# Patient Record
Sex: FEMALE | Race: WHITE | NOT HISPANIC OR LATINO | Employment: UNEMPLOYED | URBAN - NONMETROPOLITAN AREA
[De-identification: names, ages, dates, MRNs, and addresses within clinical notes are randomized per-mention and may not be internally consistent; named-entity substitution may affect disease eponyms.]

---

## 2017-02-01 ENCOUNTER — TELEPHONE (OUTPATIENT)
Dept: CARDIOLOGY | Facility: CLINIC | Age: 72
End: 2017-02-01

## 2017-02-01 DIAGNOSIS — R53.83 FATIGUE, UNSPECIFIED TYPE: ICD-10-CM

## 2017-02-01 DIAGNOSIS — Z79.899 LONG TERM USE OF DRUG: ICD-10-CM

## 2017-02-01 DIAGNOSIS — R05.9 COUGH: Primary | ICD-10-CM

## 2017-02-02 ENCOUNTER — TELEPHONE (OUTPATIENT)
Dept: CARDIOLOGY | Facility: CLINIC | Age: 72
End: 2017-02-02

## 2017-02-02 ENCOUNTER — LAB (OUTPATIENT)
Dept: LAB | Facility: HOSPITAL | Age: 72
End: 2017-02-02
Attending: INTERNAL MEDICINE

## 2017-02-02 ENCOUNTER — HOSPITAL ENCOUNTER (OUTPATIENT)
Dept: GENERAL RADIOLOGY | Facility: HOSPITAL | Age: 72
Discharge: HOME | End: 2017-02-02
Attending: INTERNAL MEDICINE | Admitting: INTERNAL MEDICINE

## 2017-02-02 DIAGNOSIS — R05.9 COUGH: ICD-10-CM

## 2017-02-02 DIAGNOSIS — R53.83 FATIGUE, UNSPECIFIED TYPE: ICD-10-CM

## 2017-02-02 LAB
ALBUMIN SERPL-MCNC: 4.5 G/DL (ref 3.4–4.8)
ALBUMIN/GLOB SERPL: 1.6 G/DL (ref 1.5–2.5)
ALP SERPL-CCNC: 103 U/L (ref 46–116)
ALT SERPL W P-5'-P-CCNC: 20 U/L (ref 10–36)
ANION GAP SERPL CALCULATED.3IONS-SCNC: 7.2 MMOL/L (ref 3.6–11.2)
AST SERPL-CCNC: 28 U/L (ref 10–30)
BASOPHILS # BLD AUTO: 0.02 10*3/MM3 (ref 0–0.3)
BASOPHILS NFR BLD AUTO: 0.3 % (ref 0–2)
BILIRUB SERPL-MCNC: 1.1 MG/DL (ref 0.2–1.8)
BUN BLD-MCNC: 12 MG/DL (ref 7–21)
BUN/CREAT SERPL: 12.1 (ref 7–25)
CALCIUM SPEC-SCNC: 9.6 MG/DL (ref 7.7–10)
CHLORIDE SERPL-SCNC: 104 MMOL/L (ref 99–112)
CO2 SERPL-SCNC: 29.8 MMOL/L (ref 24.3–31.9)
CREAT BLD-MCNC: 0.99 MG/DL (ref 0.43–1.29)
DEPRECATED RDW RBC AUTO: 44.8 FL (ref 37–54)
EOSINOPHIL # BLD AUTO: 0.2 10*3/MM3 (ref 0–0.7)
EOSINOPHIL NFR BLD AUTO: 2.8 % (ref 0–7)
ERYTHROCYTE [DISTWIDTH] IN BLOOD BY AUTOMATED COUNT: 13.6 % (ref 11.5–14.5)
GFR SERPL CREATININE-BSD FRML MDRD: 55 ML/MIN/1.73
GLOBULIN UR ELPH-MCNC: 2.8 GM/DL
GLUCOSE BLD-MCNC: 124 MG/DL (ref 70–110)
HCT VFR BLD AUTO: 39.2 % (ref 37–47)
HGB BLD-MCNC: 13.1 G/DL (ref 12–16)
IMM GRANULOCYTES # BLD: 0.02 10*3/MM3 (ref 0–0.03)
IMM GRANULOCYTES NFR BLD: 0.3 % (ref 0–0.5)
LYMPHOCYTES # BLD AUTO: 1.72 10*3/MM3 (ref 1–3)
LYMPHOCYTES NFR BLD AUTO: 23.9 % (ref 16–46)
MCH RBC QN AUTO: 30.5 PG (ref 27–33)
MCHC RBC AUTO-ENTMCNC: 33.4 G/DL (ref 33–37)
MCV RBC AUTO: 91.2 FL (ref 80–94)
MONOCYTES # BLD AUTO: 0.43 10*3/MM3 (ref 0.1–0.9)
MONOCYTES NFR BLD AUTO: 6 % (ref 0–12)
NEUTROPHILS # BLD AUTO: 4.82 10*3/MM3 (ref 1.4–6.5)
NEUTROPHILS NFR BLD AUTO: 66.7 % (ref 40–75)
OSMOLALITY SERPL CALC.SUM OF ELEC: 282.4 MOSM/KG (ref 273–305)
PLATELET # BLD AUTO: 268 10*3/MM3 (ref 130–400)
PMV BLD AUTO: 9.5 FL (ref 6–10)
POTASSIUM BLD-SCNC: 4.2 MMOL/L (ref 3.5–5.3)
PROT SERPL-MCNC: 7.3 G/DL (ref 6–8)
RBC # BLD AUTO: 4.3 10*6/MM3 (ref 4.2–5.4)
SODIUM BLD-SCNC: 141 MMOL/L (ref 135–153)
WBC NRBC COR # BLD: 7.21 10*3/MM3 (ref 4.5–12.5)

## 2017-02-02 PROCEDURE — 85025 COMPLETE CBC W/AUTO DIFF WBC: CPT | Performed by: INTERNAL MEDICINE

## 2017-02-02 PROCEDURE — 80053 COMPREHEN METABOLIC PANEL: CPT | Performed by: INTERNAL MEDICINE

## 2017-02-02 PROCEDURE — 71020 HC CHEST PA AND LATERAL: CPT

## 2017-02-02 PROCEDURE — 36415 COLL VENOUS BLD VENIPUNCTURE: CPT

## 2017-02-02 PROCEDURE — 71020 XR CHEST 2 VW: CPT | Performed by: RADIOLOGY

## 2017-08-31 ENCOUNTER — TRANSCRIBE ORDERS (OUTPATIENT)
Dept: ADMINISTRATIVE | Facility: HOSPITAL | Age: 72
End: 2017-08-31

## 2017-08-31 ENCOUNTER — LAB (OUTPATIENT)
Dept: LAB | Facility: HOSPITAL | Age: 72
End: 2017-08-31

## 2017-08-31 DIAGNOSIS — E78.5 HYPERLIPIDEMIA, UNSPECIFIED HYPERLIPIDEMIA TYPE: Primary | ICD-10-CM

## 2017-08-31 DIAGNOSIS — E03.9 UNSPECIFIED HYPOTHYROIDISM: ICD-10-CM

## 2017-08-31 DIAGNOSIS — E11.9 DIABETES MELLITUS WITHOUT COMPLICATION (HCC): ICD-10-CM

## 2017-08-31 DIAGNOSIS — R53.82 CHRONIC FATIGUE: ICD-10-CM

## 2017-08-31 DIAGNOSIS — E78.5 HYPERLIPIDEMIA, UNSPECIFIED HYPERLIPIDEMIA TYPE: ICD-10-CM

## 2017-08-31 DIAGNOSIS — E55.9 VITAMIN D DEFICIENCY: ICD-10-CM

## 2017-08-31 LAB
25(OH)D3 SERPL-MCNC: 36 NG/ML
ALBUMIN SERPL-MCNC: 4.8 G/DL (ref 3.4–4.8)
ALBUMIN/GLOB SERPL: 1.7 G/DL (ref 1.5–2.5)
ALP SERPL-CCNC: 100 U/L (ref 35–104)
ALT SERPL W P-5'-P-CCNC: 19 U/L (ref 10–36)
ANION GAP SERPL CALCULATED.3IONS-SCNC: 6.9 MMOL/L (ref 3.6–11.2)
AST SERPL-CCNC: 26 U/L (ref 10–30)
BASOPHILS # BLD AUTO: 0.02 10*3/MM3 (ref 0–0.3)
BASOPHILS NFR BLD AUTO: 0.3 % (ref 0–2)
BILIRUB SERPL-MCNC: 1 MG/DL (ref 0.2–1.8)
BUN BLD-MCNC: 17 MG/DL (ref 7–21)
BUN/CREAT SERPL: 15.9 (ref 7–25)
CALCIUM SPEC-SCNC: 9.8 MG/DL (ref 7.7–10)
CHLORIDE SERPL-SCNC: 99 MMOL/L (ref 99–112)
CHOLEST SERPL-MCNC: 192 MG/DL (ref 0–200)
CO2 SERPL-SCNC: 31.1 MMOL/L (ref 24.3–31.9)
CREAT BLD-MCNC: 1.07 MG/DL (ref 0.43–1.29)
DEPRECATED RDW RBC AUTO: 42 FL (ref 37–54)
EOSINOPHIL # BLD AUTO: 0.21 10*3/MM3 (ref 0–0.7)
EOSINOPHIL NFR BLD AUTO: 2.8 % (ref 0–7)
ERYTHROCYTE [DISTWIDTH] IN BLOOD BY AUTOMATED COUNT: 12.8 % (ref 11.5–14.5)
GFR SERPL CREATININE-BSD FRML MDRD: 51 ML/MIN/1.73
GLOBULIN UR ELPH-MCNC: 2.8 GM/DL
GLUCOSE BLD-MCNC: 114 MG/DL (ref 70–110)
HBA1C MFR BLD: 5.5 % (ref 4.5–5.7)
HCT VFR BLD AUTO: 39.6 % (ref 37–47)
HDLC SERPL-MCNC: 74 MG/DL (ref 60–100)
HGB BLD-MCNC: 13.1 G/DL (ref 12–16)
IMM GRANULOCYTES # BLD: 0.01 10*3/MM3 (ref 0–0.03)
IMM GRANULOCYTES NFR BLD: 0.1 % (ref 0–0.5)
LDLC SERPL CALC-MCNC: 99 MG/DL (ref 0–100)
LDLC/HDLC SERPL: 1.33 {RATIO}
LYMPHOCYTES # BLD AUTO: 1.97 10*3/MM3 (ref 1–3)
LYMPHOCYTES NFR BLD AUTO: 26.5 % (ref 16–46)
MCH RBC QN AUTO: 30.2 PG (ref 27–33)
MCHC RBC AUTO-ENTMCNC: 33.1 G/DL (ref 33–37)
MCV RBC AUTO: 91.2 FL (ref 80–94)
MONOCYTES # BLD AUTO: 0.42 10*3/MM3 (ref 0.1–0.9)
MONOCYTES NFR BLD AUTO: 5.6 % (ref 0–12)
NEUTROPHILS # BLD AUTO: 4.81 10*3/MM3 (ref 1.4–6.5)
NEUTROPHILS NFR BLD AUTO: 64.7 % (ref 40–75)
OSMOLALITY SERPL CALC.SUM OF ELEC: 276.2 MOSM/KG (ref 273–305)
PLATELET # BLD AUTO: 308 10*3/MM3 (ref 130–400)
PMV BLD AUTO: 9.2 FL (ref 6–10)
POTASSIUM BLD-SCNC: 4.2 MMOL/L (ref 3.5–5.3)
PROT SERPL-MCNC: 7.6 G/DL (ref 6–8)
RBC # BLD AUTO: 4.34 10*6/MM3 (ref 4.2–5.4)
SODIUM BLD-SCNC: 137 MMOL/L (ref 135–153)
TRIGL SERPL-MCNC: 97 MG/DL (ref 0–150)
TSH SERPL DL<=0.05 MIU/L-ACNC: 2.56 MIU/ML (ref 0.55–4.78)
VIT B12 BLD-MCNC: 564 PG/ML (ref 211–911)
VLDLC SERPL-MCNC: 19.4 MG/DL
WBC NRBC COR # BLD: 7.44 10*3/MM3 (ref 4.5–12.5)

## 2017-08-31 PROCEDURE — 83036 HEMOGLOBIN GLYCOSYLATED A1C: CPT | Performed by: FAMILY MEDICINE

## 2017-08-31 PROCEDURE — 84443 ASSAY THYROID STIM HORMONE: CPT | Performed by: FAMILY MEDICINE

## 2017-08-31 PROCEDURE — 80053 COMPREHEN METABOLIC PANEL: CPT | Performed by: FAMILY MEDICINE

## 2017-08-31 PROCEDURE — 82607 VITAMIN B-12: CPT | Performed by: FAMILY MEDICINE

## 2017-08-31 PROCEDURE — 85025 COMPLETE CBC W/AUTO DIFF WBC: CPT | Performed by: FAMILY MEDICINE

## 2017-08-31 PROCEDURE — 82306 VITAMIN D 25 HYDROXY: CPT | Performed by: FAMILY MEDICINE

## 2017-08-31 PROCEDURE — 36415 COLL VENOUS BLD VENIPUNCTURE: CPT

## 2017-08-31 PROCEDURE — 80061 LIPID PANEL: CPT | Performed by: FAMILY MEDICINE

## 2017-10-26 ENCOUNTER — TRANSCRIBE ORDERS (OUTPATIENT)
Dept: ADMINISTRATIVE | Facility: HOSPITAL | Age: 72
End: 2017-10-26

## 2017-10-26 DIAGNOSIS — M25.562 LEFT KNEE PAIN, UNSPECIFIED CHRONICITY: ICD-10-CM

## 2017-10-26 DIAGNOSIS — R07.89 OTHER CHEST PAIN: ICD-10-CM

## 2017-10-26 DIAGNOSIS — R13.10 DYSPHAGIA, UNSPECIFIED TYPE: Primary | ICD-10-CM

## 2017-10-27 ENCOUNTER — HOSPITAL ENCOUNTER (OUTPATIENT)
Dept: GENERAL RADIOLOGY | Facility: HOSPITAL | Age: 72
Discharge: HOME | End: 2017-10-27

## 2017-10-27 ENCOUNTER — HOSPITAL ENCOUNTER (OUTPATIENT)
Dept: GENERAL RADIOLOGY | Facility: HOSPITAL | Age: 72
Discharge: HOME | End: 2017-10-27
Admitting: PHYSICIAN ASSISTANT

## 2017-10-27 ENCOUNTER — HOSPITAL ENCOUNTER (OUTPATIENT)
Dept: GENERAL RADIOLOGY | Facility: HOSPITAL | Age: 72
Discharge: HOME | End: 2017-10-27
Admitting: FAMILY MEDICINE

## 2017-10-27 DIAGNOSIS — M25.562 LEFT KNEE PAIN, UNSPECIFIED CHRONICITY: ICD-10-CM

## 2017-10-27 DIAGNOSIS — R13.10 DYSPHAGIA, UNSPECIFIED TYPE: ICD-10-CM

## 2017-10-27 DIAGNOSIS — R07.89 OTHER CHEST PAIN: ICD-10-CM

## 2017-10-27 PROCEDURE — 73564 X-RAY EXAM KNEE 4 OR MORE: CPT | Performed by: RADIOLOGY

## 2017-10-27 PROCEDURE — 74241 FL UPPER GI SINGLE CONTRAST W KUB: CPT | Performed by: RADIOLOGY

## 2017-10-27 PROCEDURE — 73564 X-RAY EXAM KNEE 4 OR MORE: CPT

## 2017-10-27 PROCEDURE — 71020 HC CHEST PA AND LATERAL: CPT

## 2017-10-27 PROCEDURE — 71020 XR CHEST PA AND LATERAL: CPT | Performed by: RADIOLOGY

## 2017-10-27 RX ADMIN — BARIUM SULFATE 140 ML: 980 POWDER, FOR SUSPENSION ORAL at 09:55

## 2017-11-15 ENCOUNTER — OFFICE VISIT (OUTPATIENT)
Dept: SURGERY | Facility: CLINIC | Age: 72
End: 2017-11-15

## 2017-11-15 ENCOUNTER — CONSULT (OUTPATIENT)
Dept: CARDIOLOGY | Facility: CLINIC | Age: 72
End: 2017-11-15

## 2017-11-15 VITALS
HEART RATE: 73 BPM | WEIGHT: 166.8 LBS | BODY MASS INDEX: 29.55 KG/M2 | SYSTOLIC BLOOD PRESSURE: 118 MMHG | OXYGEN SATURATION: 97 % | DIASTOLIC BLOOD PRESSURE: 68 MMHG | HEIGHT: 63 IN

## 2017-11-15 VITALS — BODY MASS INDEX: 29.41 KG/M2 | HEIGHT: 63 IN | WEIGHT: 166 LBS

## 2017-11-15 DIAGNOSIS — K44.9 PARAESOPHAGEAL HERNIA: Primary | ICD-10-CM

## 2017-11-15 DIAGNOSIS — I48.0 PAF (PAROXYSMAL ATRIAL FIBRILLATION) (HCC): Primary | ICD-10-CM

## 2017-11-15 DIAGNOSIS — M25.473 ANKLE EDEMA: ICD-10-CM

## 2017-11-15 PROCEDURE — 99213 OFFICE O/P EST LOW 20 MIN: CPT | Performed by: INTERNAL MEDICINE

## 2017-11-15 PROCEDURE — 99204 OFFICE O/P NEW MOD 45 MIN: CPT | Performed by: SURGERY

## 2017-11-15 RX ORDER — PANTOPRAZOLE SODIUM 40 MG/1
40 TABLET, DELAYED RELEASE ORAL DAILY
COMMUNITY
End: 2017-11-28 | Stop reason: SDUPTHER

## 2017-11-15 RX ORDER — FLUCONAZOLE 100 MG/1
100 TABLET ORAL DAILY
Status: ON HOLD | COMMUNITY
End: 2017-12-07

## 2017-11-15 RX ORDER — RANITIDINE 150 MG/1
150 TABLET ORAL 2 TIMES DAILY
COMMUNITY
End: 2017-12-06

## 2017-11-27 ENCOUNTER — HOSPITAL ENCOUNTER (OUTPATIENT)
Dept: CT IMAGING | Facility: HOSPITAL | Age: 72
Discharge: HOME | End: 2017-11-27
Attending: SURGERY | Admitting: SURGERY

## 2017-11-27 ENCOUNTER — HOSPITAL ENCOUNTER (OUTPATIENT)
Dept: CT IMAGING | Facility: HOSPITAL | Age: 72
Discharge: HOME | End: 2017-11-27
Attending: SURGERY

## 2017-11-27 DIAGNOSIS — K44.9 PARAESOPHAGEAL HERNIA: ICD-10-CM

## 2017-11-27 PROCEDURE — 74177 CT ABD & PELVIS W/CONTRAST: CPT | Performed by: RADIOLOGY

## 2017-11-27 PROCEDURE — 0 IOPAMIDOL 61 % SOLUTION: Performed by: SURGERY

## 2017-11-27 PROCEDURE — 82565 ASSAY OF CREATININE: CPT

## 2017-11-27 PROCEDURE — 71260 CT THORAX DX C+: CPT | Performed by: RADIOLOGY

## 2017-11-27 PROCEDURE — 74177 CT ABD & PELVIS W/CONTRAST: CPT

## 2017-11-27 PROCEDURE — 71260 CT THORAX DX C+: CPT

## 2017-11-27 RX ADMIN — IOPAMIDOL 100 ML: 612 INJECTION, SOLUTION INTRAVENOUS at 11:30

## 2017-11-27 RX ADMIN — IOPAMIDOL 100 ML: 612 INJECTION, SOLUTION INTRAVENOUS at 11:45

## 2017-11-28 RX ORDER — PANTOPRAZOLE SODIUM 40 MG/1
40 TABLET, DELAYED RELEASE ORAL DAILY
Qty: 30 TABLET | Refills: 0 | Status: SHIPPED | OUTPATIENT
Start: 2017-11-28 | End: 2018-07-18 | Stop reason: ALTCHOICE

## 2017-12-06 LAB — CREAT BLDA-MCNC: 1.2 MG/DL (ref 0.6–1.3)

## 2017-12-06 RX ORDER — PROMETHAZINE HYDROCHLORIDE 12.5 MG/1
12.5 SUPPOSITORY RECTAL EVERY 6 HOURS PRN
Status: ON HOLD | COMMUNITY
End: 2018-02-21

## 2017-12-07 ENCOUNTER — HOSPITAL ENCOUNTER (OUTPATIENT)
Facility: HOSPITAL | Age: 72
Setting detail: HOSPITAL OUTPATIENT SURGERY
Discharge: HOME | End: 2017-12-07
Attending: SURGERY | Admitting: SURGERY

## 2017-12-07 ENCOUNTER — ANESTHESIA (OUTPATIENT)
Dept: PERIOP | Facility: HOSPITAL | Age: 72
End: 2017-12-07

## 2017-12-07 ENCOUNTER — ANESTHESIA EVENT (OUTPATIENT)
Dept: PERIOP | Facility: HOSPITAL | Age: 72
End: 2017-12-07

## 2017-12-07 VITALS
BODY MASS INDEX: 30.36 KG/M2 | HEIGHT: 62 IN | RESPIRATION RATE: 18 BRPM | DIASTOLIC BLOOD PRESSURE: 78 MMHG | WEIGHT: 165 LBS | OXYGEN SATURATION: 97 % | HEART RATE: 83 BPM | TEMPERATURE: 98.3 F | SYSTOLIC BLOOD PRESSURE: 127 MMHG

## 2017-12-07 DIAGNOSIS — K44.9 PARAESOPHAGEAL HERNIA: ICD-10-CM

## 2017-12-07 PROCEDURE — 43239 EGD BIOPSY SINGLE/MULTIPLE: CPT | Performed by: SURGERY

## 2017-12-07 PROCEDURE — 88305 TISSUE EXAM BY PATHOLOGIST: CPT | Performed by: SURGERY

## 2017-12-07 PROCEDURE — 25010000002 PROPOFOL 10 MG/ML EMULSION: Performed by: NURSE ANESTHETIST, CERTIFIED REGISTERED

## 2017-12-07 PROCEDURE — 25010000002 PROPOFOL 1000 MG/ML EMULSION: Performed by: NURSE ANESTHETIST, CERTIFIED REGISTERED

## 2017-12-07 RX ORDER — FENTANYL CITRATE 50 UG/ML
50 INJECTION, SOLUTION INTRAMUSCULAR; INTRAVENOUS
Status: DISCONTINUED | OUTPATIENT
Start: 2017-12-07 | End: 2017-12-07 | Stop reason: HOSPADM

## 2017-12-07 RX ORDER — PROPOFOL 10 MG/ML
VIAL (ML) INTRAVENOUS AS NEEDED
Status: DISCONTINUED | OUTPATIENT
Start: 2017-12-07 | End: 2017-12-07 | Stop reason: SURG

## 2017-12-07 RX ORDER — SODIUM CHLORIDE, SODIUM LACTATE, POTASSIUM CHLORIDE, CALCIUM CHLORIDE 600; 310; 30; 20 MG/100ML; MG/100ML; MG/100ML; MG/100ML
125 INJECTION, SOLUTION INTRAVENOUS CONTINUOUS
Status: DISCONTINUED | OUTPATIENT
Start: 2017-12-07 | End: 2017-12-07 | Stop reason: HOSPADM

## 2017-12-07 RX ORDER — SODIUM CHLORIDE 0.9 % (FLUSH) 0.9 %
1-10 SYRINGE (ML) INJECTION AS NEEDED
Status: DISCONTINUED | OUTPATIENT
Start: 2017-12-07 | End: 2017-12-07 | Stop reason: HOSPADM

## 2017-12-07 RX ORDER — MIDAZOLAM HYDROCHLORIDE 1 MG/ML
1 INJECTION INTRAMUSCULAR; INTRAVENOUS
Status: DISCONTINUED | OUTPATIENT
Start: 2017-12-07 | End: 2017-12-07 | Stop reason: HOSPADM

## 2017-12-07 RX ORDER — IPRATROPIUM BROMIDE AND ALBUTEROL SULFATE 2.5; .5 MG/3ML; MG/3ML
3 SOLUTION RESPIRATORY (INHALATION) ONCE AS NEEDED
Status: DISCONTINUED | OUTPATIENT
Start: 2017-12-07 | End: 2017-12-07 | Stop reason: HOSPADM

## 2017-12-07 RX ORDER — ONDANSETRON 2 MG/ML
4 INJECTION INTRAMUSCULAR; INTRAVENOUS ONCE AS NEEDED
Status: DISCONTINUED | OUTPATIENT
Start: 2017-12-07 | End: 2017-12-07 | Stop reason: HOSPADM

## 2017-12-07 RX ORDER — MIDAZOLAM HYDROCHLORIDE 1 MG/ML
2 INJECTION INTRAMUSCULAR; INTRAVENOUS
Status: DISCONTINUED | OUTPATIENT
Start: 2017-12-07 | End: 2017-12-07 | Stop reason: HOSPADM

## 2017-12-07 RX ORDER — MEPERIDINE HYDROCHLORIDE 25 MG/ML
12.5 INJECTION INTRAMUSCULAR; INTRAVENOUS; SUBCUTANEOUS
Status: DISCONTINUED | OUTPATIENT
Start: 2017-12-07 | End: 2017-12-07 | Stop reason: HOSPADM

## 2017-12-07 RX ADMIN — PROPOFOL 30 MG: 10 INJECTION, EMULSION INTRAVENOUS at 07:41

## 2017-12-07 RX ADMIN — PROPOFOL 140 MCG/KG/MIN: 10 INJECTION, EMULSION INTRAVENOUS at 07:41

## 2017-12-11 LAB
LAB AP CASE REPORT: NORMAL
Lab: NORMAL
PATH REPORT.FINAL DX SPEC: NORMAL

## 2017-12-27 ENCOUNTER — OFFICE VISIT (OUTPATIENT)
Dept: SURGERY | Facility: CLINIC | Age: 72
End: 2017-12-27

## 2017-12-27 VITALS — WEIGHT: 164 LBS | HEIGHT: 62 IN | BODY MASS INDEX: 30.18 KG/M2

## 2017-12-27 DIAGNOSIS — K44.9 PARAESOPHAGEAL HERNIA: Primary | ICD-10-CM

## 2017-12-27 PROCEDURE — 99213 OFFICE O/P EST LOW 20 MIN: CPT | Performed by: SURGERY

## 2018-01-02 ENCOUNTER — HOSPITAL ENCOUNTER (OUTPATIENT)
Dept: PREOP | Facility: HOSPITAL | Age: 73
Setting detail: HOSPITAL OUTPATIENT SURGERY
Discharge: HOME | End: 2018-01-02
Attending: SURGERY | Admitting: SURGERY

## 2018-01-02 VITALS
OXYGEN SATURATION: 98 % | DIASTOLIC BLOOD PRESSURE: 77 MMHG | BODY MASS INDEX: 30.18 KG/M2 | WEIGHT: 164 LBS | TEMPERATURE: 98.2 F | RESPIRATION RATE: 20 BRPM | SYSTOLIC BLOOD PRESSURE: 133 MMHG | HEART RATE: 69 BPM | HEIGHT: 62 IN

## 2018-01-02 DIAGNOSIS — K44.9 PARAESOPHAGEAL HERNIA: ICD-10-CM

## 2018-01-02 PROCEDURE — 91010 ESOPHAGUS MOTILITY STUDY: CPT

## 2018-01-02 RX ADMIN — LIDOCAINE HYDROCHLORIDE 3 ML: 20 SOLUTION ORAL; TOPICAL at 13:29

## 2018-01-10 ENCOUNTER — OFFICE VISIT (OUTPATIENT)
Dept: SURGERY | Facility: CLINIC | Age: 73
End: 2018-01-10

## 2018-01-10 VITALS — HEIGHT: 62 IN | WEIGHT: 164 LBS | BODY MASS INDEX: 30.18 KG/M2

## 2018-01-10 DIAGNOSIS — K44.9 PARAESOPHAGEAL HERNIA: Primary | ICD-10-CM

## 2018-01-10 PROCEDURE — 99213 OFFICE O/P EST LOW 20 MIN: CPT | Performed by: SURGERY

## 2018-01-10 RX ORDER — DOCUSATE SODIUM 250 MG
250 CAPSULE ORAL 2 TIMES DAILY PRN
Qty: 60 CAPSULE | Refills: 2 | Status: SHIPPED | OUTPATIENT
Start: 2018-01-10 | End: 2018-07-18 | Stop reason: ALTCHOICE

## 2018-02-12 ENCOUNTER — CONSULT (OUTPATIENT)
Dept: GASTROENTEROLOGY | Facility: CLINIC | Age: 73
End: 2018-02-12

## 2018-02-12 VITALS
SYSTOLIC BLOOD PRESSURE: 138 MMHG | HEIGHT: 62 IN | BODY MASS INDEX: 29.59 KG/M2 | DIASTOLIC BLOOD PRESSURE: 78 MMHG | WEIGHT: 160.8 LBS | OXYGEN SATURATION: 99 % | HEART RATE: 81 BPM

## 2018-02-12 DIAGNOSIS — K44.9 PARAESOPHAGEAL HERNIA: ICD-10-CM

## 2018-02-12 DIAGNOSIS — R13.10 DYSPHAGIA, UNSPECIFIED TYPE: Primary | ICD-10-CM

## 2018-02-12 DIAGNOSIS — K21.9 GASTROESOPHAGEAL REFLUX DISEASE, ESOPHAGITIS PRESENCE NOT SPECIFIED: ICD-10-CM

## 2018-02-12 PROCEDURE — 99214 OFFICE O/P EST MOD 30 MIN: CPT | Performed by: PHYSICIAN ASSISTANT

## 2018-02-13 PROBLEM — R13.10 DYSPHAGIA: Status: ACTIVE | Noted: 2018-02-13

## 2018-02-13 PROBLEM — K21.9 GASTROESOPHAGEAL REFLUX DISEASE: Status: ACTIVE | Noted: 2018-02-13

## 2018-02-20 ENCOUNTER — OFFICE VISIT (OUTPATIENT)
Dept: SURGERY | Facility: CLINIC | Age: 73
End: 2018-02-20

## 2018-02-20 VITALS — BODY MASS INDEX: 29.44 KG/M2 | HEIGHT: 62 IN | WEIGHT: 160 LBS

## 2018-02-20 DIAGNOSIS — K44.9 PARAESOPHAGEAL HERNIA: ICD-10-CM

## 2018-02-20 DIAGNOSIS — K21.9 GASTROESOPHAGEAL REFLUX DISEASE WITHOUT ESOPHAGITIS: Primary | ICD-10-CM

## 2018-02-20 PROCEDURE — 99213 OFFICE O/P EST LOW 20 MIN: CPT | Performed by: SURGERY

## 2018-02-20 RX ORDER — RANITIDINE 150 MG/1
150 TABLET ORAL 2 TIMES DAILY
COMMUNITY
End: 2018-07-18 | Stop reason: ALTCHOICE

## 2018-02-21 ENCOUNTER — ANESTHESIA EVENT (OUTPATIENT)
Dept: PERIOP | Facility: HOSPITAL | Age: 73
End: 2018-02-21

## 2018-02-21 ENCOUNTER — HOSPITAL ENCOUNTER (OUTPATIENT)
Facility: HOSPITAL | Age: 73
Setting detail: HOSPITAL OUTPATIENT SURGERY
Discharge: HOME | End: 2018-02-21
Attending: INTERNAL MEDICINE | Admitting: INTERNAL MEDICINE

## 2018-02-21 ENCOUNTER — ANESTHESIA (OUTPATIENT)
Dept: PERIOP | Facility: HOSPITAL | Age: 73
End: 2018-02-21

## 2018-02-21 VITALS
WEIGHT: 160 LBS | DIASTOLIC BLOOD PRESSURE: 53 MMHG | HEART RATE: 66 BPM | HEIGHT: 62 IN | BODY MASS INDEX: 29.44 KG/M2 | SYSTOLIC BLOOD PRESSURE: 106 MMHG | TEMPERATURE: 97.6 F | RESPIRATION RATE: 20 BRPM | OXYGEN SATURATION: 100 %

## 2018-02-21 DIAGNOSIS — R13.10 DYSPHAGIA, UNSPECIFIED TYPE: ICD-10-CM

## 2018-02-21 DIAGNOSIS — K44.9 PARAESOPHAGEAL HERNIA: ICD-10-CM

## 2018-02-21 DIAGNOSIS — K21.9 GASTROESOPHAGEAL REFLUX DISEASE, ESOPHAGITIS PRESENCE NOT SPECIFIED: ICD-10-CM

## 2018-02-21 PROCEDURE — 43239 EGD BIOPSY SINGLE/MULTIPLE: CPT | Performed by: INTERNAL MEDICINE

## 2018-02-21 PROCEDURE — 88305 TISSUE EXAM BY PATHOLOGIST: CPT | Performed by: INTERNAL MEDICINE

## 2018-02-21 PROCEDURE — 88342 IMHCHEM/IMCYTCHM 1ST ANTB: CPT | Performed by: INTERNAL MEDICINE

## 2018-02-21 PROCEDURE — 43248 EGD GUIDE WIRE INSERTION: CPT | Performed by: INTERNAL MEDICINE

## 2018-02-21 PROCEDURE — 25010000002 PROPOFOL 10 MG/ML EMULSION: Performed by: NURSE ANESTHETIST, CERTIFIED REGISTERED

## 2018-02-21 RX ORDER — SODIUM CHLORIDE, SODIUM LACTATE, POTASSIUM CHLORIDE, CALCIUM CHLORIDE 600; 310; 30; 20 MG/100ML; MG/100ML; MG/100ML; MG/100ML
125 INJECTION, SOLUTION INTRAVENOUS CONTINUOUS
Status: DISCONTINUED | OUTPATIENT
Start: 2018-02-21 | End: 2018-02-21 | Stop reason: HOSPADM

## 2018-02-21 RX ORDER — VITAMIN E 268 MG
400 CAPSULE ORAL DAILY
COMMUNITY

## 2018-02-21 RX ORDER — LIDOCAINE HYDROCHLORIDE 20 MG/ML
INJECTION, SOLUTION INFILTRATION; PERINEURAL AS NEEDED
Status: DISCONTINUED | OUTPATIENT
Start: 2018-02-21 | End: 2018-02-21 | Stop reason: SURG

## 2018-02-21 RX ORDER — FENTANYL CITRATE 50 UG/ML
50 INJECTION, SOLUTION INTRAMUSCULAR; INTRAVENOUS
Status: DISCONTINUED | OUTPATIENT
Start: 2018-02-21 | End: 2018-02-21 | Stop reason: HOSPADM

## 2018-02-21 RX ORDER — ONDANSETRON 2 MG/ML
4 INJECTION INTRAMUSCULAR; INTRAVENOUS ONCE AS NEEDED
Status: DISCONTINUED | OUTPATIENT
Start: 2018-02-21 | End: 2018-02-21 | Stop reason: HOSPADM

## 2018-02-21 RX ORDER — IPRATROPIUM BROMIDE AND ALBUTEROL SULFATE 2.5; .5 MG/3ML; MG/3ML
3 SOLUTION RESPIRATORY (INHALATION) ONCE AS NEEDED
Status: DISCONTINUED | OUTPATIENT
Start: 2018-02-21 | End: 2018-02-21 | Stop reason: HOSPADM

## 2018-02-21 RX ORDER — SODIUM CHLORIDE 0.9 % (FLUSH) 0.9 %
1-10 SYRINGE (ML) INJECTION AS NEEDED
Status: DISCONTINUED | OUTPATIENT
Start: 2018-02-21 | End: 2018-02-21 | Stop reason: HOSPADM

## 2018-02-21 RX ORDER — PROPOFOL 10 MG/ML
VIAL (ML) INTRAVENOUS AS NEEDED
Status: DISCONTINUED | OUTPATIENT
Start: 2018-02-21 | End: 2018-02-21 | Stop reason: SURG

## 2018-02-21 RX ADMIN — LIDOCAINE HYDROCHLORIDE 80 MG: 20 INJECTION, SOLUTION INFILTRATION; PERINEURAL at 10:43

## 2018-02-21 RX ADMIN — PROPOFOL 100 MG: 10 INJECTION, EMULSION INTRAVENOUS at 10:43

## 2018-02-21 RX ADMIN — SODIUM CHLORIDE, POTASSIUM CHLORIDE, SODIUM LACTATE AND CALCIUM CHLORIDE 125 ML/HR: 600; 310; 30; 20 INJECTION, SOLUTION INTRAVENOUS at 09:44

## 2018-02-23 LAB
LAB AP CASE REPORT: NORMAL
Lab: NORMAL
PATH REPORT.FINAL DX SPEC: NORMAL

## 2018-03-01 ENCOUNTER — OFFICE VISIT (OUTPATIENT)
Dept: CARDIOLOGY | Facility: CLINIC | Age: 73
End: 2018-03-01

## 2018-03-01 VITALS
WEIGHT: 158.4 LBS | BODY MASS INDEX: 29.15 KG/M2 | HEART RATE: 70 BPM | SYSTOLIC BLOOD PRESSURE: 124 MMHG | OXYGEN SATURATION: 97 % | HEIGHT: 62 IN | DIASTOLIC BLOOD PRESSURE: 78 MMHG

## 2018-03-01 DIAGNOSIS — R94.31 ABNORMAL EKG: ICD-10-CM

## 2018-03-01 DIAGNOSIS — I48.0 PAF (PAROXYSMAL ATRIAL FIBRILLATION) (HCC): Primary | ICD-10-CM

## 2018-03-01 PROBLEM — R07.9 CHEST PAIN IN ADULT: Status: ACTIVE | Noted: 2018-03-01

## 2018-03-01 PROCEDURE — 99213 OFFICE O/P EST LOW 20 MIN: CPT | Performed by: INTERNAL MEDICINE

## 2018-03-01 PROCEDURE — 93000 ELECTROCARDIOGRAM COMPLETE: CPT | Performed by: INTERNAL MEDICINE

## 2018-03-01 RX ORDER — MELATONIN
5000 DAILY
COMMUNITY

## 2018-03-01 RX ORDER — FUROSEMIDE 40 MG/1
TABLET ORAL
COMMUNITY
Start: 2018-01-22

## 2018-07-18 ENCOUNTER — OFFICE VISIT (OUTPATIENT)
Dept: CARDIOLOGY | Facility: CLINIC | Age: 73
End: 2018-07-18

## 2018-07-18 VITALS
HEIGHT: 62 IN | HEART RATE: 83 BPM | SYSTOLIC BLOOD PRESSURE: 128 MMHG | BODY MASS INDEX: 28.34 KG/M2 | DIASTOLIC BLOOD PRESSURE: 70 MMHG | OXYGEN SATURATION: 98 % | WEIGHT: 154 LBS

## 2018-07-18 DIAGNOSIS — I48.0 PAF (PAROXYSMAL ATRIAL FIBRILLATION) (HCC): ICD-10-CM

## 2018-07-18 DIAGNOSIS — R94.31 ABNORMAL EKG: ICD-10-CM

## 2018-07-18 DIAGNOSIS — I48.91 ATRIAL FIBRILLATION WITH RVR (HCC): Primary | ICD-10-CM

## 2018-07-18 PROCEDURE — 93000 ELECTROCARDIOGRAM COMPLETE: CPT | Performed by: INTERNAL MEDICINE

## 2018-07-18 PROCEDURE — 99214 OFFICE O/P EST MOD 30 MIN: CPT | Performed by: INTERNAL MEDICINE

## 2018-07-18 RX ORDER — METOPROLOL SUCCINATE 25 MG/1
25 TABLET, EXTENDED RELEASE ORAL DAILY
Qty: 90 TABLET | Refills: 3 | Status: SHIPPED | OUTPATIENT
Start: 2018-07-18 | End: 2019-07-17 | Stop reason: SDUPTHER

## 2018-07-26 ENCOUNTER — OFFICE VISIT (OUTPATIENT)
Dept: CARDIOLOGY | Facility: CLINIC | Age: 73
End: 2018-07-26

## 2018-07-26 VITALS
HEART RATE: 75 BPM | HEIGHT: 62 IN | OXYGEN SATURATION: 96 % | DIASTOLIC BLOOD PRESSURE: 80 MMHG | SYSTOLIC BLOOD PRESSURE: 126 MMHG | BODY MASS INDEX: 27.97 KG/M2 | WEIGHT: 152 LBS

## 2018-07-26 DIAGNOSIS — Z79.01 CHRONIC ANTICOAGULATION: ICD-10-CM

## 2018-07-26 DIAGNOSIS — I48.0 PAF (PAROXYSMAL ATRIAL FIBRILLATION) (HCC): Primary | ICD-10-CM

## 2018-07-26 PROCEDURE — 99213 OFFICE O/P EST LOW 20 MIN: CPT | Performed by: INTERNAL MEDICINE

## 2018-07-26 PROCEDURE — 93000 ELECTROCARDIOGRAM COMPLETE: CPT | Performed by: INTERNAL MEDICINE

## 2018-08-20 ENCOUNTER — TELEPHONE (OUTPATIENT)
Dept: CARDIOLOGY | Facility: CLINIC | Age: 73
End: 2018-08-20

## 2018-09-10 ENCOUNTER — TELEPHONE (OUTPATIENT)
Dept: CARDIOLOGY | Facility: CLINIC | Age: 73
End: 2018-09-10

## 2018-11-14 RX ORDER — APIXABAN 5 MG/1
TABLET, FILM COATED ORAL
Qty: 60 TABLET | Refills: 1 | Status: SHIPPED | OUTPATIENT
Start: 2018-11-14 | End: 2019-01-10 | Stop reason: SDUPTHER

## 2018-11-15 ENCOUNTER — OFFICE VISIT (OUTPATIENT)
Dept: CARDIOLOGY | Facility: CLINIC | Age: 73
End: 2018-11-15

## 2018-11-15 VITALS
WEIGHT: 154 LBS | HEIGHT: 62 IN | DIASTOLIC BLOOD PRESSURE: 72 MMHG | SYSTOLIC BLOOD PRESSURE: 122 MMHG | BODY MASS INDEX: 28.34 KG/M2 | HEART RATE: 63 BPM | OXYGEN SATURATION: 100 %

## 2018-11-15 DIAGNOSIS — Z79.01 CHRONIC ANTICOAGULATION: ICD-10-CM

## 2018-11-15 DIAGNOSIS — R94.31 ABNORMAL EKG: ICD-10-CM

## 2018-11-15 DIAGNOSIS — I48.0 PAF (PAROXYSMAL ATRIAL FIBRILLATION) (HCC): Primary | ICD-10-CM

## 2018-11-15 PROCEDURE — 93000 ELECTROCARDIOGRAM COMPLETE: CPT | Performed by: INTERNAL MEDICINE

## 2018-11-15 PROCEDURE — 99213 OFFICE O/P EST LOW 20 MIN: CPT | Performed by: INTERNAL MEDICINE

## 2018-11-19 ENCOUNTER — TELEPHONE (OUTPATIENT)
Dept: CARDIOLOGY | Facility: CLINIC | Age: 73
End: 2018-11-19

## 2019-01-10 RX ORDER — APIXABAN 5 MG/1
TABLET, FILM COATED ORAL
Qty: 60 TABLET | Refills: 1 | Status: SHIPPED | OUTPATIENT
Start: 2019-01-10 | End: 2019-03-12 | Stop reason: SDUPTHER

## 2019-03-12 RX ORDER — APIXABAN 5 MG/1
TABLET, FILM COATED ORAL
Qty: 60 TABLET | Refills: 1 | Status: SHIPPED | OUTPATIENT
Start: 2019-03-12 | End: 2019-05-29 | Stop reason: SDUPTHER

## 2019-03-13 ENCOUNTER — TELEPHONE (OUTPATIENT)
Dept: CARDIOLOGY | Facility: CLINIC | Age: 74
End: 2019-03-13

## 2019-03-21 ENCOUNTER — OFFICE VISIT (OUTPATIENT)
Dept: CARDIOLOGY | Facility: CLINIC | Age: 74
End: 2019-03-21

## 2019-03-21 VITALS
HEART RATE: 60 BPM | BODY MASS INDEX: 28.52 KG/M2 | OXYGEN SATURATION: 99 % | HEIGHT: 62 IN | DIASTOLIC BLOOD PRESSURE: 58 MMHG | WEIGHT: 155 LBS | SYSTOLIC BLOOD PRESSURE: 100 MMHG

## 2019-03-21 DIAGNOSIS — I48.0 PAF (PAROXYSMAL ATRIAL FIBRILLATION) (HCC): Primary | ICD-10-CM

## 2019-03-21 DIAGNOSIS — Z79.01 CHRONIC ANTICOAGULATION: ICD-10-CM

## 2019-03-21 PROCEDURE — 93000 ELECTROCARDIOGRAM COMPLETE: CPT | Performed by: INTERNAL MEDICINE

## 2019-03-21 PROCEDURE — 99213 OFFICE O/P EST LOW 20 MIN: CPT | Performed by: INTERNAL MEDICINE

## 2019-05-04 ENCOUNTER — HOSPITAL ENCOUNTER (EMERGENCY)
Facility: HOSPITAL | Age: 74
Discharge: HOME | End: 2019-05-04
Attending: EMERGENCY MEDICINE | Admitting: EMERGENCY MEDICINE

## 2019-05-04 ENCOUNTER — APPOINTMENT (OUTPATIENT)
Dept: CT IMAGING | Facility: HOSPITAL | Age: 74
End: 2019-05-04

## 2019-05-04 ENCOUNTER — APPOINTMENT (OUTPATIENT)
Dept: GENERAL RADIOLOGY | Facility: HOSPITAL | Age: 74
End: 2019-05-04

## 2019-05-04 VITALS
RESPIRATION RATE: 18 BRPM | TEMPERATURE: 98.4 F | DIASTOLIC BLOOD PRESSURE: 76 MMHG | HEIGHT: 63 IN | BODY MASS INDEX: 27.29 KG/M2 | SYSTOLIC BLOOD PRESSURE: 125 MMHG | OXYGEN SATURATION: 100 % | WEIGHT: 154 LBS | HEART RATE: 72 BPM

## 2019-05-04 DIAGNOSIS — R40.4 TRANSIENT ALTERATION OF AWARENESS: Primary | ICD-10-CM

## 2019-05-04 LAB
ALBUMIN SERPL-MCNC: 4.53 G/DL (ref 3.5–5.2)
ALBUMIN/GLOB SERPL: 1.8 G/DL
ALP SERPL-CCNC: 94 U/L (ref 39–117)
ALT SERPL W P-5'-P-CCNC: 9 U/L (ref 1–33)
ANION GAP SERPL CALCULATED.3IONS-SCNC: 14.7 MMOL/L
AST SERPL-CCNC: 16 U/L (ref 1–32)
BASOPHILS # BLD AUTO: 0.04 10*3/MM3 (ref 0–0.2)
BASOPHILS NFR BLD AUTO: 0.5 % (ref 0–1.5)
BILIRUB SERPL-MCNC: 0.6 MG/DL (ref 0.2–1.2)
BILIRUB UR QL STRIP: NEGATIVE
BUN BLD-MCNC: 8 MG/DL (ref 8–23)
BUN/CREAT SERPL: 10.4 (ref 7–25)
CALCIUM SPEC-SCNC: 9.8 MG/DL (ref 8.6–10.5)
CHLORIDE SERPL-SCNC: 101 MMOL/L (ref 98–107)
CLARITY UR: CLEAR
CO2 SERPL-SCNC: 25.3 MMOL/L (ref 22–29)
COLOR UR: YELLOW
CREAT BLD-MCNC: 0.77 MG/DL (ref 0.57–1)
D-LACTATE SERPL-SCNC: 1.5 MMOL/L (ref 0.5–2)
DEPRECATED RDW RBC AUTO: 42.9 FL (ref 37–54)
EOSINOPHIL # BLD AUTO: 0.14 10*3/MM3 (ref 0–0.4)
EOSINOPHIL NFR BLD AUTO: 1.7 % (ref 0.3–6.2)
ERYTHROCYTE [DISTWIDTH] IN BLOOD BY AUTOMATED COUNT: 13 % (ref 12.3–15.4)
GFR SERPL CREATININE-BSD FRML MDRD: 74 ML/MIN/1.73
GLOBULIN UR ELPH-MCNC: 2.6 GM/DL
GLUCOSE BLD-MCNC: 107 MG/DL (ref 65–99)
GLUCOSE UR STRIP-MCNC: NEGATIVE MG/DL
HCT VFR BLD AUTO: 39 % (ref 34–46.6)
HGB BLD-MCNC: 12.8 G/DL (ref 12–15.9)
HGB UR QL STRIP.AUTO: NEGATIVE
HOLD SPECIMEN: NORMAL
HOLD SPECIMEN: NORMAL
IMM GRANULOCYTES # BLD AUTO: 0.01 10*3/MM3 (ref 0–0.05)
IMM GRANULOCYTES NFR BLD AUTO: 0.1 % (ref 0–0.5)
KETONES UR QL STRIP: NEGATIVE
LEUKOCYTE ESTERASE UR QL STRIP.AUTO: NEGATIVE
LIPASE SERPL-CCNC: 14 U/L (ref 13–60)
LYMPHOCYTES # BLD AUTO: 1.9 10*3/MM3 (ref 0.7–3.1)
LYMPHOCYTES NFR BLD AUTO: 23.7 % (ref 19.6–45.3)
MAGNESIUM SERPL-MCNC: 2.1 MG/DL (ref 1.6–2.4)
MCH RBC QN AUTO: 30.5 PG (ref 26.6–33)
MCHC RBC AUTO-ENTMCNC: 32.8 G/DL (ref 31.5–35.7)
MCV RBC AUTO: 92.9 FL (ref 79–97)
MONOCYTES # BLD AUTO: 0.38 10*3/MM3 (ref 0.1–0.9)
MONOCYTES NFR BLD AUTO: 4.7 % (ref 5–12)
NEUTROPHILS # BLD AUTO: 5.55 10*3/MM3 (ref 1.7–7)
NEUTROPHILS NFR BLD AUTO: 69.3 % (ref 42.7–76)
NITRITE UR QL STRIP: NEGATIVE
PH UR STRIP.AUTO: 6.5 [PH] (ref 5–8)
PLATELET # BLD AUTO: 247 10*3/MM3 (ref 140–450)
PMV BLD AUTO: 9.2 FL (ref 6–12)
POTASSIUM BLD-SCNC: 4.2 MMOL/L (ref 3.5–5.2)
PROT SERPL-MCNC: 7.1 G/DL (ref 6–8.5)
PROT UR QL STRIP: NEGATIVE
RBC # BLD AUTO: 4.2 10*6/MM3 (ref 3.77–5.28)
SODIUM BLD-SCNC: 141 MMOL/L (ref 136–145)
SP GR UR STRIP: <=1.005 (ref 1–1.03)
TROPONIN T SERPL-MCNC: <0.01 NG/ML (ref 0–0.03)
TSH SERPL DL<=0.05 MIU/L-ACNC: 2.12 MIU/ML (ref 0.27–4.2)
UROBILINOGEN UR QL STRIP: NORMAL
WBC NRBC COR # BLD: 8.02 10*3/MM3 (ref 3.4–10.8)
WHOLE BLOOD HOLD SPECIMEN: NORMAL
WHOLE BLOOD HOLD SPECIMEN: NORMAL

## 2019-05-04 PROCEDURE — 96374 THER/PROPH/DIAG INJ IV PUSH: CPT

## 2019-05-04 PROCEDURE — 83690 ASSAY OF LIPASE: CPT | Performed by: EMERGENCY MEDICINE

## 2019-05-04 PROCEDURE — 83735 ASSAY OF MAGNESIUM: CPT | Performed by: EMERGENCY MEDICINE

## 2019-05-04 PROCEDURE — 70450 CT HEAD/BRAIN W/O DYE: CPT | Performed by: RADIOLOGY

## 2019-05-04 PROCEDURE — 71045 X-RAY EXAM CHEST 1 VIEW: CPT

## 2019-05-04 PROCEDURE — 84443 ASSAY THYROID STIM HORMONE: CPT | Performed by: EMERGENCY MEDICINE

## 2019-05-04 PROCEDURE — 80053 COMPREHEN METABOLIC PANEL: CPT | Performed by: EMERGENCY MEDICINE

## 2019-05-04 PROCEDURE — 93005 ELECTROCARDIOGRAM TRACING: CPT | Performed by: EMERGENCY MEDICINE

## 2019-05-04 PROCEDURE — 70450 CT HEAD/BRAIN W/O DYE: CPT

## 2019-05-04 PROCEDURE — 99284 EMERGENCY DEPT VISIT MOD MDM: CPT

## 2019-05-04 PROCEDURE — 25010000002 LORAZEPAM PER 2 MG: Performed by: EMERGENCY MEDICINE

## 2019-05-04 PROCEDURE — 83605 ASSAY OF LACTIC ACID: CPT | Performed by: EMERGENCY MEDICINE

## 2019-05-04 PROCEDURE — 84484 ASSAY OF TROPONIN QUANT: CPT | Performed by: EMERGENCY MEDICINE

## 2019-05-04 PROCEDURE — 93010 ELECTROCARDIOGRAM REPORT: CPT | Performed by: INTERNAL MEDICINE

## 2019-05-04 PROCEDURE — 81003 URINALYSIS AUTO W/O SCOPE: CPT | Performed by: EMERGENCY MEDICINE

## 2019-05-04 PROCEDURE — 85025 COMPLETE CBC W/AUTO DIFF WBC: CPT | Performed by: EMERGENCY MEDICINE

## 2019-05-04 PROCEDURE — 71045 X-RAY EXAM CHEST 1 VIEW: CPT | Performed by: RADIOLOGY

## 2019-05-04 RX ORDER — LORAZEPAM 2 MG/ML
0.5 INJECTION INTRAMUSCULAR ONCE
Status: COMPLETED | OUTPATIENT
Start: 2019-05-04 | End: 2019-05-04

## 2019-05-04 RX ORDER — SODIUM CHLORIDE 0.9 % (FLUSH) 0.9 %
10 SYRINGE (ML) INJECTION AS NEEDED
Status: DISCONTINUED | OUTPATIENT
Start: 2019-05-04 | End: 2019-05-04 | Stop reason: HOSPADM

## 2019-05-04 RX ADMIN — LORAZEPAM 0.5 MG: 2 INJECTION INTRAMUSCULAR; INTRAVENOUS at 16:48

## 2019-05-20 ENCOUNTER — TELEPHONE (OUTPATIENT)
Dept: CARDIOLOGY | Facility: CLINIC | Age: 74
End: 2019-05-20

## 2019-05-29 RX ORDER — APIXABAN 5 MG/1
TABLET, FILM COATED ORAL
Qty: 60 TABLET | Refills: 1 | Status: SHIPPED | OUTPATIENT
Start: 2019-05-29 | End: 2019-07-17 | Stop reason: SDUPTHER

## 2019-07-17 ENCOUNTER — OFFICE VISIT (OUTPATIENT)
Dept: CARDIOLOGY | Facility: CLINIC | Age: 74
End: 2019-07-17

## 2019-07-17 VITALS
HEIGHT: 65 IN | HEART RATE: 77 BPM | OXYGEN SATURATION: 98 % | DIASTOLIC BLOOD PRESSURE: 76 MMHG | SYSTOLIC BLOOD PRESSURE: 118 MMHG | WEIGHT: 156 LBS | BODY MASS INDEX: 25.99 KG/M2

## 2019-07-17 DIAGNOSIS — R94.31 ABNORMAL EKG: ICD-10-CM

## 2019-07-17 DIAGNOSIS — Z79.01 CHRONIC ANTICOAGULATION: ICD-10-CM

## 2019-07-17 DIAGNOSIS — I48.0 PAF (PAROXYSMAL ATRIAL FIBRILLATION) (HCC): Primary | ICD-10-CM

## 2019-07-17 PROCEDURE — 99213 OFFICE O/P EST LOW 20 MIN: CPT | Performed by: INTERNAL MEDICINE

## 2019-07-17 RX ORDER — METOPROLOL SUCCINATE 25 MG/1
25 TABLET, EXTENDED RELEASE ORAL DAILY
Qty: 90 TABLET | Refills: 3 | Status: SHIPPED | OUTPATIENT
Start: 2019-07-17

## 2021-02-12 DIAGNOSIS — Z23 IMMUNIZATION DUE: ICD-10-CM

## 2024-10-26 ENCOUNTER — HOSPITAL ENCOUNTER (EMERGENCY)
Facility: HOSPITAL | Age: 79
Discharge: HOME | End: 2024-10-26
Attending: EMERGENCY MEDICINE
Payer: MEDICARE

## 2024-10-26 ENCOUNTER — APPOINTMENT (OUTPATIENT)
Dept: CT IMAGING | Facility: HOSPITAL | Age: 79
End: 2024-10-26
Payer: MEDICARE

## 2024-10-26 VITALS
WEIGHT: 173 LBS | SYSTOLIC BLOOD PRESSURE: 131 MMHG | BODY MASS INDEX: 31.83 KG/M2 | HEART RATE: 55 BPM | DIASTOLIC BLOOD PRESSURE: 75 MMHG | TEMPERATURE: 98.3 F | OXYGEN SATURATION: 99 % | RESPIRATION RATE: 16 BRPM | HEIGHT: 62 IN

## 2024-10-26 DIAGNOSIS — N30.00 ACUTE CYSTITIS WITHOUT HEMATURIA: Primary | ICD-10-CM

## 2024-10-26 LAB
ALBUMIN SERPL-MCNC: 3.9 G/DL (ref 3.5–5.2)
ALBUMIN/GLOB SERPL: 1.3 G/DL
ALP SERPL-CCNC: 112 U/L (ref 39–117)
ALT SERPL W P-5'-P-CCNC: 12 U/L (ref 1–33)
ANION GAP SERPL CALCULATED.3IONS-SCNC: 10.7 MMOL/L (ref 5–15)
AST SERPL-CCNC: 15 U/L (ref 1–32)
BASOPHILS # BLD AUTO: 0.05 10*3/MM3 (ref 0–0.2)
BASOPHILS NFR BLD AUTO: 0.5 % (ref 0–1.5)
BILIRUB SERPL-MCNC: 0.8 MG/DL (ref 0–1.2)
BILIRUB UR QL STRIP: NEGATIVE
BUN SERPL-MCNC: 12 MG/DL (ref 8–23)
BUN/CREAT SERPL: 13.8 (ref 7–25)
CALCIUM SPEC-SCNC: 9.2 MG/DL (ref 8.6–10.5)
CHLORIDE SERPL-SCNC: 101 MMOL/L (ref 98–107)
CLARITY UR: CLEAR
CO2 SERPL-SCNC: 26.3 MMOL/L (ref 22–29)
COLOR UR: YELLOW
CREAT SERPL-MCNC: 0.87 MG/DL (ref 0.57–1)
CRP SERPL-MCNC: 1.83 MG/DL (ref 0–0.5)
DEPRECATED RDW RBC AUTO: 45.7 FL (ref 37–54)
EGFRCR SERPLBLD CKD-EPI 2021: 68.3 ML/MIN/1.73
EOSINOPHIL # BLD AUTO: 0.17 10*3/MM3 (ref 0–0.4)
EOSINOPHIL NFR BLD AUTO: 1.7 % (ref 0.3–6.2)
ERYTHROCYTE [DISTWIDTH] IN BLOOD BY AUTOMATED COUNT: 13 % (ref 12.3–15.4)
ERYTHROCYTE [SEDIMENTATION RATE] IN BLOOD: 37 MM/HR (ref 0–30)
GLOBULIN UR ELPH-MCNC: 3 GM/DL
GLUCOSE SERPL-MCNC: 111 MG/DL (ref 65–99)
GLUCOSE UR STRIP-MCNC: NEGATIVE MG/DL
HCT VFR BLD AUTO: 40.8 % (ref 34–46.6)
HGB BLD-MCNC: 13.1 G/DL (ref 12–15.9)
HGB UR QL STRIP.AUTO: NEGATIVE
HOLD SPECIMEN: NORMAL
HOLD SPECIMEN: NORMAL
IMM GRANULOCYTES # BLD AUTO: 0.05 10*3/MM3 (ref 0–0.05)
IMM GRANULOCYTES NFR BLD AUTO: 0.5 % (ref 0–0.5)
KETONES UR QL STRIP: ABNORMAL
LEUKOCYTE ESTERASE UR QL STRIP.AUTO: NEGATIVE
LYMPHOCYTES # BLD AUTO: 1.75 10*3/MM3 (ref 0.7–3.1)
LYMPHOCYTES NFR BLD AUTO: 17.2 % (ref 19.6–45.3)
MCH RBC QN AUTO: 30.4 PG (ref 26.6–33)
MCHC RBC AUTO-ENTMCNC: 32.1 G/DL (ref 31.5–35.7)
MCV RBC AUTO: 94.7 FL (ref 79–97)
MONOCYTES # BLD AUTO: 0.51 10*3/MM3 (ref 0.1–0.9)
MONOCYTES NFR BLD AUTO: 5 % (ref 5–12)
NEUTROPHILS NFR BLD AUTO: 7.63 10*3/MM3 (ref 1.7–7)
NEUTROPHILS NFR BLD AUTO: 75.1 % (ref 42.7–76)
NITRITE UR QL STRIP: NEGATIVE
NRBC BLD AUTO-RTO: 0 /100 WBC (ref 0–0.2)
PH UR STRIP.AUTO: 5.5 [PH] (ref 5–8)
PLATELET # BLD AUTO: 264 10*3/MM3 (ref 140–450)
PMV BLD AUTO: 8.4 FL (ref 6–12)
POTASSIUM SERPL-SCNC: 4.1 MMOL/L (ref 3.5–5.2)
PROT SERPL-MCNC: 6.9 G/DL (ref 6–8.5)
PROT UR QL STRIP: NEGATIVE
RBC # BLD AUTO: 4.31 10*6/MM3 (ref 3.77–5.28)
SODIUM SERPL-SCNC: 138 MMOL/L (ref 136–145)
SP GR UR STRIP: 1.02 (ref 1–1.03)
UROBILINOGEN UR QL STRIP: ABNORMAL
WBC NRBC COR # BLD AUTO: 10.16 10*3/MM3 (ref 3.4–10.8)
WHOLE BLOOD HOLD COAG: NORMAL
WHOLE BLOOD HOLD SPECIMEN: NORMAL

## 2024-10-26 PROCEDURE — 86140 C-REACTIVE PROTEIN: CPT

## 2024-10-26 PROCEDURE — 85025 COMPLETE CBC W/AUTO DIFF WBC: CPT

## 2024-10-26 PROCEDURE — 74176 CT ABD & PELVIS W/O CONTRAST: CPT

## 2024-10-26 PROCEDURE — 25010000002 KETOROLAC TROMETHAMINE PER 15 MG

## 2024-10-26 PROCEDURE — 80053 COMPREHEN METABOLIC PANEL: CPT

## 2024-10-26 PROCEDURE — 36415 COLL VENOUS BLD VENIPUNCTURE: CPT

## 2024-10-26 PROCEDURE — 96372 THER/PROPH/DIAG INJ SC/IM: CPT

## 2024-10-26 PROCEDURE — 99284 EMERGENCY DEPT VISIT MOD MDM: CPT

## 2024-10-26 PROCEDURE — 85652 RBC SED RATE AUTOMATED: CPT

## 2024-10-26 PROCEDURE — 74176 CT ABD & PELVIS W/O CONTRAST: CPT | Performed by: RADIOLOGY

## 2024-10-26 PROCEDURE — 81003 URINALYSIS AUTO W/O SCOPE: CPT

## 2024-10-26 RX ORDER — IBUPROFEN 600 MG/1
600 TABLET, FILM COATED ORAL EVERY 8 HOURS PRN
Qty: 30 TABLET | Refills: 0 | Status: SHIPPED | OUTPATIENT
Start: 2024-10-26

## 2024-10-26 RX ORDER — KETOROLAC TROMETHAMINE 30 MG/ML
30 INJECTION, SOLUTION INTRAMUSCULAR; INTRAVENOUS ONCE
Status: COMPLETED | OUTPATIENT
Start: 2024-10-26 | End: 2024-10-26

## 2024-10-26 RX ORDER — CEFDINIR 300 MG/1
300 CAPSULE ORAL 2 TIMES DAILY
Qty: 14 CAPSULE | Refills: 0 | Status: SHIPPED | OUTPATIENT
Start: 2024-10-26

## 2024-10-26 RX ADMIN — KETOROLAC TROMETHAMINE 30 MG: 30 INJECTION, SOLUTION INTRAMUSCULAR; INTRAVENOUS at 23:43

## 2025-05-02 ENCOUNTER — APPOINTMENT (OUTPATIENT)
Dept: GENERAL RADIOLOGY | Facility: HOSPITAL | Age: 80
DRG: 488 | End: 2025-05-02
Payer: MEDICARE

## 2025-05-02 ENCOUNTER — HOSPITAL ENCOUNTER (INPATIENT)
Facility: HOSPITAL | Age: 80
LOS: 4 days | Discharge: NURSING, REHAB OR LONG-TERM CARE | DRG: 488 | End: 2025-05-09
Attending: STUDENT IN AN ORGANIZED HEALTH CARE EDUCATION/TRAINING PROGRAM | Admitting: INTERNAL MEDICINE
Payer: MEDICARE

## 2025-05-02 ENCOUNTER — APPOINTMENT (OUTPATIENT)
Dept: CT IMAGING | Facility: HOSPITAL | Age: 80
DRG: 488 | End: 2025-05-02
Payer: MEDICARE

## 2025-05-02 DIAGNOSIS — S82.141A CLOSED FRACTURE OF RIGHT TIBIAL PLATEAU, INITIAL ENCOUNTER: Primary | ICD-10-CM

## 2025-05-02 DIAGNOSIS — F41.9 ANXIETY: ICD-10-CM

## 2025-05-02 LAB
ALBUMIN SERPL-MCNC: 4.2 G/DL (ref 3.5–5.2)
ALBUMIN/GLOB SERPL: 1.2 G/DL
ALP SERPL-CCNC: 120 U/L (ref 39–117)
ALT SERPL W P-5'-P-CCNC: 14 U/L (ref 1–33)
ANION GAP SERPL CALCULATED.3IONS-SCNC: 16.3 MMOL/L (ref 5–15)
AST SERPL-CCNC: 20 U/L (ref 1–32)
BASOPHILS # BLD AUTO: 0.1 10*3/MM3 (ref 0–0.2)
BASOPHILS NFR BLD AUTO: 0.9 % (ref 0–1.5)
BILIRUB SERPL-MCNC: 1.1 MG/DL (ref 0–1.2)
BUN SERPL-MCNC: 11 MG/DL (ref 8–23)
BUN/CREAT SERPL: 11.5 (ref 7–25)
CALCIUM SPEC-SCNC: 9.7 MG/DL (ref 8.6–10.5)
CHLORIDE SERPL-SCNC: 97 MMOL/L (ref 98–107)
CO2 SERPL-SCNC: 21.7 MMOL/L (ref 22–29)
CREAT SERPL-MCNC: 0.96 MG/DL (ref 0.57–1)
DEPRECATED RDW RBC AUTO: 46.6 FL (ref 37–54)
EGFRCR SERPLBLD CKD-EPI 2021: 60.7 ML/MIN/1.73
EOSINOPHIL # BLD AUTO: 0.42 10*3/MM3 (ref 0–0.4)
EOSINOPHIL NFR BLD AUTO: 3.6 % (ref 0.3–6.2)
ERYTHROCYTE [DISTWIDTH] IN BLOOD BY AUTOMATED COUNT: 13.3 % (ref 12.3–15.4)
GLOBULIN UR ELPH-MCNC: 3.4 GM/DL
GLUCOSE SERPL-MCNC: 160 MG/DL (ref 65–99)
HCT VFR BLD AUTO: 44.1 % (ref 34–46.6)
HGB BLD-MCNC: 13.7 G/DL (ref 12–15.9)
HOLD SPECIMEN: NORMAL
HOLD SPECIMEN: NORMAL
IMM GRANULOCYTES # BLD AUTO: 0.03 10*3/MM3 (ref 0–0.05)
IMM GRANULOCYTES NFR BLD AUTO: 0.3 % (ref 0–0.5)
LYMPHOCYTES # BLD AUTO: 2.13 10*3/MM3 (ref 0.7–3.1)
LYMPHOCYTES NFR BLD AUTO: 18.2 % (ref 19.6–45.3)
MCH RBC QN AUTO: 29.6 PG (ref 26.6–33)
MCHC RBC AUTO-ENTMCNC: 31.1 G/DL (ref 31.5–35.7)
MCV RBC AUTO: 95.2 FL (ref 79–97)
MONOCYTES # BLD AUTO: 0.51 10*3/MM3 (ref 0.1–0.9)
MONOCYTES NFR BLD AUTO: 4.4 % (ref 5–12)
NEUTROPHILS NFR BLD AUTO: 72.6 % (ref 42.7–76)
NEUTROPHILS NFR BLD AUTO: 8.53 10*3/MM3 (ref 1.7–7)
NRBC BLD AUTO-RTO: 0 /100 WBC (ref 0–0.2)
PLATELET # BLD AUTO: 286 10*3/MM3 (ref 140–450)
PMV BLD AUTO: 8.8 FL (ref 6–12)
POTASSIUM SERPL-SCNC: 4.3 MMOL/L (ref 3.5–5.2)
PROT SERPL-MCNC: 7.6 G/DL (ref 6–8.5)
QT INTERVAL: 390 MS
QTC INTERVAL: 477 MS
RBC # BLD AUTO: 4.63 10*6/MM3 (ref 3.77–5.28)
SODIUM SERPL-SCNC: 135 MMOL/L (ref 136–145)
WBC NRBC COR # BLD AUTO: 11.72 10*3/MM3 (ref 3.4–10.8)
WHOLE BLOOD HOLD COAG: NORMAL
WHOLE BLOOD HOLD SPECIMEN: NORMAL

## 2025-05-02 PROCEDURE — 73030 X-RAY EXAM OF SHOULDER: CPT | Performed by: RADIOLOGY

## 2025-05-02 PROCEDURE — 73700 CT LOWER EXTREMITY W/O DYE: CPT

## 2025-05-02 PROCEDURE — 71045 X-RAY EXAM CHEST 1 VIEW: CPT | Performed by: RADIOLOGY

## 2025-05-02 PROCEDURE — G0378 HOSPITAL OBSERVATION PER HR: HCPCS

## 2025-05-02 PROCEDURE — 73610 X-RAY EXAM OF ANKLE: CPT

## 2025-05-02 PROCEDURE — 73590 X-RAY EXAM OF LOWER LEG: CPT

## 2025-05-02 PROCEDURE — 73552 X-RAY EXAM OF FEMUR 2/>: CPT | Performed by: RADIOLOGY

## 2025-05-02 PROCEDURE — 73700 CT LOWER EXTREMITY W/O DYE: CPT | Performed by: RADIOLOGY

## 2025-05-02 PROCEDURE — 85025 COMPLETE CBC W/AUTO DIFF WBC: CPT | Performed by: PHYSICIAN ASSISTANT

## 2025-05-02 PROCEDURE — 93005 ELECTROCARDIOGRAM TRACING: CPT | Performed by: PHYSICIAN ASSISTANT

## 2025-05-02 PROCEDURE — 84443 ASSAY THYROID STIM HORMONE: CPT

## 2025-05-02 PROCEDURE — 36415 COLL VENOUS BLD VENIPUNCTURE: CPT

## 2025-05-02 PROCEDURE — 73590 X-RAY EXAM OF LOWER LEG: CPT | Performed by: RADIOLOGY

## 2025-05-02 PROCEDURE — 83735 ASSAY OF MAGNESIUM: CPT

## 2025-05-02 PROCEDURE — 71045 X-RAY EXAM CHEST 1 VIEW: CPT

## 2025-05-02 PROCEDURE — 73552 X-RAY EXAM OF FEMUR 2/>: CPT

## 2025-05-02 PROCEDURE — 99223 1ST HOSP IP/OBS HIGH 75: CPT

## 2025-05-02 PROCEDURE — 25010000002 MORPHINE PER 10 MG: Performed by: STUDENT IN AN ORGANIZED HEALTH CARE EDUCATION/TRAINING PROGRAM

## 2025-05-02 PROCEDURE — 80053 COMPREHEN METABOLIC PANEL: CPT | Performed by: PHYSICIAN ASSISTANT

## 2025-05-02 PROCEDURE — 73030 X-RAY EXAM OF SHOULDER: CPT

## 2025-05-02 PROCEDURE — 73610 X-RAY EXAM OF ANKLE: CPT | Performed by: RADIOLOGY

## 2025-05-02 PROCEDURE — 84145 PROCALCITONIN (PCT): CPT | Performed by: INTERNAL MEDICINE

## 2025-05-02 PROCEDURE — 99285 EMERGENCY DEPT VISIT HI MDM: CPT

## 2025-05-02 RX ORDER — METHOCARBAMOL 500 MG/1
500 TABLET, FILM COATED ORAL EVERY 8 HOURS PRN
Status: DISCONTINUED | OUTPATIENT
Start: 2025-05-02 | End: 2025-05-09 | Stop reason: HOSPADM

## 2025-05-02 RX ORDER — DIVALPROEX SODIUM 125 MG/1
125 TABLET, DELAYED RELEASE ORAL DAILY PRN
Status: CANCELLED | OUTPATIENT
Start: 2025-05-02

## 2025-05-02 RX ORDER — GLUCAGON 1 MG/ML
1 KIT INJECTION
Status: DISCONTINUED | OUTPATIENT
Start: 2025-05-02 | End: 2025-05-09 | Stop reason: HOSPADM

## 2025-05-02 RX ORDER — DIAZEPAM 5 MG/1
5 TABLET ORAL DAILY
Status: CANCELLED | OUTPATIENT
Start: 2025-05-03

## 2025-05-02 RX ORDER — MECOBALAMIN 5000 MCG
15 TABLET,DISINTEGRATING ORAL DAILY
COMMUNITY

## 2025-05-02 RX ORDER — FUROSEMIDE 20 MG/1
20 TABLET ORAL DAILY
COMMUNITY
End: 2025-05-09 | Stop reason: HOSPADM

## 2025-05-02 RX ORDER — DIAZEPAM 5 MG/1
5 TABLET ORAL ONCE
Status: COMPLETED | OUTPATIENT
Start: 2025-05-02 | End: 2025-05-02

## 2025-05-02 RX ORDER — AMOXICILLIN 250 MG
2 CAPSULE ORAL 2 TIMES DAILY
Status: DISCONTINUED | OUTPATIENT
Start: 2025-05-03 | End: 2025-05-09 | Stop reason: HOSPADM

## 2025-05-02 RX ORDER — SODIUM CHLORIDE 9 MG/ML
40 INJECTION, SOLUTION INTRAVENOUS AS NEEDED
Status: DISCONTINUED | OUTPATIENT
Start: 2025-05-02 | End: 2025-05-09 | Stop reason: HOSPADM

## 2025-05-02 RX ORDER — DEXTROSE MONOHYDRATE 25 G/50ML
25 INJECTION, SOLUTION INTRAVENOUS
Status: DISCONTINUED | OUTPATIENT
Start: 2025-05-02 | End: 2025-05-09 | Stop reason: HOSPADM

## 2025-05-02 RX ORDER — SOTALOL HYDROCHLORIDE 80 MG/1
80 TABLET ORAL ONCE
Status: COMPLETED | OUTPATIENT
Start: 2025-05-02 | End: 2025-05-02

## 2025-05-02 RX ORDER — SODIUM CHLORIDE 0.9 % (FLUSH) 0.9 %
10 SYRINGE (ML) INJECTION EVERY 12 HOURS SCHEDULED
Status: DISCONTINUED | OUTPATIENT
Start: 2025-05-03 | End: 2025-05-09 | Stop reason: HOSPADM

## 2025-05-02 RX ORDER — DIVALPROEX SODIUM 125 MG/1
125 TABLET, DELAYED RELEASE ORAL DAILY PRN
COMMUNITY

## 2025-05-02 RX ORDER — SODIUM CHLORIDE 0.9 % (FLUSH) 0.9 %
10 SYRINGE (ML) INJECTION AS NEEDED
Status: DISCONTINUED | OUTPATIENT
Start: 2025-05-02 | End: 2025-05-09 | Stop reason: HOSPADM

## 2025-05-02 RX ORDER — PANTOPRAZOLE SODIUM 40 MG/1
40 TABLET, DELAYED RELEASE ORAL
Status: CANCELLED | OUTPATIENT
Start: 2025-05-03

## 2025-05-02 RX ORDER — FUROSEMIDE 20 MG/1
20 TABLET ORAL DAILY
Status: CANCELLED | OUTPATIENT
Start: 2025-05-03

## 2025-05-02 RX ORDER — SOTALOL HYDROCHLORIDE 80 MG/1
80 TABLET ORAL 2 TIMES DAILY
COMMUNITY

## 2025-05-02 RX ORDER — MORPHINE SULFATE 2 MG/ML
2 INJECTION, SOLUTION INTRAMUSCULAR; INTRAVENOUS ONCE
Status: COMPLETED | OUTPATIENT
Start: 2025-05-02 | End: 2025-05-02

## 2025-05-02 RX ORDER — NICOTINE POLACRILEX 4 MG
15 LOZENGE BUCCAL
Status: DISCONTINUED | OUTPATIENT
Start: 2025-05-02 | End: 2025-05-09 | Stop reason: HOSPADM

## 2025-05-02 RX ORDER — SOTALOL HYDROCHLORIDE 80 MG/1
80 TABLET ORAL 2 TIMES DAILY
Status: CANCELLED | OUTPATIENT
Start: 2025-05-03

## 2025-05-02 RX ORDER — ACETAMINOPHEN 500 MG
1000 TABLET ORAL EVERY 8 HOURS
Status: DISCONTINUED | OUTPATIENT
Start: 2025-05-03 | End: 2025-05-09 | Stop reason: HOSPADM

## 2025-05-02 RX ORDER — BISACODYL 10 MG
10 SUPPOSITORY, RECTAL RECTAL DAILY PRN
Status: DISCONTINUED | OUTPATIENT
Start: 2025-05-02 | End: 2025-05-09 | Stop reason: HOSPADM

## 2025-05-02 RX ORDER — POLYETHYLENE GLYCOL 3350 17 G/17G
17 POWDER, FOR SOLUTION ORAL DAILY PRN
Status: DISCONTINUED | OUTPATIENT
Start: 2025-05-02 | End: 2025-05-09 | Stop reason: HOSPADM

## 2025-05-02 RX ORDER — SODIUM CHLORIDE, SODIUM LACTATE, POTASSIUM CHLORIDE, CALCIUM CHLORIDE 600; 310; 30; 20 MG/100ML; MG/100ML; MG/100ML; MG/100ML
75 INJECTION, SOLUTION INTRAVENOUS CONTINUOUS
Status: DISCONTINUED | OUTPATIENT
Start: 2025-05-03 | End: 2025-05-03

## 2025-05-02 RX ORDER — BISACODYL 5 MG/1
5 TABLET, DELAYED RELEASE ORAL DAILY PRN
Status: DISCONTINUED | OUTPATIENT
Start: 2025-05-02 | End: 2025-05-09 | Stop reason: HOSPADM

## 2025-05-02 RX ORDER — MORPHINE SULFATE 2 MG/ML
1 INJECTION, SOLUTION INTRAMUSCULAR; INTRAVENOUS
Status: DISCONTINUED | OUTPATIENT
Start: 2025-05-02 | End: 2025-05-09 | Stop reason: HOSPADM

## 2025-05-02 RX ORDER — NITROGLYCERIN 0.4 MG/1
0.4 TABLET SUBLINGUAL
Status: DISCONTINUED | OUTPATIENT
Start: 2025-05-02 | End: 2025-05-09 | Stop reason: HOSPADM

## 2025-05-02 RX ADMIN — MORPHINE SULFATE 2 MG: 2 INJECTION, SOLUTION INTRAMUSCULAR; INTRAVENOUS at 20:59

## 2025-05-02 RX ADMIN — SOTALOL HYDROCHLORIDE 80 MG: 80 TABLET ORAL at 21:13

## 2025-05-02 RX ADMIN — DIAZEPAM 5 MG: 5 TABLET ORAL at 17:38

## 2025-05-02 NOTE — ED PROVIDER NOTES
Subjective   History of Present Illness  79-year-old female presents secondary to injury to her right lower extremity and right shoulder.  Patient was getting into her car.  She states her leg gave out.  She slammed between the door and the car.  She denies loss of consciousness.  Patient has been feeling fatigued recently.  No loss of consciousness.  Patient has a past medical history of paroxysmal A-fib, depression, anxiety, arthritis.  Patient presented by private vehicle.         Review of Systems   Constitutional: Negative.  Negative for fever.   HENT: Negative.     Respiratory: Negative.     Cardiovascular: Negative.  Negative for chest pain.   Gastrointestinal: Negative.  Negative for abdominal pain.   Endocrine: Negative.    Genitourinary: Negative.  Negative for dysuria.   Skin: Negative.    Neurological: Negative.    Psychiatric/Behavioral: Negative.     All other systems reviewed and are negative.      Past Medical History:   Diagnosis Date    Ankle edema     Anxiety     Arthritis     Atrial fibrillation     Back pain     Depression     Hernia, hiatal     PAF (paroxysmal atrial fibrillation)        Allergies   Allergen Reactions    Codeine Anaphylaxis    Diltiazem Anaphylaxis    Zinc Hives and Swelling    Levaquin [Levofloxacin] Rash    Penicillins Rash       Past Surgical History:   Procedure Laterality Date    APPENDECTOMY      CARDIAC CATHETERIZATION  12/2013    CHOLECYSTECTOMY      COLON SURGERY      COLONOSCOPY  2006    ENDOSCOPY N/A 12/7/2017    Procedure: ESOPHAGOGASTRODUODENOSCOPY WITH ANESTHESIA;  Surgeon: Eliseo Moon MD;  Location: [facility] OR;  Service:     ENDOSCOPY N/A 2/21/2018    Procedure: ESOPHAGOGASTRODUODENOSCOPY WITH DILATATION CPT CODE: 98633;  Surgeon: Tomi Dey III, MD;  Location: [facility] OR;  Service:     EYE SURGERY      HYSTERECTOMY      NERVE REPAIR      TONSILLECTOMY         Family History   Problem Relation Age of Onset    Heart attack Mother     Stroke Mother      Coronary artery disease Mother     Diabetes Mother     Hypertension Mother     Heart disease Father     Arthritis Father     Coronary artery disease Father     Liver disease Brother        Social History     Socioeconomic History    Marital status:    Tobacco Use    Smoking status: Never    Smokeless tobacco: Never   Substance and Sexual Activity    Alcohol use: No    Drug use: No    Sexual activity: Defer           Objective   Physical Exam  Vitals and nursing note reviewed.   Constitutional:       General: She is not in acute distress.     Appearance: She is well-developed. She is not diaphoretic.   HENT:      Head: Normocephalic and atraumatic.      Right Ear: External ear normal.      Left Ear: External ear normal.      Nose: Nose normal.   Eyes:      Conjunctiva/sclera: Conjunctivae normal.      Pupils: Pupils are equal, round, and reactive to light.   Neck:      Vascular: No JVD.      Trachea: No tracheal deviation.   Cardiovascular:      Rate and Rhythm: Normal rate and regular rhythm.      Heart sounds: Normal heart sounds. No murmur heard.  Pulmonary:      Effort: Pulmonary effort is normal. No respiratory distress.      Breath sounds: Normal breath sounds. No wheezing.   Abdominal:      General: Bowel sounds are normal.      Palpations: Abdomen is soft.      Tenderness: There is no abdominal tenderness.   Musculoskeletal:         General: No deformity. Normal range of motion.      Cervical back: Normal range of motion and neck supple.   Skin:     General: Skin is warm and dry.      Coloration: Skin is not pale.      Findings: No erythema or rash.   Neurological:      Mental Status: She is alert and oriented to person, place, and time.      Cranial Nerves: No cranial nerve deficit.   Psychiatric:         Behavior: Behavior normal.         Thought Content: Thought content normal.       Results for orders placed or performed during the hospital encounter of 05/02/25   Comprehensive Metabolic Panel     Collection Time: 05/02/25  4:33 PM    Specimen: Blood   Result Value Ref Range    Glucose 160 (H) 65 - 99 mg/dL    BUN 11 8 - 23 mg/dL    Creatinine 0.96 0.57 - 1.00 mg/dL    Sodium 135 (L) 136 - 145 mmol/L    Potassium 4.3 3.5 - 5.2 mmol/L    Chloride 97 (L) 98 - 107 mmol/L    CO2 21.7 (L) 22.0 - 29.0 mmol/L    Calcium 9.7 8.6 - 10.5 mg/dL    Total Protein 7.6 6.0 - 8.5 g/dL    Albumin 4.2 3.5 - 5.2 g/dL    ALT (SGPT) 14 1 - 33 U/L    AST (SGOT) 20 1 - 32 U/L    Alkaline Phosphatase 120 (H) 39 - 117 U/L    Total Bilirubin 1.1 0.0 - 1.2 mg/dL    Globulin 3.4 gm/dL    A/G Ratio 1.2 g/dL    BUN/Creatinine Ratio 11.5 7.0 - 25.0    Anion Gap 16.3 (H) 5.0 - 15.0 mmol/L    eGFR 60.7 >60.0 mL/min/1.73   CBC Auto Differential    Collection Time: 05/02/25  4:33 PM    Specimen: Blood   Result Value Ref Range    WBC 11.72 (H) 3.40 - 10.80 10*3/mm3    RBC 4.63 3.77 - 5.28 10*6/mm3    Hemoglobin 13.7 12.0 - 15.9 g/dL    Hematocrit 44.1 34.0 - 46.6 %    MCV 95.2 79.0 - 97.0 fL    MCH 29.6 26.6 - 33.0 pg    MCHC 31.1 (L) 31.5 - 35.7 g/dL    RDW 13.3 12.3 - 15.4 %    RDW-SD 46.6 37.0 - 54.0 fl    MPV 8.8 6.0 - 12.0 fL    Platelets 286 140 - 450 10*3/mm3    Neutrophil % 72.6 42.7 - 76.0 %    Lymphocyte % 18.2 (L) 19.6 - 45.3 %    Monocyte % 4.4 (L) 5.0 - 12.0 %    Eosinophil % 3.6 0.3 - 6.2 %    Basophil % 0.9 0.0 - 1.5 %    Immature Grans % 0.3 0.0 - 0.5 %    Neutrophils, Absolute 8.53 (H) 1.70 - 7.00 10*3/mm3    Lymphocytes, Absolute 2.13 0.70 - 3.10 10*3/mm3    Monocytes, Absolute 0.51 0.10 - 0.90 10*3/mm3    Eosinophils, Absolute 0.42 (H) 0.00 - 0.40 10*3/mm3    Basophils, Absolute 0.10 0.00 - 0.20 10*3/mm3    Immature Grans, Absolute 0.03 0.00 - 0.05 10*3/mm3    nRBC 0.0 0.0 - 0.2 /100 WBC   Green Top (Gel)    Collection Time: 05/02/25  4:33 PM   Result Value Ref Range    Extra Tube Hold for add-ons.    Lavender Top    Collection Time: 05/02/25  4:33 PM   Result Value Ref Range    Extra Tube hold for add-on    Gold Top - SST     Collection Time: 05/02/25  4:33 PM   Result Value Ref Range    Extra Tube Hold for add-ons.    Light Blue Top    Collection Time: 05/02/25  4:33 PM   Result Value Ref Range    Extra Tube Hold for add-ons.    ECG 12 Lead Tachycardia    Collection Time: 05/02/25  5:57 PM   Result Value Ref Range    QT Interval 390 ms    QTC Interval 477 ms     CT Lower Extremity Right Without Contrast  Result Date: 5/2/2025  Lateral tibial plateau fracture with depression as described.  This report was finalized on 5/2/2025 8:05 PM by YANNA UMANZOR MD.      XR Femur 2 View Right  Result Date: 5/2/2025  Impression:  No acute bony process of the femur.  This report was finalized on 5/2/2025 5:49 PM by Paco Soliman MD.      XR Tibia Fibula 2 View Right  Result Date: 5/2/2025  Impression:  Tibial plateau fracture with possible fibula shaft fracture.  This report was finalized on 5/2/2025 5:50 PM by Paco Soliman MD.      XR Ankle 3+ View Right  Result Date: 5/2/2025  Impression:  No acute bony process.  This report was finalized on 5/2/2025 5:50 PM by Paco Soliman MD.      XR Shoulder 2+ View Right  Result Date: 5/2/2025  Impression:  No acute bony process.  This report was finalized on 5/2/2025 5:47 PM by Paco Soliman MD.      XR Chest 1 View  Result Date: 5/2/2025   Bilateral lung reticulation which is new. Atypical infection versus fibrosis.  This report was finalized on 5/2/2025 5:46 PM by Paco Soliman MD.          Procedures           ED Course  ED Course as of 05/02/25 2306   Fri May 02, 2025   2207 Spoke with Dr. Guevara who states the patient can wear a knee immobilizer and follow-up outpatient, however, she lives alone and has no family close by.  Patient states she is unable to care for herself at home without risk of falling.  Will discuss with hospitalist to see if the patient would meet inpatient criteria with potential to be discharged to a rehab facility.   [BD]   2305 With hospitalist admitting  physician, Dr. Lee, who is agreeable to admit the patient for observation [BD]      ED Course User Index  [BD] Starla Alonso PA-C                                             Electronically signed by SRINIVASA Thomas, 05/02/25, 8:05 PM EDT.            Medical Decision Making  79-year-old female presents secondary to injury to her right lower extremity and right shoulder.  Patient was getting into her car.  She states her leg gave out.  She slammed between the door and the car.  She denies loss of consciousness.  Patient has been feeling fatigued recently.  No loss of consciousness.  Patient has a past medical history of paroxysmal A-fib, depression, anxiety, arthritis.  Patient presented by private vehicle.     Amount and/or Complexity of Data Reviewed  Labs: ordered. Decision-making details documented in ED Course.  Radiology: ordered. Decision-making details documented in ED Course.  ECG/medicine tests: ordered.    Risk  Prescription drug management.  Decision regarding hospitalization.        Final diagnoses:   None       ED Disposition  ED Disposition       ED Disposition   Decision to Admit    Condition   --    Comment   Level of Care: Medical Telemetry [23]   Diagnosis: Tibial plateau fracture, right [[id number]]   Admitting Physician: JOCELYNE LEE [[id number]]                 No follow-up provider specified.       Medication List      No changes were made to your prescriptions during this visit.            Starla Alonso PA-C  05/02/25 0842     1.005 - 1.030    Glucose, UA Negative Negative    Ketones, UA Trace (A) Negative    Bilirubin, UA Negative Negative    Blood, UA Negative Negative    Protein, UA Negative Negative    Leuk Esterase, UA Negative Negative    Nitrite, UA Negative Negative    Urobilinogen, UA 1.0 E.U./dL 0.2 - 1.0 E.U./dL   Urinalysis With Culture If Indicated - Straight Cath    Collection Time: 05/03/25  5:50 AM    Specimen: Straight Cath; Urine   Result Value Ref Range    Color, UA Yellow Yellow, Straw    Appearance, UA Clear Clear    pH, UA 5.5 5.0 - 8.0    Specific Gravity, UA 1.013 1.005 - 1.030    Glucose, UA Negative Negative    Ketones, UA Trace (A) Negative    Bilirubin, UA Negative Negative    Blood, UA Negative Negative    Protein, UA Negative Negative    Leuk Esterase, UA Negative Negative    Nitrite, UA Negative Negative    Urobilinogen, UA 1.0 E.U./dL 0.2 - 1.0 E.U./dL   Fentanyl, Urine - Straight Cath    Collection Time: 05/03/25  5:50 AM    Specimen: Straight Cath; Urine   Result Value Ref Range    Fentanyl, Urine Negative Negative   ECG 12 Lead Drug Monitoring; Betapace    Collection Time: 05/03/25  2:11 PM   Result Value Ref Range    QT Interval 398 ms    QTC Interval 494 ms   ECG 12 Lead QT Measurement    Collection Time: 05/03/25 10:32 PM   Result Value Ref Range    QT Interval 374 ms    QTC Interval 484 ms   POC Glucose Once    Collection Time: 05/04/25  7:48 PM    Specimen: Blood   Result Value Ref Range    Glucose 128 70 - 130 mg/dL   ECG 12 Lead Drug Monitoring    Collection Time: 05/04/25 10:44 PM   Result Value Ref Range    QT Interval 390 ms    QTC Interval 497 ms   CBC (No Diff)    Collection Time: 05/05/25 12:15 AM    Specimen: Blood   Result Value Ref Range    WBC 9.71 3.40 - 10.80 10*3/mm3    RBC 3.37 (L) 3.77 - 5.28 10*6/mm3    Hemoglobin 10.3 (L) 12.0 - 15.9 g/dL    Hematocrit 32.8 (L) 34.0 - 46.6 %    MCV 97.3 (H) 79.0 - 97.0 fL    MCH 30.6 26.6 - 33.0 pg    MCHC 31.4 (L) 31.5 - 35.7 g/dL    RDW 13.7  12.3 - 15.4 %    RDW-SD 48.6 37.0 - 54.0 fl    MPV 9.1 6.0 - 12.0 fL    Platelets 202 140 - 450 10*3/mm3   Basic Metabolic Panel    Collection Time: 05/05/25 12:15 AM    Specimen: Blood   Result Value Ref Range    Glucose 144 (H) 65 - 99 mg/dL    BUN 8 8 - 23 mg/dL    Creatinine 0.68 0.57 - 1.00 mg/dL    Sodium 138 136 - 145 mmol/L    Potassium 3.6 3.5 - 5.2 mmol/L    Chloride 106 98 - 107 mmol/L    CO2 20.6 (L) 22.0 - 29.0 mmol/L    Calcium 8.1 (L) 8.6 - 10.5 mg/dL    BUN/Creatinine Ratio 11.8 7.0 - 25.0    Anion Gap 11.4 5.0 - 15.0 mmol/L    eGFR 89.3 >60.0 mL/min/1.73   Potassium    Collection Time: 05/05/25 10:38 AM    Specimen: Blood   Result Value Ref Range    Potassium 4.3 3.5 - 5.2 mmol/L   POC Glucose Once    Collection Time: 05/06/25  6:58 AM    Specimen: Blood   Result Value Ref Range    Glucose 121 70 - 130 mg/dL   Comprehensive Metabolic Panel    Collection Time: 05/07/25  1:18 AM    Specimen: Blood   Result Value Ref Range    Glucose 127 (H) 65 - 99 mg/dL    BUN 9 8 - 23 mg/dL    Creatinine 0.79 0.57 - 1.00 mg/dL    Sodium 134 (L) 136 - 145 mmol/L    Potassium 5.2 3.5 - 5.2 mmol/L    Chloride 96 (L) 98 - 107 mmol/L    CO2 25.6 22.0 - 29.0 mmol/L    Calcium 8.9 8.6 - 10.5 mg/dL    Total Protein 6.4 6.0 - 8.5 g/dL    Albumin 3.0 (L) 3.5 - 5.2 g/dL    ALT (SGPT) 23 1 - 33 U/L    AST (SGOT) 45 (H) 1 - 32 U/L    Alkaline Phosphatase 135 (H) 39 - 117 U/L    Total Bilirubin 1.0 0.0 - 1.2 mg/dL    Globulin 3.4 gm/dL    A/G Ratio 0.9 g/dL    BUN/Creatinine Ratio 11.4 7.0 - 25.0    Anion Gap 12.4 5.0 - 15.0 mmol/L    eGFR 76.7 >60.0 mL/min/1.73   CBC Auto Differential    Collection Time: 05/07/25  1:18 AM    Specimen: Blood   Result Value Ref Range    WBC 12.19 (H) 3.40 - 10.80 10*3/mm3    RBC 3.75 (L) 3.77 - 5.28 10*6/mm3    Hemoglobin 11.3 (L) 12.0 - 15.9 g/dL    Hematocrit 36.2 34.0 - 46.6 %    MCV 96.5 79.0 - 97.0 fL    MCH 30.1 26.6 - 33.0 pg    MCHC 31.2 (L) 31.5 - 35.7 g/dL    RDW 13.7 12.3 - 15.4 %     RDW-SD 48.8 37.0 - 54.0 fl    MPV 9.6 6.0 - 12.0 fL    Platelets 309 140 - 450 10*3/mm3    Neutrophil % 76.9 (H) 42.7 - 76.0 %    Lymphocyte % 15.6 (L) 19.6 - 45.3 %    Monocyte % 5.2 5.0 - 12.0 %    Eosinophil % 1.4 0.3 - 6.2 %    Basophil % 0.5 0.0 - 1.5 %    Immature Grans % 0.4 0.0 - 0.5 %    Neutrophils, Absolute 9.38 (H) 1.70 - 7.00 10*3/mm3    Lymphocytes, Absolute 1.90 0.70 - 3.10 10*3/mm3    Monocytes, Absolute 0.63 0.10 - 0.90 10*3/mm3    Eosinophils, Absolute 0.17 0.00 - 0.40 10*3/mm3    Basophils, Absolute 0.06 0.00 - 0.20 10*3/mm3    Immature Grans, Absolute 0.05 0.00 - 0.05 10*3/mm3    nRBC 0.0 0.0 - 0.2 /100 WBC   POC Glucose Once    Collection Time: 05/07/25  6:14 AM    Specimen: Blood   Result Value Ref Range    Glucose 111 70 - 130 mg/dL   ECG 12 Lead Tachycardia    Collection Time: 05/07/25  6:40 PM   Result Value Ref Range    QT Interval 368 ms    QTC Interval 504 ms   ECG 12 Lead Rhythm Change    Collection Time: 05/07/25  9:49 PM   Result Value Ref Range    QT Interval 400 ms    QTC Interval 440 ms   ECG 12 Lead Rhythm Change    Collection Time: 05/08/25 12:47 AM   Result Value Ref Range    QT Interval 366 ms    QTC Interval 467 ms   Comprehensive Metabolic Panel    Collection Time: 05/08/25  1:27 AM    Specimen: Blood   Result Value Ref Range    Glucose 134 (H) 65 - 99 mg/dL    BUN 10 8 - 23 mg/dL    Creatinine 0.75 0.57 - 1.00 mg/dL    Sodium 128 (L) 136 - 145 mmol/L    Potassium 4.0 3.5 - 5.2 mmol/L    Chloride 92 (L) 98 - 107 mmol/L    CO2 24.0 22.0 - 29.0 mmol/L    Calcium 8.6 8.6 - 10.5 mg/dL    Total Protein 5.8 (L) 6.0 - 8.5 g/dL    Albumin 2.7 (L) 3.5 - 5.2 g/dL    ALT (SGPT) 13 1 - 33 U/L    AST (SGOT) 33 (H) 1 - 32 U/L    Alkaline Phosphatase 152 (H) 39 - 117 U/L    Total Bilirubin 0.9 0.0 - 1.2 mg/dL    Globulin 3.1 gm/dL    A/G Ratio 0.9 g/dL    BUN/Creatinine Ratio 13.3 7.0 - 25.0    Anion Gap 12.0 5.0 - 15.0 mmol/L    eGFR 81.6 >60.0 mL/min/1.73   CBC Auto Differential     Collection Time: 05/08/25  1:27 AM    Specimen: Blood   Result Value Ref Range    WBC 9.08 3.40 - 10.80 10*3/mm3    RBC 3.38 (L) 3.77 - 5.28 10*6/mm3    Hemoglobin 10.2 (L) 12.0 - 15.9 g/dL    Hematocrit 31.8 (L) 34.0 - 46.6 %    MCV 94.1 79.0 - 97.0 fL    MCH 30.2 26.6 - 33.0 pg    MCHC 32.1 31.5 - 35.7 g/dL    RDW 13.3 12.3 - 15.4 %    RDW-SD 46.1 37.0 - 54.0 fl    MPV 9.4 6.0 - 12.0 fL    Platelets 276 140 - 450 10*3/mm3    Neutrophil % 71.1 42.7 - 76.0 %    Lymphocyte % 17.6 (L) 19.6 - 45.3 %    Monocyte % 6.3 5.0 - 12.0 %    Eosinophil % 3.9 0.3 - 6.2 %    Basophil % 0.7 0.0 - 1.5 %    Immature Grans % 0.4 0.0 - 0.5 %    Neutrophils, Absolute 6.46 1.70 - 7.00 10*3/mm3    Lymphocytes, Absolute 1.60 0.70 - 3.10 10*3/mm3    Monocytes, Absolute 0.57 0.10 - 0.90 10*3/mm3    Eosinophils, Absolute 0.35 0.00 - 0.40 10*3/mm3    Basophils, Absolute 0.06 0.00 - 0.20 10*3/mm3    Immature Grans, Absolute 0.04 0.00 - 0.05 10*3/mm3    nRBC 0.0 0.0 - 0.2 /100 WBC   Comprehensive Metabolic Panel    Collection Time: 05/09/25  3:38 AM    Specimen: Blood   Result Value Ref Range    Glucose 116 (H) 65 - 99 mg/dL    BUN 9 8 - 23 mg/dL    Creatinine 0.71 0.57 - 1.00 mg/dL    Sodium 134 (L) 136 - 145 mmol/L    Potassium 3.6 3.5 - 5.2 mmol/L    Chloride 99 98 - 107 mmol/L    CO2 24.6 22.0 - 29.0 mmol/L    Calcium 8.2 (L) 8.6 - 10.5 mg/dL    Total Protein 5.5 (L) 6.0 - 8.5 g/dL    Albumin 2.7 (L) 3.5 - 5.2 g/dL    ALT (SGPT) 8 1 - 33 U/L    AST (SGOT) 27 1 - 32 U/L    Alkaline Phosphatase 153 (H) 39 - 117 U/L    Total Bilirubin 0.6 0.0 - 1.2 mg/dL    Globulin 2.8 gm/dL    A/G Ratio 1.0 g/dL    BUN/Creatinine Ratio 12.7 7.0 - 25.0    Anion Gap 10.4 5.0 - 15.0 mmol/L    eGFR 87.2 >60.0 mL/min/1.73   CBC Auto Differential    Collection Time: 05/09/25  3:38 AM    Specimen: Blood   Result Value Ref Range    WBC 8.23 3.40 - 10.80 10*3/mm3    RBC 2.97 (L) 3.77 - 5.28 10*6/mm3    Hemoglobin 8.9 (L) 12.0 - 15.9 g/dL    Hematocrit 28.2 (L) 34.0 -  46.6 %    MCV 94.9 79.0 - 97.0 fL    MCH 30.0 26.6 - 33.0 pg    MCHC 31.6 31.5 - 35.7 g/dL    RDW 13.3 12.3 - 15.4 %    RDW-SD 45.8 37.0 - 54.0 fl    MPV 9.1 6.0 - 12.0 fL    Platelets 269 140 - 450 10*3/mm3    Neutrophil % 70.8 42.7 - 76.0 %    Lymphocyte % 19.1 (L) 19.6 - 45.3 %    Monocyte % 5.8 5.0 - 12.0 %    Eosinophil % 3.4 0.3 - 6.2 %    Basophil % 0.5 0.0 - 1.5 %    Immature Grans % 0.4 0.0 - 0.5 %    Neutrophils, Absolute 5.83 1.70 - 7.00 10*3/mm3    Lymphocytes, Absolute 1.57 0.70 - 3.10 10*3/mm3    Monocytes, Absolute 0.48 0.10 - 0.90 10*3/mm3    Eosinophils, Absolute 0.28 0.00 - 0.40 10*3/mm3    Basophils, Absolute 0.04 0.00 - 0.20 10*3/mm3    Immature Grans, Absolute 0.03 0.00 - 0.05 10*3/mm3    nRBC 0.0 0.0 - 0.2 /100 WBC   Potassium    Collection Time: 05/09/25  2:10 PM    Specimen: Blood   Result Value Ref Range    Potassium 4.4 3.5 - 5.2 mmol/L               Procedures           ED Course  ED Course as of 05/16/25 1401   Fri May 02, 2025   2207 Spoke with Dr. Guevara who states the patient can wear a knee immobilizer and follow-up outpatient, however, she lives alone and has no family close by.  Patient states she is unable to care for herself at home without risk of falling.  Will discuss with hospitalist to see if the patient would meet inpatient criteria with potential to be discharged to a rehab facility.   [BD]   2305 With hospitalist admitting physician, Dr. Wren, who is agreeable to admit the patient for observation [BD]      ED Course User Index  [BD] Starla Alonso PA-C                                             Electronically signed by SRINIVASA Thomas, 05/02/25, 8:05 PM EDT.            Medical Decision Making  79-year-old female presents secondary to injury to her right lower extremity and right shoulder.  Patient was getting into her car.  She states her leg gave out.  She slammed between the door and the car.  She denies loss of consciousness.  Patient has been feeling  fatigued recently.  No loss of consciousness.  Patient has a past medical history of paroxysmal A-fib, depression, anxiety, arthritis.  Patient presented by private vehicle.     Amount and/or Complexity of Data Reviewed  Labs: ordered. Decision-making details documented in ED Course.  Radiology: ordered. Decision-making details documented in ED Course.  ECG/medicine tests: ordered.    Risk  Prescription drug management.  Decision regarding hospitalization.        Final diagnoses:   Closed fracture of right tibial plateau, initial encounter   Anxiety       ED Disposition  ED Disposition       ED Disposition   Decision to Admit    Condition   --    Comment   Level of Care: Medical Telemetry [23]   Diagnosis: Tibial plateau fracture, right [[id number]]   Admitting Physician: JOCELYNE LEE [[id number]]                 Bon Harrell DO  [address] [place] [address] [phone]    Follow up in 2 week(s)      [address] [address]  [phone]        Bi Youssef MD  [address] [place], [place] [id number] [phone]      Follow up with PCP at [facility] within 1 week.    Bi Youssef MD  [address] [place] [address]  [phone]               Medication List        New Prescriptions      methocarbamol 500 MG tablet  Commonly known as: ROBAXIN  Take 1 tablet by mouth Every 8 (Eight) Hours As Needed for Muscle Spasms.     oxyCODONE 5 MG immediate release tablet  Commonly known as: ROXICODONE  Take 1 tablet by mouth Every 8 (Eight) Hours As Needed for Moderate Pain.     polyethylene glycol 17 g packet  Commonly known as: MIRALAX  Take 17 g by mouth Daily.     sennosides-docusate 8.6-50 MG per tablet  Commonly known as: PERICOLACE  Take 2 tablets by mouth 2 (Two) Times a Day.            Changed      diazePAM 5 MG tablet  Commonly known as: VALIUM  Take 1 tablet by mouth Every Night.  What changed: when to take this            Stop      furosemide 20 MG  tablet  Commonly known as: LASIX               Where to Get Your Medications        These medications were sent to [facility] - [place], [place] - [address] [address] - [phone] [facility] [phone]   [address] [facility] [address]      Phone: [phone]   diazePAM 5 MG tablet  oxyCODONE 5 MG immediate release tablet       Information about where to get these medications is not yet available    Ask your nurse or doctor about these medications  methocarbamol 500 MG tablet  polyethylene glycol 17 g packet  sennosides-docusate 8.6-50 MG per tablet            Starla Alonso PA-C  05/02/25 6148       Paul Holman PA  05/16/25 7748

## 2025-05-03 LAB
25(OH)D3 SERPL-MCNC: 31.8 NG/ML (ref 30–100)
ALBUMIN SERPL-MCNC: 3.5 G/DL (ref 3.5–5.2)
ALBUMIN/GLOB SERPL: 1.1 G/DL
ALP SERPL-CCNC: 109 U/L (ref 39–117)
ALT SERPL W P-5'-P-CCNC: 16 U/L (ref 1–33)
AMPHET+METHAMPHET UR QL: NEGATIVE
AMPHETAMINES UR QL: NEGATIVE
ANION GAP SERPL CALCULATED.3IONS-SCNC: 14 MMOL/L (ref 5–15)
AST SERPL-CCNC: 33 U/L (ref 1–32)
BARBITURATES UR QL SCN: NEGATIVE
BASOPHILS # BLD AUTO: 0.07 10*3/MM3 (ref 0–0.2)
BASOPHILS NFR BLD AUTO: 0.5 % (ref 0–1.5)
BENZODIAZ UR QL SCN: POSITIVE
BILIRUB SERPL-MCNC: 1.4 MG/DL (ref 0–1.2)
BILIRUB UR QL STRIP: NEGATIVE
BILIRUB UR QL STRIP: NEGATIVE
BUN SERPL-MCNC: 11 MG/DL (ref 8–23)
BUN/CREAT SERPL: 12.4 (ref 7–25)
BUPRENORPHINE SERPL-MCNC: NEGATIVE NG/ML
CALCIUM SPEC-SCNC: 9.1 MG/DL (ref 8.6–10.5)
CANNABINOIDS SERPL QL: NEGATIVE
CHLORIDE SERPL-SCNC: 96 MMOL/L (ref 98–107)
CLARITY UR: CLEAR
CLARITY UR: CLEAR
CO2 SERPL-SCNC: 21 MMOL/L (ref 22–29)
COCAINE UR QL: NEGATIVE
COLOR UR: YELLOW
COLOR UR: YELLOW
CREAT SERPL-MCNC: 0.89 MG/DL (ref 0.57–1)
DEPRECATED RDW RBC AUTO: 45.7 FL (ref 37–54)
EGFRCR SERPLBLD CKD-EPI 2021: 66.5 ML/MIN/1.73
EOSINOPHIL # BLD AUTO: 0.17 10*3/MM3 (ref 0–0.4)
EOSINOPHIL NFR BLD AUTO: 1.3 % (ref 0.3–6.2)
ERYTHROCYTE [DISTWIDTH] IN BLOOD BY AUTOMATED COUNT: 13.2 % (ref 12.3–15.4)
FENTANYL UR-MCNC: NEGATIVE NG/ML
GLOBULIN UR ELPH-MCNC: 3.2 GM/DL
GLUCOSE SERPL-MCNC: 138 MG/DL (ref 65–99)
GLUCOSE UR STRIP-MCNC: NEGATIVE MG/DL
GLUCOSE UR STRIP-MCNC: NEGATIVE MG/DL
HCT VFR BLD AUTO: 38 % (ref 34–46.6)
HGB BLD-MCNC: 12.1 G/DL (ref 12–15.9)
HGB UR QL STRIP.AUTO: NEGATIVE
HGB UR QL STRIP.AUTO: NEGATIVE
IMM GRANULOCYTES # BLD AUTO: 0.06 10*3/MM3 (ref 0–0.05)
IMM GRANULOCYTES NFR BLD AUTO: 0.4 % (ref 0–0.5)
KETONES UR QL STRIP: ABNORMAL
KETONES UR QL STRIP: ABNORMAL
LEUKOCYTE ESTERASE UR QL STRIP.AUTO: NEGATIVE
LEUKOCYTE ESTERASE UR QL STRIP.AUTO: NEGATIVE
LYMPHOCYTES # BLD AUTO: 1.69 10*3/MM3 (ref 0.7–3.1)
LYMPHOCYTES NFR BLD AUTO: 12.6 % (ref 19.6–45.3)
MAGNESIUM SERPL-MCNC: 2 MG/DL (ref 1.6–2.4)
MCH RBC QN AUTO: 30.2 PG (ref 26.6–33)
MCHC RBC AUTO-ENTMCNC: 31.8 G/DL (ref 31.5–35.7)
MCV RBC AUTO: 94.8 FL (ref 79–97)
METHADONE UR QL SCN: NEGATIVE
MONOCYTES # BLD AUTO: 0.71 10*3/MM3 (ref 0.1–0.9)
MONOCYTES NFR BLD AUTO: 5.3 % (ref 5–12)
NEUTROPHILS NFR BLD AUTO: 10.71 10*3/MM3 (ref 1.7–7)
NEUTROPHILS NFR BLD AUTO: 79.9 % (ref 42.7–76)
NITRITE UR QL STRIP: NEGATIVE
NITRITE UR QL STRIP: NEGATIVE
NRBC BLD AUTO-RTO: 0 /100 WBC (ref 0–0.2)
OPIATES UR QL: POSITIVE
OXYCODONE UR QL SCN: NEGATIVE
PCP UR QL SCN: NEGATIVE
PH UR STRIP.AUTO: 5.5 [PH] (ref 5–8)
PH UR STRIP.AUTO: 5.5 [PH] (ref 5–8)
PLATELET # BLD AUTO: 225 10*3/MM3 (ref 140–450)
PMV BLD AUTO: 9.4 FL (ref 6–12)
POTASSIUM SERPL-SCNC: 5 MMOL/L (ref 3.5–5.2)
PROCALCITONIN SERPL-MCNC: 0.04 NG/ML (ref 0–0.25)
PROT SERPL-MCNC: 6.7 G/DL (ref 6–8.5)
PROT UR QL STRIP: NEGATIVE
PROT UR QL STRIP: NEGATIVE
QT INTERVAL: 398 MS
QTC INTERVAL: 494 MS
RBC # BLD AUTO: 4.01 10*6/MM3 (ref 3.77–5.28)
SODIUM SERPL-SCNC: 131 MMOL/L (ref 136–145)
SP GR UR STRIP: 1.01 (ref 1–1.03)
SP GR UR STRIP: 1.01 (ref 1–1.03)
TRICYCLICS UR QL SCN: NEGATIVE
TSH SERPL DL<=0.05 MIU/L-ACNC: 4.76 UIU/ML (ref 0.27–4.2)
UROBILINOGEN UR QL STRIP: ABNORMAL
UROBILINOGEN UR QL STRIP: ABNORMAL
WBC NRBC COR # BLD AUTO: 13.41 10*3/MM3 (ref 3.4–10.8)

## 2025-05-03 PROCEDURE — 92610 EVALUATE SWALLOWING FUNCTION: CPT

## 2025-05-03 PROCEDURE — 93005 ELECTROCARDIOGRAM TRACING: CPT | Performed by: INTERNAL MEDICINE

## 2025-05-03 PROCEDURE — 93010 ELECTROCARDIOGRAM REPORT: CPT | Performed by: INTERNAL MEDICINE

## 2025-05-03 PROCEDURE — 82306 VITAMIN D 25 HYDROXY: CPT | Performed by: INTERNAL MEDICINE

## 2025-05-03 PROCEDURE — G0378 HOSPITAL OBSERVATION PER HR: HCPCS

## 2025-05-03 PROCEDURE — 99232 SBSQ HOSP IP/OBS MODERATE 35: CPT | Performed by: INTERNAL MEDICINE

## 2025-05-03 PROCEDURE — 25810000003 LACTATED RINGERS PER 1000 ML: Performed by: INTERNAL MEDICINE

## 2025-05-03 PROCEDURE — 80053 COMPREHEN METABOLIC PANEL: CPT | Performed by: INTERNAL MEDICINE

## 2025-05-03 PROCEDURE — 81003 URINALYSIS AUTO W/O SCOPE: CPT | Performed by: INTERNAL MEDICINE

## 2025-05-03 PROCEDURE — 80307 DRUG TEST PRSMV CHEM ANLYZR: CPT | Performed by: INTERNAL MEDICINE

## 2025-05-03 PROCEDURE — 85025 COMPLETE CBC W/AUTO DIFF WBC: CPT | Performed by: INTERNAL MEDICINE

## 2025-05-03 PROCEDURE — 25810000003 SODIUM CHLORIDE 0.9 % SOLUTION: Performed by: INTERNAL MEDICINE

## 2025-05-03 RX ORDER — SOTALOL HYDROCHLORIDE 80 MG/1
80 TABLET ORAL 2 TIMES DAILY
Status: DISCONTINUED | OUTPATIENT
Start: 2025-05-03 | End: 2025-05-09 | Stop reason: HOSPADM

## 2025-05-03 RX ORDER — FUROSEMIDE 20 MG/1
20 TABLET ORAL DAILY
Status: DISCONTINUED | OUTPATIENT
Start: 2025-05-03 | End: 2025-05-09 | Stop reason: HOSPADM

## 2025-05-03 RX ORDER — PANTOPRAZOLE SODIUM 40 MG/1
40 TABLET, DELAYED RELEASE ORAL
Status: DISCONTINUED | OUTPATIENT
Start: 2025-05-04 | End: 2025-05-09 | Stop reason: HOSPADM

## 2025-05-03 RX ORDER — SODIUM CHLORIDE 9 MG/ML
100 INJECTION, SOLUTION INTRAVENOUS CONTINUOUS
Status: DISCONTINUED | OUTPATIENT
Start: 2025-05-03 | End: 2025-05-05

## 2025-05-03 RX ORDER — OXYCODONE AND ACETAMINOPHEN 5; 325 MG/1; MG/1
1 TABLET ORAL EVERY 8 HOURS PRN
Refills: 0 | Status: DISCONTINUED | OUTPATIENT
Start: 2025-05-03 | End: 2025-05-03

## 2025-05-03 RX ORDER — OXYCODONE HYDROCHLORIDE 5 MG/1
5 TABLET ORAL EVERY 8 HOURS PRN
Refills: 0 | Status: DISCONTINUED | OUTPATIENT
Start: 2025-05-03 | End: 2025-05-09 | Stop reason: HOSPADM

## 2025-05-03 RX ORDER — DIAZEPAM 5 MG/1
5 TABLET ORAL DAILY
Status: DISCONTINUED | OUTPATIENT
Start: 2025-05-03 | End: 2025-05-05

## 2025-05-03 RX ADMIN — SODIUM CHLORIDE 100 ML/HR: 9 INJECTION, SOLUTION INTRAVENOUS at 05:44

## 2025-05-03 RX ADMIN — SODIUM CHLORIDE 100 ML/HR: 9 INJECTION, SOLUTION INTRAVENOUS at 18:02

## 2025-05-03 RX ADMIN — ACETAMINOPHEN 1000 MG: 500 TABLET ORAL at 00:39

## 2025-05-03 RX ADMIN — ACETAMINOPHEN 1000 MG: 500 TABLET ORAL at 08:38

## 2025-05-03 RX ADMIN — SOTALOL HYDROCHLORIDE 80 MG: 80 TABLET ORAL at 12:12

## 2025-05-03 RX ADMIN — SENNOSIDES AND DOCUSATE SODIUM 2 TABLET: 50; 8.6 TABLET ORAL at 21:26

## 2025-05-03 RX ADMIN — FUROSEMIDE 20 MG: 20 TABLET ORAL at 12:12

## 2025-05-03 RX ADMIN — SODIUM CHLORIDE, POTASSIUM CHLORIDE, SODIUM LACTATE AND CALCIUM CHLORIDE 75 ML/HR: 600; 310; 30; 20 INJECTION, SOLUTION INTRAVENOUS at 00:39

## 2025-05-03 RX ADMIN — SOTALOL HYDROCHLORIDE 80 MG: 80 TABLET ORAL at 21:26

## 2025-05-03 RX ADMIN — DIAZEPAM 5 MG: 5 TABLET ORAL at 12:12

## 2025-05-03 RX ADMIN — Medication 10 ML: at 21:26

## 2025-05-03 RX ADMIN — ACETAMINOPHEN 1000 MG: 500 TABLET ORAL at 15:16

## 2025-05-03 RX ADMIN — SENNOSIDES AND DOCUSATE SODIUM 2 TABLET: 50; 8.6 TABLET ORAL at 08:38

## 2025-05-03 RX ADMIN — OXYCODONE 5 MG: 5 TABLET ORAL at 21:24

## 2025-05-03 RX ADMIN — Medication 10 ML: at 00:40

## 2025-05-03 NOTE — THERAPY EVALUATION
"Acute Care - Speech Language Pathology   Swallow Initial Evaluation [facility]  CLINICAL DYSPHAGIA ASSESSMENT     Patient Name: Johanna Street  : 1945  MRN: [id number]  Today's Date: 5/3/2025             Admit Date: 2025    Johanna Street  was seen at bedside this AM on 3N to assess safety/efficacy of swallowing fnx, determine safest/least restrictive diet tolerance.     Ms. Street presented to [facility] ED 25 for evaluation of injury to right lower extremity. She reported to ED staff that her \"leg gave out\" while she was getting into her car, resulting in the car door slamming into her leg. In ED,  vital signs were temperature 98.2, pulse 106, respirations 16, blood pressure 140/87 sitting, SpO2 saturation 95% on room air. CMP with sodium 135, chloride 97, CO2 21.7, anion gap 16.3, glucose 160, alkaline phosphatase 120, otherwise unremarkable.  Pro-Sorin negative.  CBC with WBCs 11.72, MCHC 31.1, otherwise unremarkable.  X-ray of the right ankle unremarkable.  Chest x-ray noted bilateral lung reticulation.  Atypical infection versus fibrosis.  X-ray of the right femur unremarkable.  X-ray of the right shoulder unremarkable.  X-ray of the right tib-fib noted tibial plateau fracture with possible fibular shaft fracture.  CT of the right lower extremity without contrast noted lateral tibial plateau fracture with depression. Orthopedic consultation in ED w/ recommendation for knee immobilizer and follow-up outpatien tfor further management, however, Ms. Street expressed concerns of being able to care for herself indpendently at home. She has no family locally to assist. She was admitted for further evaluation, management.     PMH significant for atrial fibrillation, chronic anticoagulation with Eliquis, hypertension, history of TIA, depression, anxiety, history of paraesophageal hernia, history of dysphagia, GERD, arthritis, scoliosis, and chronic back pain.     She was referred to rule out dysphagia. No " previous SLP dysphagia notes appreciated in EMR review. She did report retrosternal discomfort intermittently, self monitor/control of meal size 2/2 well established paraesophageal hernia. She denied any oropharyngeal dysphagia.     Social History     Socioeconomic History    Marital status:    Tobacco Use    Smoking status: Never    Smokeless tobacco: Never   Vaping Use    Vaping status: Never Used   Substance and Sexual Activity    Alcohol use: No    Drug use: No    Sexual activity: Defer      Diet Orders (active) (From admission, onward)       Start     Ordered    05/02/25 2336  Diet: Cardiac; Healthy Heart (2-3 Na+); Fluid Consistency: Thin (IDDSI 0)  Diet Effective Now         05/02/25 2335                  Ms. Street was positioned upright and centered in bed to accept multiple po presentations of ice chips, solid cracker, puree, and thin liquids via spoon, cup, and straw.  She was able to self provide po trials.     Facial/oral structures were symmetrical upon observation. Lingual protrusion revealed no deviation. Oral mucosa were moist, pink, and clean. Secretions were clear, thin, and well controlled. OROM/DLYON was wfl to imitate oral postures. Gag is not assessed. Volitional cough was intact w/ adequate  intensity, clear in quality, non-productive. Voice was adequate in intensity, clear in quality w/ intelligible speech. She was a/a and interactive, orieneted, followed directives, participated in conversational exchanges.     Upon po presentations, adequate bolus anticipation and acceptance w/ good labial seal for bolus clearance via spoon bowl, cup rim stability and suction via straw. Bolus formation, manipulation and control were wfl w/ rotary mastication pattern. A-p transit was timely w/o significant oral residue appreciated. No overt s/s aspiration before the swallow.      Pharyngeal swallow was timely w/ adequate hyolaryngeal elevation per palpation. No overt s/s aspiration evidenced across  this evaluation. No silent aspiration suspected. Patient denied odynophagia.    Visit Dx:   No diagnosis found.  Patient Active Problem List   Diagnosis    PAF (paroxysmal atrial fibrillation) 12 2013. [id number] score is 2.    Ankle edema    Depression    Anxiety    Paraesophageal hernia    Gastroesophageal reflux disease    Dysphagia    Abnormal EKG, left anterior fascicular block    Chronic anticoagulation    Tibial plateau fracture, right     Past Medical History:   Diagnosis Date    Ankle edema     Anxiety     Arthritis     Atrial fibrillation     Back pain     Depression     Hernia, hiatal     PAF (paroxysmal atrial fibrillation)      Past Surgical History:   Procedure Laterality Date    APPENDECTOMY      CARDIAC CATHETERIZATION  12/2013    CHOLECYSTECTOMY      COLON SURGERY      COLONOSCOPY  2006    ENDOSCOPY N/A 12/7/2017    Procedure: ESOPHAGOGASTRODUODENOSCOPY WITH ANESTHESIA;  Surgeon: Eliseo Moon MD;  Location: [facility] OR;  Service:     ENDOSCOPY N/A 2/21/2018    Procedure: ESOPHAGOGASTRODUODENOSCOPY WITH DILATATION CPT CODE: 64132;  Surgeon: Tomi Dey III, MD;  Location: [facility] OR;  Service:     EYE SURGERY      HYSTERECTOMY      NERVE REPAIR      TONSILLECTOMY       EDUCATION  The patient has been educated in the following areas:   Dysphagia (Swallowing Impairment) Oral Care/Hydration.     Impression: Ms. Street presented w/ wfl oropharyngeal swallow w/o s/s aspiration.No s/s indicative of silent aspiration.  No odynophagia reported.      She is recommended to continue regular texture po diet, thin liquids. Medications whole in puree, crushed PRN pend size and coating. Single pill presentations. Upright and centered with universal aspiration precautions, UTE precautions, oral care protocol.     SLP Recommendation and Plan   1. Continue regular texture po diet, thin liquids.    2. Medications whole in puree/thins.   3. Upright and centered for all po intake w/ universal aspiration  precautions.   4. Strict UTE precautions 2/2 established paraesophageal hiatal hernia.   5. Oral care protocol.  No further formal SLP f/u warranted/recommended at this time.    D/w patient results and recommendations w/ verbal agreement.    Thank you for allowing me to participate in the care of your patient-   Laura Padilla M.S., CCC/SLP                                                                                                    Time Calculation:       Therapy Charges for Today       Code Description Service Date Service Provider Modifiers Qty    [id number] HC ST EVAL ORAL PHARYNG SWALLOW 4 5/3/2025 Laura Padilla, MS CCC-SLP GN 1                 aLura Padilla, MS CCC-SLP  5/3/2025

## 2025-05-03 NOTE — H&P
[facility]  History & Physical    Patient Identification:  Name:  Johanna Street  Age:  79 y.o.  Sex:  female  :  1945  MRN:  [id number]   Visit Number:  [id number]  Admit Date: 2025   Primary Care Physician:  Bi Youssef MD    Subjective     Chief complaint: RLE Pain    History of presenting illness:      Johanna Street is a 79 y.o. female with past medical history significant for atrial fibrillation, chronic anticoagulation with Eliquis, hypertension, history of TIA, depression, anxiety, history of paraesophageal hernia, history of dysphagia, GERD, arthritis, scoliosis, and chronic back pain. Patient presented to [facility] secondary to injury to her right lower extremity. Patient was getting into her car. She states her leg gave out. She slammed leg between the door and the car. She states that she never did actually fall onto the ground and did not hit her head. Also denies loss of consciousness. Denies associated or precipitating symptoms such as dizziness, chest pain, or shortness of breath. Patient does admit that she's been feeling fatigued recently. States that she has a difficult time using her right arm/right shoulder at baseline due to a remote injury. She states that she broke her right shoulder several years ago and never did have surgery to repair it. Patient states that she lives alone. ED workup positive for right tibial plateau fracture. ED provider discussed with Orthopedic Surgery (Dr. Harrell) who recommended a knee immobilizer and follow-up outpatient for further management. However, patient stated that she would be unable to take care of herself at home and was concerned that she would experience a fall. Family member at bedside stated that patient could not stay with her as she lives 2 counties away and was unable to provide the help that patient would need. Patient confirms that she does take Eliquis for  Afib. Last dose of Eliquis was on the morning of 5/2/2025. She states that she is compliant with all her home medications. Denies smoking, alcohol abuse, or illicit substance use. Discussed admission plans with patient. She voiced agreement and understanding with no further questions or concerns at this time.     Upon arrival to the ED, vital signs were temperature 98.2, pulse 106, respirations 16, blood pressure 140/87 sitting, SpO2 saturation 95% on room air. CMP with sodium 135, chloride 97, CO2 21.7, anion gap 16.3, glucose 160, alkaline phosphatase 120, otherwise unremarkable.  Pro-Sorin negative.  CBC with WBCs 11.72, MCHC 31.1, otherwise unremarkable.  X-ray of the right ankle unremarkable.  Chest x-ray noted bilateral lung reticulation.  Atypical infection versus fibrosis.  X-ray of the right femur unremarkable.  X-ray of the right shoulder unremarkable.  X-ray of the right tib-fib noted tibial plateau fracture with possible fibular shaft fracture.  CT of the right lower extremity without contrast noted lateral tibial plateau fracture with depression.    Known Emergency Department medications received prior to my evaluation included 5 mg oral Valium, 2 mg IV morphine, 80 mg oral sotalol. Emergency Department Room location at the time of my evaluation was 114. Discussed with admitting physician, Pia Larson DO.      ---------------------------------------------------------------------------------------------------------------------   Review of Systems   Constitutional:  Negative for fever.   HENT: Negative.     Respiratory: Negative.     Cardiovascular: Negative.  Negative for chest pain.   Gastrointestinal: Negative.  Negative for abdominal pain.   Endocrine: Negative.    Genitourinary: Negative.  Negative for dysuria.   Musculoskeletal:  Positive for arthralgias and gait problem.   Skin: Negative.    Neurological:  Positive for weakness (generalized).   Psychiatric/Behavioral: Negative.     All other  systems reviewed and are negative.    ---------------------------------------------------------------------------------------------------------------------   Past Medical History:   Diagnosis Date    Ankle edema     Anxiety     Arthritis     Atrial fibrillation     Back pain     Depression     Hernia, hiatal     PAF (paroxysmal atrial fibrillation)      Past Surgical History:   Procedure Laterality Date    APPENDECTOMY      CARDIAC CATHETERIZATION  12/2013    CHOLECYSTECTOMY      COLON SURGERY      COLONOSCOPY  2006    ENDOSCOPY N/A 12/7/2017    Procedure: ESOPHAGOGASTRODUODENOSCOPY WITH ANESTHESIA;  Surgeon: Eliseo Moon MD;  Location: [facility];  Service:     ENDOSCOPY N/A 2/21/2018    Procedure: ESOPHAGOGASTRODUODENOSCOPY WITH DILATATION CPT CODE: 76987;  Surgeon: Tomi Dey III, MD;  Location: [facility];  Service:     EYE SURGERY      HYSTERECTOMY      NERVE REPAIR      TONSILLECTOMY       Family History   Problem Relation Age of Onset    Heart attack Mother     Stroke Mother     Coronary artery disease Mother     Diabetes Mother     Hypertension Mother     Heart disease Father     Arthritis Father     Coronary artery disease Father     Liver disease Brother      Social History     Socioeconomic History    Marital status:    Tobacco Use    Smoking status: Never    Smokeless tobacco: Never   Vaping Use    Vaping status: Never Used   Substance and Sexual Activity    Alcohol use: No    Drug use: No    Sexual activity: Defer     ---------------------------------------------------------------------------------------------------------------------   Allergies:  Codeine, Diltiazem, Zinc, Levaquin [levofloxacin], and Penicillins  ---------------------------------------------------------------------------------------------------------------------   Home medications:    Medications below are reported home medications pulling from within the system; at this time, these medications have not been  reconciled unless otherwise specified and are in the verification process for further verifcation as current home medications.  Medications Prior to Admission   Medication Sig Dispense Refill Last Dose/Taking    apixaban (ELIQUIS) 5 MG tablet tablet Take 1 tablet by mouth Every 12 (Twelve) Hours. 180 tablet 3 5/2/2025 Morning    diazepam (VALIUM) 5 MG tablet Take 1 tablet by mouth Daily.   5/2/2025    furosemide (LASIX) 20 MG tablet Take 1 tablet by mouth Daily.   5/2/2025    lansoprazole (PREVACID) 15 MG capsule Take 1 capsule by mouth Daily.   5/2/2025    sotalol (BETAPACE) 80 MG tablet Take 1 tablet by mouth 2 (Two) Times a Day.   5/2/2025 Evening    divalproex (DEPAKOTE) 125 MG DR tablet Take 1 tablet by mouth Daily As Needed (Headache).   Unknown       Hospital Scheduled Meds:  acetaminophen, 1,000 mg, Oral, Q8H  senna-docusate sodium, 2 tablet, Oral, BID  sodium chloride, 10 mL, Intravenous, Q12H      lactated ringers, 75 mL/hr, Last Rate: 75 mL/hr (05/03/25 0039)        Current listed hospital scheduled medications may not yet reflect those currently placed in orders that are signed and held awaiting patient's arrival to floor.   ---------------------------------------------------------------------------------------------------------------------     Objective     Vital Signs:  Temp:  [98.2 °F (36.8 °C)] 98.2 °F (36.8 °C)  Heart Rate:  [] 105  Resp:  [16-18] 18  BP: ()/(60-97) 134/79      05/02/25  1545 05/03/25  0002   Weight: 78.5 kg (173 lb) 81.2 kg (179 lb 0.2 oz)     Body mass index is 33.82 kg/m².  ---------------------------------------------------------------------------------------------------------------------       Physical Exam  Vitals reviewed.   Constitutional:       General: She is awake. She is not in acute distress.     Appearance: She is ill-appearing (chronically). She is not diaphoretic.   HENT:      Head: Normocephalic and atraumatic.      Mouth/Throat:      Mouth: Mucous  membranes are dry.   Eyes:      Extraocular Movements: Extraocular movements intact.      Pupils: Pupils are equal, round, and reactive to light.   Cardiovascular:      Rate and Rhythm: Normal rate. Rhythm irregular.      Pulses: Normal pulses.           Dorsalis pedis pulses are 1+ on the right side and 1+ on the left side.      Heart sounds:      No friction rub.   Pulmonary:      Effort: Pulmonary effort is normal. No accessory muscle usage, respiratory distress or retractions.      Breath sounds: No wheezing, rhonchi or rales.   Abdominal:      General: Bowel sounds are normal. There is no distension.      Palpations: Abdomen is soft.      Tenderness: There is no abdominal tenderness. There is no guarding.   Musculoskeletal:      Cervical back: Neck supple. No rigidity.      Right lower leg: No edema.      Left lower leg: No edema.      Comments: RLE neurovascularly intact.    Skin:     General: Skin is warm and dry.      Capillary Refill: Capillary refill takes 2 to 3 seconds.      Coloration: Skin is pale.   Neurological:      Mental Status: She is alert and oriented to person, place, and time. Mental status is at baseline.      Cranial Nerves: No dysarthria or facial asymmetry.      Sensory: Sensation is intact.      Motor: Weakness (generalized) present. No tremor.   Psychiatric:         Attention and Perception: Attention normal.         Mood and Affect: Mood normal.         Speech: Speech normal.         Behavior: Behavior normal. Behavior is cooperative.         Thought Content: Thought content normal.         Cognition and Memory: Cognition normal.         Judgment: Judgment normal.       ---------------------------------------------------------------------------------------------------------------------  EKG:  Appearing Afib with controlled rate in the 90s. Fascicular block. LVH. Confirmed by Cardiology, Dr. Spears.     Telemetry:  Patient was not on bedside monitor in  during my evaluation.  "  ---------------------------------------------------------------------------------------------------------------------   Results from last 7 days   Lab Units 05/02/25  1633   WBC 10*3/mm3 11.72*   HEMOGLOBIN g/dL 13.7   HEMATOCRIT % 44.1   MCV fL 95.2   MCHC g/dL 31.1*   PLATELETS 10*3/mm3 286         Results from last 7 days   Lab Units 05/02/25  1633   SODIUM mmol/L 135*   POTASSIUM mmol/L 4.3   CHLORIDE mmol/L 97*   CO2 mmol/L 21.7*   BUN mg/dL 11   CREATININE mg/dL 0.96   CALCIUM mg/dL 9.7   GLUCOSE mg/dL 160*   ALBUMIN g/dL 4.2   BILIRUBIN mg/dL 1.1   ALK PHOS U/L 120*   AST (SGOT) U/L 20   ALT (SGPT) U/L 14   Estimated Creatinine Clearance: 46.7 mL/min (by C-G formula based on SCr of 0.96 mg/dL).  No results found for: \"AMMONIA\"          Lab Results   Component Value Date    HGBA1C 5.50 08/31/2017     Lab Results   Component Value Date    TSH 2.120 05/04/2019     No results found for: \"PREGTESTUR\", \"PREGSERUM\", \"HCG\", \"HCGQUANT\"  Pain Management Panel           No data to display                  ---------------------------------------------------------------------------------------------------------------------  Imaging Results (Last 7 Days)       Procedure Component Value Units Date/Time    CT Lower Extremity Right Without Contrast [[id number]] Collected: 05/02/25 2004     Updated: 05/02/25 2007    Narrative:      REASON FOR EXAMINATION: Fall trauma pain.     COMPARISON: None available.     TECHNIQUE: CT of the right knee is performed on a multi-detector CT.  Coronal and sagittal reconstructions were obtained. CT Scan performed  according to as low as reasonably achievable dose protocol.     FINDINGS:  There is an impacted fracture of the lateral right tibial plateau.  Loss  of height is 16 mm image 55 of series 5.  Fracture line also extends at  least to the lateral margin of the the tibial spines image 53 of series  5.  There is osteoarthritis with joint space loss medial femorotibial  joint space as well " as chondrocalcinosis.  No fracture of the medial  tibial plateau is seen.  The fibula appears preserved.  Degenerative  change patellofemoral joint particularly laterally.       Impression:      Lateral tibial plateau fracture with depression as described.     This report was finalized on 5/2/2025 8:05 PM by YANNA UMANZOR MD.       XR Femur 2 View Right [[id number]] Collected: 05/02/25 1748     Updated: 05/02/25 1807    Narrative:      Exam: XR FEMUR 2 VW RIGHT-     History: Injury     Comparison: No images available     Findings:     No acute fracture or dislocation.     Soft tissue are normal.       Impression:      Impression:     No acute bony process of the femur.     This report was finalized on 5/2/2025 5:49 PM by Paco Soliman MD.       XR Tibia Fibula 2 View Right [[id number]] Collected: 05/02/25 1749     Updated: 05/02/25 1806    Narrative:      Exam: XR TIBIA FIBULA 2 VW RIGHT-     History: injury     Comparison: No images available     Findings:     Comminuted depressed lateral tibial plateau fracture. Question fibula  shaft fracture.      No other acute fracture or dislocation.     Soft tissue are normal.       Impression:      Impression:     Tibial plateau fracture with possible fibula shaft fracture.      This report was finalized on 5/2/2025 5:50 PM by Paco Soliman MD.       XR Ankle 3+ View Right [[id number]] Collected: 05/02/25 1750     Updated: 05/02/25 1805    Narrative:      Exam: XR ANKLE 3+ VW RIGHT-     History: injury     Comparison: No images available     Findings:     No acute fracture or dislocation.     Soft tissue are normal.       Impression:      Impression:     No acute bony process.     This report was finalized on 5/2/2025 5:50 PM by Paco Soliman MD.       XR Shoulder 2+ View Right [[id number]] Collected: 05/02/25 1746     Updated: 05/02/25 1749    Narrative:      Exam: XR SHOULDER 2+ VW RIGHT-     History: Injury     Comparison: No images available     Findings:      Severe subacromial space narrowing with chronic humeral neck deformity.      No acute fracture or dislocation.     Soft tissue are normal.       Impression:      Impression:     No acute bony process.     This report was finalized on 5/2/2025 5:47 PM by Paco Soliman MD.       XR Chest 1 View [[id number]] Collected: 05/02/25 1744     Updated: 05/02/25 1749    Narrative:      Procedure: Frontal view of chest obtained.     Comparison: 5/14/2019     History: weakness     Findings:     Bilateral lung reticulation.   Normal heart size and mediastinal contours.  Trachea is in midline position.  No edema or effusion is seen.  There is no evidence of pneumothorax.       Impression:         Bilateral lung reticulation which is new. Atypical infection versus  fibrosis.      This report was finalized on 5/2/2025 5:46 PM by Paco Soliman MD.             Last echocardiogram: No previous echo on file.     I have personally reviewed the above radiology images and read the final radiology report on 05/03/25  ---------------------------------------------------------------------------------------------------------------------  Assessment / Plan     Active Hospital Problems    Diagnosis  POA    **Tibial plateau fracture, right [S82.141A]  Yes       ASSESSMENT/PLAN:  #Acute, lateral tibial plateau fracture status post mechanical fall prior to arrival   #Mild leukocytosis (POA), likely reactive to fracture  #Anion gap metabolic acidosis (POA)  --Radiological images reviewed.   --Orthopedic surgery consulted, input/assistance is much appreciated.   --PRN IV/PO pain medication available with holding parameters to prevent hypotension, oversedation, and/or respiratory depression.   --Scheduled high-dose Tylenol.  --Neurovascular checks Q 4 hours.   --Fall precautions.   --PT/OT consults placed.   --Case management consulted for assistance with discharge plans. Greatly appreciate their assistance.   --LR infusion at 75 mL an hour x 10  hours per attending.  --Repeat labs in the a.m. (CBC and CMP).    #Atrial fibrillation; EKG obtained on arrival revealed A-fib with controlled rate.  Potassium 4.3.  Obtain magnesium.  Replace electrolytes per protocol as necessary.  Check TSH.  Continuous cardiac monitoring ordered.  Review home rate/rhythm controlling agents once reconciled by pharmacy with plans to continue.  Holding parameters to prevent hypotension and/or bradycardia.  #Chronic anticoagulation with Eliquis; hold Eliquis for now pending formal evaluation by orthopedic surgery.  #Hypertension; BP appears overall stable throughout ED course.  Review home antihypertensive regimen once reconciled by pharmacy with plans to continue.  Holding parameters to prevent hypotension and/or bradycardia.  #History of TIA; no acute focal neurological deficits appreciated on exam.  Patient denies headache or dizziness.  #Depression and anxiety; supportive care.  #History of paraesophageal hernia  #History of dysphagia; SLP consult placed for diet recommendations.  Maintain aspiration precautions.  #GERD; reflux precautions.  PPI.  #Arthritis, scoliosis, and chronic back pain; supportive care.      ----------  -DVT prophylaxis: SCD, Left Leg Only.  Holding Eliquis for now pending formal evaluation by orthopedic surgery.  Last dose of Eliquis reportedly on the morning of 5/2/2025.  -Activity: Bedrest for now. Pending Orthopedic Surgery evaluation.   -Expected length of stay:   OBSERVATION status; however, if further evaluation or treatment plans warrant, status may change.  Based upon current information, I predict patient's care encounter to be less than or equal to 2 midnights   -Disposition will likely require SNF placement. Case management consulted for assistance with discharge plans (as noted above).     High risk secondary to acute lateral tibial plateau fracture status post mechanical fall PTA, mild leukocytosis, and anion gap metabolic acidosis.     CODE  STATUS: FULL CODE      Nida Cleaning, APRN   05/03/25  01:21 EDT  Pager #[phone]  ---------------------------------------------------------------------------------------------------------------------

## 2025-05-03 NOTE — PLAN OF CARE
Goal Outcome Evaluation:  Plan of Care Reviewed With: patient        Progress: no change  Outcome Evaluation: Patient was admitted from ED this shift. Patient resting in bed during shift. Patient A&Ox4, VSS on room air. Patient voices no concerns or complaints at this time. Will continue plan of care.

## 2025-05-03 NOTE — CONSULTS
Inpatient Orthopedic Surgery Consult  Consult performed by: Theresa Iverson APRN  Consult ordered by: Pia Wren DO          Patient Identification:  Name:  Johanna Street  Age:  79 y.o.  Sex:  female  :  1945  MRN:  [id number]  Visit Number:  [id number]  Primary care provider:  Bi Youssef MD    History of present illness:  Patient is a 80 y/o female with a PMH significant for A-fib anticoagulated with Eliquis, TIA, HTN, GERD, chronic back pain who presented to [facility] ED following a fall. She fell getting into her car after her leg gave out.  X rays and CT revealed a right lateral tibial plateau fracture.  The patient states she has been feeling generally weak for about the past 4 weeks, since having a severe episode of back pain.  She denies any injury at that time.  She is wearing a knee immobilizer and states that her knee pain is controlled.    ---------------------------------------------------------------------------------------------------------------------    Review of Systems   Constitutional:  Positive for activity change.   Respiratory:  Negative for shortness of breath.    Cardiovascular:  Negative for chest pain.   Musculoskeletal:  Positive for arthralgias, gait problem and neck pain.   Neurological:  Positive for numbness. Negative for weakness.   All other systems reviewed and are negative.     ---------------------------------------------------------------------------------------------------------------------   Past History:  Family History   Problem Relation Age of Onset    Heart attack Mother     Stroke Mother     Coronary artery disease Mother     Diabetes Mother     Hypertension Mother     Heart disease Father     Arthritis Father     Coronary artery disease Father     Liver disease Brother      Past Medical History:   Diagnosis Date    Ankle edema     Anxiety     Arthritis     Atrial fibrillation     Back pain     Depression     Hernia, hiatal     PAF (paroxysmal  atrial fibrillation)      Past Surgical History:   Procedure Laterality Date    APPENDECTOMY      CARDIAC CATHETERIZATION  12/2013    CHOLECYSTECTOMY      COLON SURGERY      COLONOSCOPY  2006    ENDOSCOPY N/A 12/7/2017    Procedure: ESOPHAGOGASTRODUODENOSCOPY WITH ANESTHESIA;  Surgeon: Eliseo Moon MD;  [place] [facility]  Service:     ENDOSCOPY N/A 2/21/2018    Procedure: ESOPHAGOGASTRODUODENOSCOPY WITH DILATATION CPT CODE: 74822;  Surgeon: Tomi Dey III, MD;  [place] [facility] [place];  Service:     EYE SURGERY      HYSTERECTOMY      NERVE REPAIR      TONSILLECTOMY       Social History     Socioeconomic History    Marital status:    Tobacco Use    Smoking status: Never    Smokeless tobacco: Never   Vaping Use    Vaping status: Never Used   Substance and Sexual Activity    Alcohol use: No    Drug use: No    Sexual activity: Defer     ---------------------------------------------------------------------------------------------------------------------   Allergies:  Codeine, Diltiazem, Zinc, Levaquin [levofloxacin], and Penicillins  ---------------------------------------------------------------------------------------------------------------------   Prior to Admission Medications       Prescriptions Last Dose Informant Patient Reported? Taking?    apixaban (ELIQUIS) 5 MG tablet tablet 5/2/2025 Self, Family Member No Yes    Take 1 tablet by mouth Every 12 (Twelve) Hours.    diazepam (VALIUM) 5 MG tablet 5/2/2025 Self, Family Member Yes Yes    Take 1 tablet by mouth Daily.    furosemide (LASIX) 20 MG tablet 5/2/2025 Self, Family Member Yes Yes    Take 1 tablet by mouth Daily.    lansoprazole (PREVACID) 15 MG capsule 5/2/2025 Self, Family Member Yes Yes    Take 1 capsule by mouth Daily.    sotalol (BETAPACE) 80 MG tablet 5/2/2025 Self, Family Member Yes Yes    Take 1 tablet by mouth 2 (Two) Times a Day.    divalproex (DEPAKOTE) 125 MG DR tablet Unknown Self, Family Member Yes No    Take 1 tablet by  mouth Daily As Needed (Headache).          [facility] Meds:  acetaminophen, 1,000 mg, Oral, Q8H  diazePAM, 5 mg, Oral, Daily  furosemide, 20 mg, Oral, Daily  [START ON 5/4/2025] pantoprazole, 40 mg, Oral, Q AM  senna-docusate sodium, 2 tablet, Oral, BID  sodium chloride, 10 mL, Intravenous, Q12H  sotalol, 80 mg, Oral, BID      sodium chloride, 100 mL/hr, Last Rate: 100 mL/hr (05/03/25 1200)      ---------------------------------------------------------------------------------------------------------------------   Vital Signs:  Temp:  [97.6 °F (36.4 °C)-98.2 °F (36.8 °C)] 98 °F (36.7 °C)  Heart Rate:  [] 94  Resp:  [16-18] 16  BP: ()/(59-97) 142/69      05/02/25  1545 05/03/25  0002   Weight: 78.5 kg (173 lb) 81.2 kg (179 lb 0.2 oz)     Body mass index is 33.82 kg/m².  ---------------------------------------------------------------------------------------------------------------------     Physical exam:  Constitutional:  Well-developed and well-nourished.  No acute distress.      HENT:  Head: Normocephalic and atraumatic.  Mouth:  Moist mucous membranes.    Eyes:  Conjunctivae are normal.  Pupils are equal, round, and reactive to light.  No scleral icterus.  Neck:  Neck supple.  Trachea midline.  Cardiovascular:  Normal rate and regular rhythm.  Pulmonary/Chest:  No respiratory distress and good air movement.  Abdominal:  Soft.  No distension and no tenderness.   Musculoskeletal: Right knee: Knee immobilizer.  Mild soft tissue swelling.  Right dorsi and plantarflexion is intact.  +2 DP pulse.           Neurological:  Alert and oriented to person, place, and time.     Skin:  Skin is warm and dry.  No rash noted.  No ecchymosis or abrasion.   Psychiatric:  Normal mood and affect.  Behavior is normal.  Judgment and thought content normal.   Peripheral vascular:  No pitting edema and strong pulses on all 4  "extremities.      ---------------------------------------------------------------------------------------------------------------------   .  ---------------------------------------------------------------------------------------------------------------------   Results from last 7 days   Lab Units 05/03/25  0107 05/02/25  1633   WBC 10*3/mm3 13.41* 11.72*   HEMOGLOBIN g/dL 12.1 13.7   HEMATOCRIT % 38.0 44.1   MCV fL 94.8 95.2   MCHC g/dL 31.8 31.1*   PLATELETS 10*3/mm3 225 286         Results from last 7 days   Lab Units 05/03/25  0107 05/02/25  1633   SODIUM mmol/L 131* 135*   POTASSIUM mmol/L 5.0 4.3   MAGNESIUM mg/dL  --  2.0   CHLORIDE mmol/L 96* 97*   CO2 mmol/L 21.0* 21.7*   BUN mg/dL 11 11   CREATININE mg/dL 0.89 0.96   CALCIUM mg/dL 9.1 9.7   GLUCOSE mg/dL 138* 160*   ALBUMIN g/dL 3.5 4.2   BILIRUBIN mg/dL 1.4* 1.1   ALK PHOS U/L 109 120*   AST (SGOT) U/L 33* 20   ALT (SGPT) U/L 16 14   Estimated Creatinine Clearance: 50.3 mL/min (by C-G formula based on SCr of 0.89 mg/dL).  No results found for: \"AMMONIA\"          Lab Results   Component Value Date    HGBA1C 5.50 08/31/2017     Lab Results   Component Value Date    TSH 4.760 (H) 05/02/2025     No results found for: \"PREGTESTUR\", \"PREGSERUM\", \"HCG\", \"HCGQUANT\"  Pain Management Panel          Latest Ref Rng & Units 5/3/2025   Pain Management Panel   Amphetamine, Urine Qual Negative Negative    Barbiturates Screen, Urine Negative Negative    Benzodiazepine Screen, Urine Negative Positive    Buprenorphine, Screen, Urine Negative Negative    Cocaine Screen, Urine Negative Negative    Fentanyl, Urine Negative Negative    Methadone Screen , Urine Negative Negative    Methamphetamine, Ur Negative Negative      No results found for: \"BLOODCX\"  No results found for: \"URINECX\"  No results found for: \"WOUNDCX\"  No results found for: \"STOOLCX\"      --------------------------------------------------------------------------------------------------------------------- "   Imaging Results (Last 7 Days)       Procedure Component Value Units Date/Time    CT Lower Extremity Right Without Contrast [[id number]] Collected: 05/02/25 2004     Updated: 05/02/25 2007    Narrative:      REASON FOR EXAMINATION: Fall trauma pain.     COMPARISON: None available.     TECHNIQUE: CT of the right knee is performed on a multi-detector CT.  Coronal and sagittal reconstructions were obtained. CT Scan performed  according to as low as reasonably achievable dose protocol.     FINDINGS:  There is an impacted fracture of the lateral right tibial plateau.  Loss  of height is 16 mm image 55 of series 5.  Fracture line also extends at  least to the lateral margin of the the tibial spines image 53 of series  5.  There is osteoarthritis with joint space loss medial femorotibial  joint space as well as chondrocalcinosis.  No fracture of the medial  tibial plateau is seen.  The fibula appears preserved.  Degenerative  change patellofemoral joint particularly laterally.       Impression:      Lateral tibial plateau fracture with depression as described.     This report was finalized on 5/2/2025 8:05 PM by YANNA UMANZOR MD.       XR Femur 2 View Right [[id number]] Collected: 05/02/25 1748     Updated: 05/02/25 1807    Narrative:      Exam: XR FEMUR 2 VW RIGHT-     History: Injury     Comparison: No images available     Findings:     No acute fracture or dislocation.     Soft tissue are normal.       Impression:      Impression:     No acute bony process of the femur.     This report was finalized on 5/2/2025 5:49 PM by Paco Soliman MD.       XR Tibia Fibula 2 View Right [[id number]] Collected: 05/02/25 1749     Updated: 05/02/25 1806    Narrative:      Exam: XR TIBIA FIBULA 2 VW RIGHT-     History: injury     Comparison: No images available     Findings:     Comminuted depressed lateral tibial plateau fracture. Question fibula  shaft fracture.      No other acute fracture or dislocation.     Soft tissue are  normal.       Impression:      Impression:     Tibial plateau fracture with possible fibula shaft fracture.      This report was finalized on 5/2/2025 5:50 PM by Paco Soliman MD.       XR Ankle 3+ View Right [[id number]] Collected: 05/02/25 1750     Updated: 05/02/25 1805    Narrative:      Exam: XR ANKLE 3+ VW RIGHT-     History: injury     Comparison: No images available     Findings:     No acute fracture or dislocation.     Soft tissue are normal.       Impression:      Impression:     No acute bony process.     This report was finalized on 5/2/2025 5:50 PM by Paco Soliman MD.       XR Shoulder 2+ View Right [[id number]] Collected: 05/02/25 1746     Updated: 05/02/25 1749    Narrative:      Exam: XR SHOULDER 2+ VW RIGHT-     History: Injury     Comparison: No images available     Findings:     Severe subacromial space narrowing with chronic humeral neck deformity.      No acute fracture or dislocation.     Soft tissue are normal.       Impression:      Impression:     No acute bony process.     This report was finalized on 5/2/2025 5:47 PM by Paco Soliman MD.       XR Chest 1 View [[id number]] Collected: 05/02/25 1744     Updated: 05/02/25 1749    Narrative:      Procedure: Frontal view of chest obtained.     Comparison: 5/14/2019     History: weakness     Findings:     Bilateral lung reticulation.   Normal heart size and mediastinal contours.  Trachea is in midline position.  No edema or effusion is seen.  There is no evidence of pneumothorax.       Impression:         Bilateral lung reticulation which is new. Atypical infection versus  fibrosis.      This report was finalized on 5/2/2025 5:46 PM by Paco Soliman MD.             ----------------------------------------------------------------------------------------------------------------------      Assessment: Right lateral tibial plateau fracture       Plan:     Imaging was reviewed with Dr. Schwartz.  CT scan has been completed.    Patient is  recommended for outpatient follow-up for scheduling of surgery, however, she states that she lives alone and will not be able to manage by herself.    Continue the knee immobilizer.  Remain nonweightbearing to the right lower extremity at all times.    Pain control as needed.    Orthopedic surgery will continue to follow.    Thank you for the consult.    DAVID Dukes  05/03/25  12:48 EDT

## 2025-05-03 NOTE — PLAN OF CARE
Goal Outcome Evaluation:  Plan of Care Reviewed With: patient        Progress: no change  Outcome Evaluation: Pt sitting in bed. VSS on RA. PO lasix given this shift. NS @ 100 ml/hr. No acute changes. Will continue POC.

## 2025-05-03 NOTE — PROGRESS NOTES
[facility]     Patient Identification:  Name:  Johanna Street  Age:  79 y.o.  Sex:  female  :  1945  MRN:  [id number]  Visit Number:  [id number]  Primary Care Provider:  Bi Youssef MD    Length of stay:  0    Chief complaint: Left lower extremity pain    Subjective:    Patient reports mild left lower extremity pain which is currently well-controlled with current pain medication regimen.  She denies any other complaints at this time.  Per nursing staff, no new events overnight.  ----------------------------------------------------------------------------------------------------------------------  Current Hospital Meds:  acetaminophen, 1,000 mg, Oral, Q8H  senna-docusate sodium, 2 tablet, Oral, BID  sodium chloride, 10 mL, Intravenous, Q12H      sodium chloride, 100 mL/hr, Last Rate: 100 mL/hr (25 0544)      ----------------------------------------------------------------------------------------------------------------------  Vital Signs:  Temp:  [97.6 °F (36.4 °C)-98.2 °F (36.8 °C)] 98 °F (36.7 °C)  Heart Rate:  [] 99  Resp:  [16-18] 16  BP: ()/(59-97) 122/71      25  1545 25  0002   Weight: 78.5 kg (173 lb) 81.2 kg (179 lb 0.2 oz)     Body mass index is 33.82 kg/m².    Intake/Output Summary (Last 24 hours) at 5/3/2025 1113  Last data filed at 5/3/2025 0900  Gross per 24 hour   Intake 240 ml   Output --   Net 240 ml     Diet: Cardiac; Healthy Heart (2-3 Na+); Fluid Consistency: Thin (IDDSI 0)  ----------------------------------------------------------------------------------------------------------------------  Physical exam:  Constitutional: Well-nourished  female in no apparent distress.     HENT:  Head:  Normocephalic and atraumatic.  Mouth:  Moist mucous membranes.    Eyes:  Conjunctivae and EOM are normal.  Pupils are equal, round, and reactive to light.  No scleral icterus.    Neck:  Neck supple. No thyromegaly.  No JVD  "present.    Cardiovascular:  Regular rate and rhythm with no murmurs, rubs, clicks or gallops appreciated.  Pulmonary/Chest:  Clear to auscultation bilaterally with no crackles, wheezes or rhonchi appreciated.  Abdominal:  Soft. Nontender. Nondistended  Bowel sounds are normal in all four quadrants. No organomegally appreciated.   Musculoskeletal:  5/5 muscle strength bilateral upper and lower extremities with equal muscle tone and bulk. No edema, no tenderness, and no deformity.  No red or swollen joints anywhere.    Neurological:  Alert and oriented to person, place, and time. Cranial nerves II-XII intact with no focal deficits.  No facial droop.  No slurred speech.   Skin:  Warm and dry to palpation with no rashes or lesions appreciated.  Peripheral vascular:  2+ radial and pedal pulses in bilateral upper and lower extremities.  Psychiatric:  Alert and oriented x3, demonstrates appropriate judgment and insight.  ---------------------------------------------------------------------------------------  ----------------------------------------------------------------------------------------------------------------------      Results from last 7 days   Lab Units 05/03/25  0107 05/02/25  1633   WBC 10*3/mm3 13.41* 11.72*   HEMOGLOBIN g/dL 12.1 13.7   HEMATOCRIT % 38.0 44.1   MCV fL 94.8 95.2   MCHC g/dL 31.8 31.1*   PLATELETS 10*3/mm3 225 286         Results from last 7 days   Lab Units 05/03/25  0107 05/02/25  1633   SODIUM mmol/L 131* 135*   POTASSIUM mmol/L 5.0 4.3   MAGNESIUM mg/dL  --  2.0   CHLORIDE mmol/L 96* 97*   CO2 mmol/L 21.0* 21.7*   BUN mg/dL 11 11   CREATININE mg/dL 0.89 0.96   CALCIUM mg/dL 9.1 9.7   GLUCOSE mg/dL 138* 160*   ALBUMIN g/dL 3.5 4.2   BILIRUBIN mg/dL 1.4* 1.1   ALK PHOS U/L 109 120*   AST (SGOT) U/L 33* 20   ALT (SGPT) U/L 16 14   Estimated Creatinine Clearance: 50.3 mL/min (by C-G formula based on SCr of 0.89 mg/dL).    No results found for: \"AMMONIA\"      No results found for: " "\"BLOODCX\"  No results found for: \"URINECX\"  No results found for: \"WOUNDCX\"  No results found for: \"STOOLCX\"    I have personally looked at the labs and they are summarized above.  ----------------------------------------------------------------------------------------------------------------------  Imaging Results (Last 24 Hours)       Procedure Component Value Units Date/Time    CT Lower Extremity Right Without Contrast [[id number]] Collected: 05/02/25 2004     Updated: 05/02/25 2007    Narrative:      REASON FOR EXAMINATION: Fall trauma pain.     COMPARISON: None available.     TECHNIQUE: CT of the right knee is performed on a multi-detector CT.  Coronal and sagittal reconstructions were obtained. CT Scan performed  according to as low as reasonably achievable dose protocol.     FINDINGS:  There is an impacted fracture of the lateral right tibial plateau.  Loss  of height is 16 mm image 55 of series 5.  Fracture line also extends at  least to the lateral margin of the the tibial spines image 53 of series  5.  There is osteoarthritis with joint space loss medial femorotibial  joint space as well as chondrocalcinosis.  No fracture of the medial  tibial plateau is seen.  The fibula appears preserved.  Degenerative  change patellofemoral joint particularly laterally.       Impression:      Lateral tibial plateau fracture with depression as described.     This report was finalized on 5/2/2025 8:05 PM by YANNA UMANZOR MD.       XR Femur 2 View Right [[id number]] Collected: 05/02/25 1748     Updated: 05/02/25 1807    Narrative:      Exam: XR FEMUR 2 VW RIGHT-     History: Injury     Comparison: No images available     Findings:     No acute fracture or dislocation.     Soft tissue are normal.       Impression:      Impression:     No acute bony process of the femur.     This report was finalized on 5/2/2025 5:49 PM by Paco Soliman MD.       XR Tibia Fibula 2 View Right [[id number]] Collected: 05/02/25 1749     " Updated: 05/02/25 1806    Narrative:      Exam: XR TIBIA FIBULA 2 VW RIGHT-     History: injury     Comparison: No images available     Findings:     Comminuted depressed lateral tibial plateau fracture. Question fibula  shaft fracture.      No other acute fracture or dislocation.     Soft tissue are normal.       Impression:      Impression:     Tibial plateau fracture with possible fibula shaft fracture.      This report was finalized on 5/2/2025 5:50 PM by Paco Soliman MD.       XR Ankle 3+ View Right [[id number]] Collected: 05/02/25 1750     Updated: 05/02/25 1805    Narrative:      Exam: XR ANKLE 3+ VW RIGHT-     History: injury     Comparison: No images available     Findings:     No acute fracture or dislocation.     Soft tissue are normal.       Impression:      Impression:     No acute bony process.     This report was finalized on 5/2/2025 5:50 PM by Paco Soliman MD.       XR Shoulder 2+ View Right [[id number]] Collected: 05/02/25 1746     Updated: 05/02/25 1749    Narrative:      Exam: XR SHOULDER 2+ VW RIGHT-     History: Injury     Comparison: No images available     Findings:     Severe subacromial space narrowing with chronic humeral neck deformity.      No acute fracture or dislocation.     Soft tissue are normal.       Impression:      Impression:     No acute bony process.     This report was finalized on 5/2/2025 5:47 PM by Paco Soliman MD.       XR Chest 1 View [[id number]] Collected: 05/02/25 1744     Updated: 05/02/25 1749    Narrative:      Procedure: Frontal view of chest obtained.     Comparison: 5/14/2019     History: weakness     Findings:     Bilateral lung reticulation.   Normal heart size and mediastinal contours.  Trachea is in midline position.  No edema or effusion is seen.  There is no evidence of pneumothorax.       Impression:         Bilateral lung reticulation which is new. Atypical infection versus  fibrosis.      This report was finalized on 5/2/2025 5:46 PM by Paco  MD Jourdan.             ----------------------------------------------------------------------------------------------------------------------  Assessment and Plan:    Acute lateral tibial plateau fracture -patient has been placed in a knee immobilizer and will plan for orthopedic surgery consultation during this hospitalization per admitting provider.  Continue.  Pain medications.  Patient unable to care for herself at home independently, will consult PT/OT and case management for possible placement.    2.  Atrial fibrillation -chronic in nature, currently rate controlled.  Will restart Eliquis once orthopedic surgery confirms no intention for immediate surgical intervention    3.  Essential hypertension -will restart home antihypertensive medications once available from pharmacy    4.  GERD - protonix    Disposition currently pending placement    Matheus Lovett DO   05/03/25   11:13 EDT

## 2025-05-04 LAB
GLUCOSE BLDC GLUCOMTR-MCNC: 128 MG/DL (ref 70–130)
QT INTERVAL: 374 MS
QTC INTERVAL: 484 MS

## 2025-05-04 PROCEDURE — 99232 SBSQ HOSP IP/OBS MODERATE 35: CPT | Performed by: INTERNAL MEDICINE

## 2025-05-04 PROCEDURE — G0378 HOSPITAL OBSERVATION PER HR: HCPCS

## 2025-05-04 PROCEDURE — 25810000003 SODIUM CHLORIDE 0.9 % SOLUTION: Performed by: INTERNAL MEDICINE

## 2025-05-04 PROCEDURE — 93010 ELECTROCARDIOGRAM REPORT: CPT | Performed by: STUDENT IN AN ORGANIZED HEALTH CARE EDUCATION/TRAINING PROGRAM

## 2025-05-04 PROCEDURE — 93005 ELECTROCARDIOGRAM TRACING: CPT | Performed by: INTERNAL MEDICINE

## 2025-05-04 PROCEDURE — 82948 REAGENT STRIP/BLOOD GLUCOSE: CPT

## 2025-05-04 RX ADMIN — SOTALOL HYDROCHLORIDE 80 MG: 80 TABLET ORAL at 20:51

## 2025-05-04 RX ADMIN — SOTALOL HYDROCHLORIDE 80 MG: 80 TABLET ORAL at 08:54

## 2025-05-04 RX ADMIN — OXYCODONE 5 MG: 5 TABLET ORAL at 14:27

## 2025-05-04 RX ADMIN — Medication 10 ML: at 08:55

## 2025-05-04 RX ADMIN — SENNOSIDES AND DOCUSATE SODIUM 2 TABLET: 50; 8.6 TABLET ORAL at 08:54

## 2025-05-04 RX ADMIN — FUROSEMIDE 20 MG: 20 TABLET ORAL at 08:54

## 2025-05-04 RX ADMIN — SODIUM CHLORIDE 100 ML/HR: 9 INJECTION, SOLUTION INTRAVENOUS at 14:22

## 2025-05-04 RX ADMIN — Medication 10 ML: at 20:52

## 2025-05-04 RX ADMIN — SODIUM CHLORIDE 100 ML/HR: 9 INJECTION, SOLUTION INTRAVENOUS at 03:24

## 2025-05-04 RX ADMIN — DIAZEPAM 5 MG: 5 TABLET ORAL at 08:54

## 2025-05-04 RX ADMIN — ACETAMINOPHEN 1000 MG: 500 TABLET ORAL at 00:40

## 2025-05-04 RX ADMIN — PANTOPRAZOLE SODIUM 40 MG: 40 TABLET, DELAYED RELEASE ORAL at 05:23

## 2025-05-04 RX ADMIN — ACETAMINOPHEN 1000 MG: 500 TABLET ORAL at 16:43

## 2025-05-04 RX ADMIN — ACETAMINOPHEN 1000 MG: 500 TABLET ORAL at 08:54

## 2025-05-04 NOTE — PROGRESS NOTES
[facility]     Patient Identification:  Name:  Johanna Street  Age:  79 y.o.  Sex:  female  :  1945  MRN:  [id number]  Visit Number:  [id number]  Primary Care Provider:  Bi Youssef MD    Length of stay:  0    Chief complaint: Left lower extremity pain    Subjective:    Patient seen and examined at bedside with no nursing staff present.  Patient was asleep when entered the room.  Patient doing well with no complaints.  Currently pain-free and using left knee immobilizer.  No new events overnight.  Will be evaluated for potential placement tomorrow.  ----------------------------------------------------------------------------------------------------------------------  Current Hospital Meds:  acetaminophen, 1,000 mg, Oral, Q8H  diazePAM, 5 mg, Oral, Daily  furosemide, 20 mg, Oral, Daily  pantoprazole, 40 mg, Oral, Q AM  senna-docusate sodium, 2 tablet, Oral, BID  sodium chloride, 10 mL, Intravenous, Q12H  sotalol, 80 mg, Oral, BID      sodium chloride, 100 mL/hr, Last Rate: 100 mL/hr (25 0324)      ----------------------------------------------------------------------------------------------------------------------  Vital Signs:  Temp:  [97.9 °F (36.6 °C)-98.4 °F (36.9 °C)] 98.4 °F (36.9 °C)  Heart Rate:  [] 103  Resp:  [16-20] 18  BP: ()/(62-75) 107/66      25  1545 25  0002   Weight: 78.5 kg (173 lb) 81.2 kg (179 lb 0.2 oz)     Body mass index is 33.82 kg/m².    Intake/Output Summary (Last 24 hours) at 2025 1150  Last data filed at 2025 0300  Gross per 24 hour   Intake 840 ml   Output 400 ml   Net 440 ml     Diet: Cardiac; Healthy Heart (2-3 Na+); Fluid Consistency: Thin (IDDSI 0)  ----------------------------------------------------------------------------------------------------------------------  Physical exam:  Constitutional: Well-nourished  female in no apparent distress.     HENT:  Head:  Normocephalic and  atraumatic.  Mouth:  Moist mucous membranes.    Eyes:  Conjunctivae and EOM are normal.  Pupils are equal, round, and reactive to light.  No scleral icterus.    Neck:  Neck supple. No thyromegaly.  No JVD present.    Cardiovascular:  Regular rate and rhythm with no murmurs, rubs, clicks or gallops appreciated.  Pulmonary/Chest:  Clear to auscultation bilaterally with no crackles, wheezes or rhonchi appreciated.  Abdominal:  Soft. Nontender. Nondistended  Bowel sounds are normal in all four quadrants. No organomegally appreciated.   Musculoskeletal:  5/5 muscle strength bilateral upper and lower extremities with equal muscle tone and bulk. No edema, no tenderness, and no deformity.  No red or swollen joints anywhere.    Neurological:  Alert and oriented to person, place, and time. Cranial nerves II-XII intact with no focal deficits.  No facial droop.  No slurred speech.   Skin:  Warm and dry to palpation with no rashes or lesions appreciated.  Peripheral vascular:  2+ radial and pedal pulses in bilateral upper and lower extremities.  Psychiatric:  Alert and oriented x3, demonstrates appropriate judgment and insight.    No significant change in physical exam in comparison to 5/3/2025  ---------------------------------------------------------------------------------------  ----------------------------------------------------------------------------------------------------------------------      Results from last 7 days   Lab Units 05/03/25  0107 05/02/25  1633   WBC 10*3/mm3 13.41* 11.72*   HEMOGLOBIN g/dL 12.1 13.7   HEMATOCRIT % 38.0 44.1   MCV fL 94.8 95.2   MCHC g/dL 31.8 31.1*   PLATELETS 10*3/mm3 225 286         Results from last 7 days   Lab Units 05/03/25  0107 05/02/25  1633   SODIUM mmol/L 131* 135*   POTASSIUM mmol/L 5.0 4.3   MAGNESIUM mg/dL  --  2.0   CHLORIDE mmol/L 96* 97*   CO2 mmol/L 21.0* 21.7*   BUN mg/dL 11 11   CREATININE mg/dL 0.89 0.96   CALCIUM mg/dL 9.1 9.7   GLUCOSE mg/dL 138* 160*   ALBUMIN  "g/dL 3.5 4.2   BILIRUBIN mg/dL 1.4* 1.1   ALK PHOS U/L 109 120*   AST (SGOT) U/L 33* 20   ALT (SGPT) U/L 16 14   Estimated Creatinine Clearance: 50.3 mL/min (by C-G formula based on SCr of 0.89 mg/dL).    No results found for: \"AMMONIA\"      No results found for: \"BLOODCX\"  No results found for: \"URINECX\"  No results found for: \"WOUNDCX\"  No results found for: \"STOOLCX\"    I have personally looked at the labs and they are summarized above.  ----------------------------------------------------------------------------------------------------------------------  Imaging Results (Last 24 Hours)       ** No results found for the last 24 hours. **          ----------------------------------------------------------------------------------------------------------------------  Assessment and Plan:    Acute lateral tibial plateau fracture -patient has been placed in a knee immobilizer.  Orthopedic surgery has recommended outpatient follow-up for further evaluation regarding potential need of surgery.  However, patient unable to take care of herself.  Currently being evaluated by PT/OT and case management has been consulted to assist with placement.    2.  Atrial fibrillation -chronic in nature, currently rate controlled.  Will restart Eliquis today as orthopedic surgery stated in their last note that there is no intention for surgery in the near future.    3.  Essential hypertension -well-controlled    4.  GERD - protonix    Disposition currently pending placement    Matheus Lovett DO   05/04/25   11:50 EDT     "

## 2025-05-04 NOTE — PLAN OF CARE
Goal Outcome Evaluation:            Pt A/O  x4. VSS. No acute changes. Meds given. Tolerated routine meds. Will continue to monitor. Will continue POC.

## 2025-05-04 NOTE — PROGRESS NOTES
"Inpatient Progress Note  Johanna Street  Date: 05/04/25  MRN: [id number]      Subjective:  Patient seen and evaluated regarding right lateral tibial plateau fracture. She endorses moderate persistent pain and swelling to the lateral right knee.  She has been nonweightbearing and is wearing the knee immobilizer.  She has complaints of chronic shoulder and knee pain and has difficulty completing her ADLs without assistance.     Objective:      Vitals:    05/03/25 2204 05/04/25 0300 05/04/25 0632 05/04/25 1049   BP: 114/74 98/64 107/65 107/66   BP Location: Right arm Right arm Right arm Right arm   Patient Position: Lying Lying Lying Lying   Pulse:  103     Resp: 18 18 18 18   Temp: 98.2 °F (36.8 °C) 98.2 °F (36.8 °C) 98.4 °F (36.9 °C)    TempSrc: Oral Oral Oral    SpO2:       Weight:       Height:              Physical Exam:  Constitutional:  Well-developed, well-nourished.  No acute distress.        Musculoskeletal:  Right knee exam: Moderate effusion.  No erythema.  Ecchymosis to the lateral knee.  No open wounds.  Calf is soft and nontender.  Light touch sensation intact all dermatomes.  + Dorsi and plantarflexion, +2 DP pulse.     Labs:    Results from last 7 days   Lab Units 05/03/25  0107 05/02/25  1633   WBC 10*3/mm3 13.41* 11.72*   HEMOGLOBIN g/dL 12.1 13.7   HEMATOCRIT % 38.0 44.1   MCV fL 94.8 95.2   MCHC g/dL 31.8 31.1*   PLATELETS 10*3/mm3 225 286         Results from last 7 days   Lab Units 05/03/25  0107 05/02/25  1633   SODIUM mmol/L 131* 135*   POTASSIUM mmol/L 5.0 4.3   MAGNESIUM mg/dL  --  2.0   CHLORIDE mmol/L 96* 97*   CO2 mmol/L 21.0* 21.7*   BUN mg/dL 11 11   CREATININE mg/dL 0.89 0.96   CALCIUM mg/dL 9.1 9.7   GLUCOSE mg/dL 138* 160*   ALBUMIN g/dL 3.5 4.2   BILIRUBIN mg/dL 1.4* 1.1   ALK PHOS U/L 109 120*   AST (SGOT) U/L 33* 20   ALT (SGPT) U/L 16 14   Estimated Creatinine Clearance: 50.3 mL/min (by C-G formula based on SCr of 0.89 mg/dL).  No results found for: \"AMMONIA\"          No results " "found for: \"HGBA1C\"  Lab Results   Component Value Date    TSH 4.760 (H) 05/02/2025         Pain Management Panel          Latest Ref Rng & Units 5/3/2025   Pain Management Panel   Amphetamine, Urine Qual Negative Negative    Barbiturates Screen, Urine Negative Negative    Benzodiazepine Screen, Urine Negative Positive    Buprenorphine, Screen, Urine Negative Negative    Cocaine Screen, Urine Negative Negative    Fentanyl, Urine Negative Negative    Methadone Screen , Urine Negative Negative    Methamphetamine, Ur Negative Negative        No results found for: \"BLOODCX\"  No results found for: \"URINECX\"  No results found for: \"WOUNDCX\"  No results found for: \"STOOLCX\"              Radiology:  Imaging Results (Last 72 Hours)       Procedure Component Value Units Date/Time    CT Lower Extremity Right Without Contrast [[id number]] Collected: 05/02/25 2004     Updated: 05/02/25 2007    Narrative:      REASON FOR EXAMINATION: Fall trauma pain.     COMPARISON: None available.     TECHNIQUE: CT of the right knee is performed on a multi-detector CT.  Coronal and sagittal reconstructions were obtained. CT Scan performed  according to as low as reasonably achievable dose protocol.     FINDINGS:  There is an impacted fracture of the lateral right tibial plateau.  Loss  of height is 16 mm image 55 of series 5.  Fracture line also extends at  least to the lateral margin of the the tibial spines image 53 of series  5.  There is osteoarthritis with joint space loss medial femorotibial  joint space as well as chondrocalcinosis.  No fracture of the medial  tibial plateau is seen.  The fibula appears preserved.  Degenerative  change patellofemoral joint particularly laterally.       Impression:      Lateral tibial plateau fracture with depression as described.     This report was finalized on 5/2/2025 8:05 PM by YANNA UMANZOR MD.       XR Femur 2 View Right [[id number]] Collected: 05/02/25 1748     Updated: 05/02/25 1807    " Narrative:      Exam: XR FEMUR 2 VW RIGHT-     History: Injury     Comparison: No images available     Findings:     No acute fracture or dislocation.     Soft tissue are normal.       Impression:      Impression:     No acute bony process of the femur.     This report was finalized on 5/2/2025 5:49 PM by Paco Soliman MD.       XR Tibia Fibula 2 View Right [[id number]] Collected: 05/02/25 1749     Updated: 05/02/25 1806    Narrative:      Exam: XR TIBIA FIBULA 2 VW RIGHT-     History: injury     Comparison: No images available     Findings:     Comminuted depressed lateral tibial plateau fracture. Question fibula  shaft fracture.      No other acute fracture or dislocation.     Soft tissue are normal.       Impression:      Impression:     Tibial plateau fracture with possible fibula shaft fracture.      This report was finalized on 5/2/2025 5:50 PM by Paco Soliman MD.       XR Ankle 3+ View Right [[id number]] Collected: 05/02/25 1750     Updated: 05/02/25 1805    Narrative:      Exam: XR ANKLE 3+ VW RIGHT-     History: injury     Comparison: No images available     Findings:     No acute fracture or dislocation.     Soft tissue are normal.       Impression:      Impression:     No acute bony process.     This report was finalized on 5/2/2025 5:50 PM by Paco Soliman MD.       XR Shoulder 2+ View Right [[id number]] Collected: 05/02/25 1746     Updated: 05/02/25 1749    Narrative:      Exam: XR SHOULDER 2+ VW RIGHT-     History: Injury     Comparison: No images available     Findings:     Severe subacromial space narrowing with chronic humeral neck deformity.      No acute fracture or dislocation.     Soft tissue are normal.       Impression:      Impression:     No acute bony process.     This report was finalized on 5/2/2025 5:47 PM by Paco Soliman MD.       XR Chest 1 View [[id number]] Collected: 05/02/25 1744     Updated: 05/02/25 1749    Narrative:      Procedure: Frontal view of chest obtained.      Comparison: 5/14/2019     History: weakness     Findings:     Bilateral lung reticulation.   Normal heart size and mediastinal contours.  Trachea is in midline position.  No edema or effusion is seen.  There is no evidence of pneumothorax.       Impression:         Bilateral lung reticulation which is new. Atypical infection versus  fibrosis.      This report was finalized on 5/2/2025 5:46 PM by Paco Soliman MD.                 Assessment:  Right lateral tibial plateau fracture      Plan:      The patient states she is unable to go home and will not be able to manage on her own.  Dr. Harrell will plan to do surgery on Tuesday, 5/6/2025.    Patient will be scheduled for right tibial plateau open reduction internal fixation.  The nature of the surgery and the possible risks involved were discussed with the patient.  Surgical orders have been placed and consent will be obtained.    Continue to hold Eliquis in anticipation for surgery.     Knee immobilzer and NWB to the right lower ext.     Pain control as needed.     Orthopedic surgery will continue to follow.       DAVID Dukes   05/04/25   12:52 EDT

## 2025-05-05 LAB
ANION GAP SERPL CALCULATED.3IONS-SCNC: 11.4 MMOL/L (ref 5–15)
BUN SERPL-MCNC: 8 MG/DL (ref 8–23)
BUN/CREAT SERPL: 11.8 (ref 7–25)
CALCIUM SPEC-SCNC: 8.1 MG/DL (ref 8.6–10.5)
CHLORIDE SERPL-SCNC: 106 MMOL/L (ref 98–107)
CO2 SERPL-SCNC: 20.6 MMOL/L (ref 22–29)
CREAT SERPL-MCNC: 0.68 MG/DL (ref 0.57–1)
DEPRECATED RDW RBC AUTO: 48.6 FL (ref 37–54)
EGFRCR SERPLBLD CKD-EPI 2021: 89.3 ML/MIN/1.73
ERYTHROCYTE [DISTWIDTH] IN BLOOD BY AUTOMATED COUNT: 13.7 % (ref 12.3–15.4)
GLUCOSE SERPL-MCNC: 144 MG/DL (ref 65–99)
HCT VFR BLD AUTO: 32.8 % (ref 34–46.6)
HGB BLD-MCNC: 10.3 G/DL (ref 12–15.9)
MCH RBC QN AUTO: 30.6 PG (ref 26.6–33)
MCHC RBC AUTO-ENTMCNC: 31.4 G/DL (ref 31.5–35.7)
MCV RBC AUTO: 97.3 FL (ref 79–97)
PLATELET # BLD AUTO: 202 10*3/MM3 (ref 140–450)
PMV BLD AUTO: 9.1 FL (ref 6–12)
POTASSIUM SERPL-SCNC: 3.6 MMOL/L (ref 3.5–5.2)
POTASSIUM SERPL-SCNC: 4.3 MMOL/L (ref 3.5–5.2)
RBC # BLD AUTO: 3.37 10*6/MM3 (ref 3.77–5.28)
SODIUM SERPL-SCNC: 138 MMOL/L (ref 136–145)
WBC NRBC COR # BLD AUTO: 9.71 10*3/MM3 (ref 3.4–10.8)

## 2025-05-05 PROCEDURE — 25810000003 SODIUM CHLORIDE 0.9 % SOLUTION: Performed by: INTERNAL MEDICINE

## 2025-05-05 PROCEDURE — 80048 BASIC METABOLIC PNL TOTAL CA: CPT | Performed by: INTERNAL MEDICINE

## 2025-05-05 PROCEDURE — 84132 ASSAY OF SERUM POTASSIUM: CPT | Performed by: INTERNAL MEDICINE

## 2025-05-05 PROCEDURE — 99232 SBSQ HOSP IP/OBS MODERATE 35: CPT | Performed by: INTERNAL MEDICINE

## 2025-05-05 PROCEDURE — 25810000003 SODIUM CHLORIDE 0.9 % SOLUTION 1,000 ML FLEX CONT: Performed by: INTERNAL MEDICINE

## 2025-05-05 PROCEDURE — 85027 COMPLETE CBC AUTOMATED: CPT | Performed by: INTERNAL MEDICINE

## 2025-05-05 RX ORDER — POTASSIUM CHLORIDE 1500 MG/1
40 TABLET, EXTENDED RELEASE ORAL EVERY 4 HOURS
Status: COMPLETED | OUTPATIENT
Start: 2025-05-05 | End: 2025-05-05

## 2025-05-05 RX ORDER — DIAZEPAM 5 MG/1
5 TABLET ORAL NIGHTLY
Status: DISCONTINUED | OUTPATIENT
Start: 2025-05-06 | End: 2025-05-09 | Stop reason: HOSPADM

## 2025-05-05 RX ADMIN — OXYCODONE 5 MG: 5 TABLET ORAL at 03:13

## 2025-05-05 RX ADMIN — ACETAMINOPHEN 1000 MG: 500 TABLET ORAL at 00:21

## 2025-05-05 RX ADMIN — POTASSIUM CHLORIDE 40 MEQ: 1500 TABLET, EXTENDED RELEASE ORAL at 03:13

## 2025-05-05 RX ADMIN — POTASSIUM CHLORIDE 40 MEQ: 1500 TABLET, EXTENDED RELEASE ORAL at 05:46

## 2025-05-05 RX ADMIN — METHOCARBAMOL 500 MG: 500 TABLET ORAL at 21:15

## 2025-05-05 RX ADMIN — Medication 10 ML: at 08:24

## 2025-05-05 RX ADMIN — SENNOSIDES AND DOCUSATE SODIUM 2 TABLET: 50; 8.6 TABLET ORAL at 21:16

## 2025-05-05 RX ADMIN — PANTOPRAZOLE SODIUM 40 MG: 40 TABLET, DELAYED RELEASE ORAL at 05:46

## 2025-05-05 RX ADMIN — OXYCODONE 5 MG: 5 TABLET ORAL at 21:15

## 2025-05-05 RX ADMIN — Medication 10 ML: at 21:16

## 2025-05-05 RX ADMIN — SODIUM CHLORIDE 40 ML: 9 INJECTION, SOLUTION INTRAVENOUS at 08:25

## 2025-05-05 RX ADMIN — FUROSEMIDE 20 MG: 20 TABLET ORAL at 08:19

## 2025-05-05 RX ADMIN — SOTALOL HYDROCHLORIDE 80 MG: 80 TABLET ORAL at 08:19

## 2025-05-05 RX ADMIN — SODIUM CHLORIDE 100 ML/HR: 9 INJECTION, SOLUTION INTRAVENOUS at 02:06

## 2025-05-05 RX ADMIN — SOTALOL HYDROCHLORIDE 80 MG: 80 TABLET ORAL at 21:15

## 2025-05-05 RX ADMIN — DIAZEPAM 5 MG: 5 TABLET ORAL at 08:19

## 2025-05-05 RX ADMIN — SENNOSIDES AND DOCUSATE SODIUM 2 TABLET: 50; 8.6 TABLET ORAL at 08:19

## 2025-05-05 NOTE — PROGRESS NOTES
Inpatient Progress Note  Johanna Street  Date: 05/05/25  MRN: [id number]      Subjective:  Resting in bed eating breakfast.  Notes pain to the right lower extremity is well controlled, no paraesthesias.  Notes the plan is for surgery tomorrow.  VSS.  No acute events overnight.      Objective:      Vitals:    05/04/25 1837 05/04/25 2300 05/05/25 0300 05/05/25 0650   BP: 105/65 125/73 131/63 118/63   BP Location: Right arm Right arm Right arm Right arm   Patient Position: Lying Lying Lying Lying   Pulse: 100 101 97    Resp: 20 20 20 20   Temp: 97.8 °F (36.6 °C) 97.9 °F (36.6 °C) 98.1 °F (36.7 °C)    TempSrc: Oral Oral Oral    SpO2:       Weight:       Height:              Physical Exam:  Constitutional:  Well-developed, well-nourished.  No acute distress.        Musculoskeletal:  Upon examination of the right lower extremity, there is a knee immobilizer in place.  No erythema or ecchymosis to the surrounding tissues, tissues are soft.  (+) DF/PF at the ankle.  Capillary refill is brisk and light touch sensation is intact, distally.  (+) DP pulse.     Labs:    Results from last 7 days   Lab Units 05/05/25  0015 05/03/25  0107 05/02/25  1633   WBC 10*3/mm3 9.71 13.41* 11.72*   HEMOGLOBIN g/dL 10.3* 12.1 13.7   HEMATOCRIT % 32.8* 38.0 44.1   MCV fL 97.3* 94.8 95.2   MCHC g/dL 31.4* 31.8 31.1*   PLATELETS 10*3/mm3 202 225 286         Results from last 7 days   Lab Units 05/05/25  0015 05/03/25  0107 05/02/25  1633   SODIUM mmol/L 138 131* 135*   POTASSIUM mmol/L 3.6 5.0 4.3   MAGNESIUM mg/dL  --   --  2.0   CHLORIDE mmol/L 106 96* 97*   CO2 mmol/L 20.6* 21.0* 21.7*   BUN mg/dL 8 11 11   CREATININE mg/dL 0.68 0.89 0.96   CALCIUM mg/dL 8.1* 9.1 9.7   GLUCOSE mg/dL 144* 138* 160*   ALBUMIN g/dL  --  3.5 4.2   BILIRUBIN mg/dL  --  1.4* 1.1   ALK PHOS U/L  --  109 120*   AST (SGOT) U/L  --  33* 20   ALT (SGPT) U/L  --  16 14   Estimated Creatinine Clearance: 65.9 mL/min (by C-G formula based on SCr of 0.68 mg/dL).  No results  "found for: \"AMMONIA\"          No results found for: \"HGBA1C\"  Lab Results   Component Value Date    TSH 4.760 (H) 05/02/2025         Pain Management Panel          Latest Ref Rng & Units 5/3/2025   Pain Management Panel   Amphetamine, Urine Qual Negative Negative    Barbiturates Screen, Urine Negative Negative    Benzodiazepine Screen, Urine Negative Positive    Buprenorphine, Screen, Urine Negative Negative    Cocaine Screen, Urine Negative Negative    Fentanyl, Urine Negative Negative    Methadone Screen , Urine Negative Negative    Methamphetamine, Ur Negative Negative        No results found for: \"BLOODCX\"  No results found for: \"URINECX\"  No results found for: \"WOUNDCX\"  No results found for: \"STOOLCX\"              Radiology:  Imaging Results (Last 72 Hours)       Procedure Component Value Units Date/Time    CT Lower Extremity Right Without Contrast [[id number]] Collected: 05/02/25 2004     Updated: 05/02/25 2007    Narrative:      REASON FOR EXAMINATION: Fall trauma pain.     COMPARISON: None available.     TECHNIQUE: CT of the right knee is performed on a multi-detector CT.  Coronal and sagittal reconstructions were obtained. CT Scan performed  according to as low as reasonably achievable dose protocol.     FINDINGS:  There is an impacted fracture of the lateral right tibial plateau.  Loss  of height is 16 mm image 55 of series 5.  Fracture line also extends at  least to the lateral margin of the the tibial spines image 53 of series  5.  There is osteoarthritis with joint space loss medial femorotibial  joint space as well as chondrocalcinosis.  No fracture of the medial  tibial plateau is seen.  The fibula appears preserved.  Degenerative  change patellofemoral joint particularly laterally.       Impression:      Lateral tibial plateau fracture with depression as described.     This report was finalized on 5/2/2025 8:05 PM by YANNA UMANZOR MD.       XR Femur 2 View Right [[id number]] Collected: 05/02/25 " 1748     Updated: 05/02/25 1807    Narrative:      Exam: XR FEMUR 2 VW RIGHT-     History: Injury     Comparison: No images available     Findings:     No acute fracture or dislocation.     Soft tissue are normal.       Impression:      Impression:     No acute bony process of the femur.     This report was finalized on 5/2/2025 5:49 PM by Paco Soliman MD.       XR Tibia Fibula 2 View Right [[id number]] Collected: 05/02/25 1749     Updated: 05/02/25 1806    Narrative:      Exam: XR TIBIA FIBULA 2 VW RIGHT-     History: injury     Comparison: No images available     Findings:     Comminuted depressed lateral tibial plateau fracture. Question fibula  shaft fracture.      No other acute fracture or dislocation.     Soft tissue are normal.       Impression:      Impression:     Tibial plateau fracture with possible fibula shaft fracture.      This report was finalized on 5/2/2025 5:50 PM by Paco Soliman MD.       XR Ankle 3+ View Right [[id number]] Collected: 05/02/25 1750     Updated: 05/02/25 1805    Narrative:      Exam: XR ANKLE 3+ VW RIGHT-     History: injury     Comparison: No images available     Findings:     No acute fracture or dislocation.     Soft tissue are normal.       Impression:      Impression:     No acute bony process.     This report was finalized on 5/2/2025 5:50 PM by Paco Soliman MD.       XR Shoulder 2+ View Right [[id number]] Collected: 05/02/25 1746     Updated: 05/02/25 1749    Narrative:      Exam: XR SHOULDER 2+ VW RIGHT-     History: Injury     Comparison: No images available     Findings:     Severe subacromial space narrowing with chronic humeral neck deformity.      No acute fracture or dislocation.     Soft tissue are normal.       Impression:      Impression:     No acute bony process.     This report was finalized on 5/2/2025 5:47 PM by Paco Soliman MD.       XR Chest 1 View [[id number]] Collected: 05/02/25 1744     Updated: 05/02/25 1749    Narrative:      Procedure:  Frontal view of chest obtained.     Comparison: 5/14/2019     History: weakness     Findings:     Bilateral lung reticulation.   Normal heart size and mediastinal contours.  Trachea is in midline position.  No edema or effusion is seen.  There is no evidence of pneumothorax.       Impression:         Bilateral lung reticulation which is new. Atypical infection versus  fibrosis.      This report was finalized on 5/2/2025 5:46 PM by Paco Soliman MD.                 Assessment:      Right lateral tibial plateau fracture, DOI: 5/2/25.    Questionable non displaced, right tibia shaft fracture, DOI: 5/2/25.          Plan:      This patient was discussed with Dr. Schwartz.  He plans for a right tibial plateau ORIF tomorrow.  Orders have previously been placed.    NPO after midnight for surgery tomorrow.    Continue the knee immobilizer to the right knee.    Non weight bearing to the right lower extremity.    Eliquis is on hold for surgery tomorrow.    Pain control.    Orthopedic surgery following.    DAVID Onofre   May 5, 2025   08:55 EDT

## 2025-05-05 NOTE — PAYOR COMM NOTE
"CONTACT: FLORIN CALVO RN  UTILIZATION MANAGEMENT DEPT.  [address] [address]  PHONE: [phone]  FAX: [phone]      INPATIENT AUTH REQUEST      PLACED IN OBSERVATION STATUS ON 5/2/25, TRANSITIONED TO INPATIENT ON 5/5/25    Essence Garcia (79 y.o. Female)       Date of Birth   1945    Social Security Number       Address   [address] [place] [address] [place], [place] [address]    Home Phone   [phone]    MRN   [id number]       [facility]    Marital Status                               Admission Date   5/5/2025    Admission Type   Emergency    Admitting Provider   Pia Wren DO    Attending Provider   Maurilio Patel DO    Department, Room/Bed   [address], 3335/1P       Discharge Date       Discharge Disposition       Discharge Destination                                 Attending Provider: Maurilio Patel DO    Allergies: Codeine, Diltiazem, Zinc, Levaquin [Levofloxacin], Penicillins    Isolation: None   Infection: None   Code Status: CPR    Ht: 154.9 cm (61\")   Wt: 81.2 kg (179 lb 0.2 oz)    Admission Cmt: None   Principal Problem: Tibial plateau fracture, right [S82.141A]                   Active Insurance as of 5/2/2025       Primary Coverage       Payor Plan Insurance Group Employer/Plan Group    ANTHEM MEDICARE REPLACEMENT ANTHEM MEDICARE ADVANTAGE PPO [id number]       Payor Plan Address Payor Plan Phone Number Payor Plan Fax Number Effective Dates    [address] [phone]  5/1/2024 - None Entered    [facility] [id number]         Subscriber Name Subscriber Birth Date Member ID       ESSENCE GARCIA 1945 [id number]                     Emergency Contacts        (Rel.) Home Phone Work Phone Mobile Phone    Rosanne Hargrove (Grandchild) [phone] -- --    MERE OMALLEY (Sister) [phone] -- --    Chapito Heard (Brother) -- -- [phone]                 History & Physical    "     Nida Cleaning, DAVID at 25 0847       Attestation signed by Pia Wren DO at 25 7413      I have reviewed this documentation, have written and agree. Left lateral tibial plateau fracture s/p mechanical fall/injury prior to admission, mild, leukocytosis, AGMA and PAF on Eliquis.     Hold eliquis pending further recommendations per Orthopedic Surgery. Consult SS as patient states unable to care for herself acutely. UA to r/o URI, CXR most likely consistent with scarring; procalcitonin level is negative and clinical presentation does not support acute pneumonia.  Will change LR to normal saline and continue for approximately 10 hours.                        [facility]  History & Physical    Patient Identification:  Name:  Johanna Street  Age:  79 y.o.  Sex:  female  :  1945  MRN:  [id number]   Visit Number:  [id number]  Admit Date: 2025   Primary Care Physician:  Bi Youssef MD    Subjective     Chief complaint: RLE Pain    History of presenting illness:      Johanna Street is a 79 y.o. female with past medical history significant for atrial fibrillation, chronic anticoagulation with Eliquis, hypertension, history of TIA, depression, anxiety, history of paraesophageal hernia, history of dysphagia, GERD, arthritis, scoliosis, and chronic back pain. Patient presented to [facility] Emergency Department secondary to injury to her right lower extremity. Patient was getting into her car. She states her leg gave out. She slammed leg between the door and the car. She states that she never did actually fall onto the ground and did not hit her head. Also denies loss of consciousness. Denies associated or precipitating symptoms such as dizziness, chest pain, or shortness of breath. Patient does admit that she's been feeling fatigued recently. States that she has a difficult time using her right arm/right shoulder at baseline due to a remote  injury. She states that she broke her right shoulder several years ago and never did have surgery to repair it. Patient states that she lives alone. ED workup positive for right tibial plateau fracture. ED provider discussed with Orthopedic Surgery (Dr. Harrell) who recommended a knee immobilizer and follow-up outpatient for further management. However, patient stated that she would be unable to take care of herself at home and was concerned that she would experience a fall. Family member at bedside stated that patient could not stay with her as she lives 2 counties away and was unable to provide the help that patient would need. Patient confirms that she does take Eliquis for Afib. Last dose of Eliquis was on the morning of 5/2/2025. She states that she is compliant with all her home medications. Denies smoking, alcohol abuse, or illicit substance use. Discussed admission plans with patient. She voiced agreement and understanding with no further questions or concerns at this time.     Upon arrival to the ED, vital signs were temperature 98.2, pulse 106, respirations 16, blood pressure 140/87 sitting, SpO2 saturation 95% on room air. CMP with sodium 135, chloride 97, CO2 21.7, anion gap 16.3, glucose 160, alkaline phosphatase 120, otherwise unremarkable.  Pro-Sorin negative.  CBC with WBCs 11.72, MCHC 31.1, otherwise unremarkable.  X-ray of the right ankle unremarkable.  Chest x-ray noted bilateral lung reticulation.  Atypical infection versus fibrosis.  X-ray of the right femur unremarkable.  X-ray of the right shoulder unremarkable.  X-ray of the right tib-fib noted tibial plateau fracture with possible fibular shaft fracture.  CT of the right lower extremity without contrast noted lateral tibial plateau fracture with depression.    Known Emergency Department medications received prior to my evaluation included 5 mg oral Valium, 2 mg IV morphine, 80 mg oral sotalol. Emergency Department Room location at the time of  my evaluation was 114. Discussed with admitting physician, Pia Larson DO.      ---------------------------------------------------------------------------------------------------------------------   Review of Systems   Constitutional:  Negative for fever.   HENT: Negative.     Respiratory: Negative.     Cardiovascular: Negative.  Negative for chest pain.   Gastrointestinal: Negative.  Negative for abdominal pain.   Endocrine: Negative.    Genitourinary: Negative.  Negative for dysuria.   Musculoskeletal:  Positive for arthralgias and gait problem.   Skin: Negative.    Neurological:  Positive for weakness (generalized).   Psychiatric/Behavioral: Negative.     All other systems reviewed and are negative.    ---------------------------------------------------------------------------------------------------------------------   Past Medical History:   Diagnosis Date    Ankle edema     Anxiety     Arthritis     Atrial fibrillation     Back pain     Depression     Hernia, hiatal     PAF (paroxysmal atrial fibrillation)      Past Surgical History:   Procedure Laterality Date    APPENDECTOMY      CARDIAC CATHETERIZATION  12/2013    CHOLECYSTECTOMY      COLON SURGERY      COLONOSCOPY  2006    ENDOSCOPY N/A 12/7/2017    Procedure: ESOPHAGOGASTRODUODENOSCOPY WITH ANESTHESIA;  Surgeon: Eliseo Moon MD;  Location: [facility] OR;  Service:     ENDOSCOPY N/A 2/21/2018    Procedure: ESOPHAGOGASTRODUODENOSCOPY WITH DILATATION CPT CODE: 09759;  Surgeon: Tomi Dey III, MD;  Location: [facility] OR;  Service:     EYE SURGERY      HYSTERECTOMY      NERVE REPAIR      TONSILLECTOMY       Family History   Problem Relation Age of Onset    Heart attack Mother     Stroke Mother     Coronary artery disease Mother     Diabetes Mother     Hypertension Mother     Heart disease Father     Arthritis Father     Coronary artery disease Father     Liver disease Brother      Social History     Socioeconomic History    Marital status:     Tobacco Use    Smoking status: Never    Smokeless tobacco: Never   Vaping Use    Vaping status: Never Used   Substance and Sexual Activity    Alcohol use: No    Drug use: No    Sexual activity: Defer     ---------------------------------------------------------------------------------------------------------------------   Allergies:  Codeine, Diltiazem, Zinc, Levaquin [levofloxacin], and Penicillins  ---------------------------------------------------------------------------------------------------------------------   Home medications:    Medications below are reported home medications pulling from within the system; at this time, these medications have not been reconciled unless otherwise specified and are in the verification process for further verifcation as current home medications.  Medications Prior to Admission   Medication Sig Dispense Refill Last Dose/Taking    apixaban (ELIQUIS) 5 MG tablet tablet Take 1 tablet by mouth Every 12 (Twelve) Hours. 180 tablet 3 5/2/2025 Morning    diazepam (VALIUM) 5 MG tablet Take 1 tablet by mouth Daily.   5/2/2025    furosemide (LASIX) 20 MG tablet Take 1 tablet by mouth Daily.   5/2/2025    lansoprazole (PREVACID) 15 MG capsule Take 1 capsule by mouth Daily.   5/2/2025    sotalol (BETAPACE) 80 MG tablet Take 1 tablet by mouth 2 (Two) Times a Day.   5/2/2025 Evening    divalproex (DEPAKOTE) 125 MG DR tablet Take 1 tablet by mouth Daily As Needed (Headache).   Unknown       Hospital Scheduled Meds:  acetaminophen, 1,000 mg, Oral, Q8H  senna-docusate sodium, 2 tablet, Oral, BID  sodium chloride, 10 mL, Intravenous, Q12H      lactated ringers, 75 mL/hr, Last Rate: 75 mL/hr (05/03/25 0039)        Current listed hospital scheduled medications may not yet reflect those currently placed in orders that are signed and held awaiting patient's arrival to floor.    ---------------------------------------------------------------------------------------------------------------------     Objective     Vital Signs:  Temp:  [98.2 °F (36.8 °C)] 98.2 °F (36.8 °C)  Heart Rate:  [] 105  Resp:  [16-18] 18  BP: ()/(60-97) 134/79      05/02/25  1545 05/03/25  0002   Weight: 78.5 kg (173 lb) 81.2 kg (179 lb 0.2 oz)     Body mass index is 33.82 kg/m².  ---------------------------------------------------------------------------------------------------------------------       Physical Exam  Vitals reviewed.   Constitutional:       General: She is awake. She is not in acute distress.     Appearance: She is ill-appearing (chronically). She is not diaphoretic.   HENT:      Head: Normocephalic and atraumatic.      Mouth/Throat:      Mouth: Mucous membranes are dry.   Eyes:      Extraocular Movements: Extraocular movements intact.      Pupils: Pupils are equal, round, and reactive to light.   Cardiovascular:      Rate and Rhythm: Normal rate. Rhythm irregular.      Pulses: Normal pulses.           Dorsalis pedis pulses are 1+ on the right side and 1+ on the left side.      Heart sounds:      No friction rub.   Pulmonary:      Effort: Pulmonary effort is normal. No accessory muscle usage, respiratory distress or retractions.      Breath sounds: No wheezing, rhonchi or rales.   Abdominal:      General: Bowel sounds are normal. There is no distension.      Palpations: Abdomen is soft.      Tenderness: There is no abdominal tenderness. There is no guarding.   Musculoskeletal:      Cervical back: Neck supple. No rigidity.      Right lower leg: No edema.      Left lower leg: No edema.      Comments: RLE neurovascularly intact.    Skin:     General: Skin is warm and dry.      Capillary Refill: Capillary refill takes 2 to 3 seconds.      Coloration: Skin is pale.   Neurological:      Mental Status: She is alert and oriented to person, place, and time. Mental status is at baseline.       "Cranial Nerves: No dysarthria or facial asymmetry.      Sensory: Sensation is intact.      Motor: Weakness (generalized) present. No tremor.   Psychiatric:         Attention and Perception: Attention normal.         Mood and Affect: Mood normal.         Speech: Speech normal.         Behavior: Behavior normal. Behavior is cooperative.         Thought Content: Thought content normal.         Cognition and Memory: Cognition normal.         Judgment: Judgment normal.       ---------------------------------------------------------------------------------------------------------------------  EKG:  Appearing Afib with controlled rate in the 90s. Fascicular block. LVH. Confirmed by Cardiology, Dr. Spears.     Telemetry:  Patient was not on bedside monitor in  during my evaluation.   ---------------------------------------------------------------------------------------------------------------------   Results from last 7 days   Lab Units 05/02/25  1633   WBC 10*3/mm3 11.72*   HEMOGLOBIN g/dL 13.7   HEMATOCRIT % 44.1   MCV fL 95.2   MCHC g/dL 31.1*   PLATELETS 10*3/mm3 286         Results from last 7 days   Lab Units 05/02/25  1633   SODIUM mmol/L 135*   POTASSIUM mmol/L 4.3   CHLORIDE mmol/L 97*   CO2 mmol/L 21.7*   BUN mg/dL 11   CREATININE mg/dL 0.96   CALCIUM mg/dL 9.7   GLUCOSE mg/dL 160*   ALBUMIN g/dL 4.2   BILIRUBIN mg/dL 1.1   ALK PHOS U/L 120*   AST (SGOT) U/L 20   ALT (SGPT) U/L 14   Estimated Creatinine Clearance: 46.7 mL/min (by C-G formula based on SCr of 0.96 mg/dL).  No results found for: \"AMMONIA\"          Lab Results   Component Value Date    HGBA1C 5.50 08/31/2017     Lab Results   Component Value Date    TSH 2.120 05/04/2019     No results found for: \"PREGTESTUR\", \"PREGSERUM\", \"HCG\", \"HCGQUANT\"  Pain Management Panel           No data to display                  ---------------------------------------------------------------------------------------------------------------------  Imaging Results " (Last 7 Days)       Procedure Component Value Units Date/Time    CT Lower Extremity Right Without Contrast [[id number]] Collected: 05/02/25 2004     Updated: 05/02/25 2007    Narrative:      REASON FOR EXAMINATION: Fall trauma pain.     COMPARISON: None available.     TECHNIQUE: CT of the right knee is performed on a multi-detector CT.  Coronal and sagittal reconstructions were obtained. CT Scan performed  according to as low as reasonably achievable dose protocol.     FINDINGS:  There is an impacted fracture of the lateral right tibial plateau.  Loss  of height is 16 mm image 55 of series 5.  Fracture line also extends at  least to the lateral margin of the the tibial spines image 53 of series  5.  There is osteoarthritis with joint space loss medial femorotibial  joint space as well as chondrocalcinosis.  No fracture of the medial  tibial plateau is seen.  The fibula appears preserved.  Degenerative  change patellofemoral joint particularly laterally.       Impression:      Lateral tibial plateau fracture with depression as described.     This report was finalized on 5/2/2025 8:05 PM by YANNA UMANZOR MD.       XR Femur 2 View Right [[id number]] Collected: 05/02/25 1748     Updated: 05/02/25 1807    Narrative:      Exam: XR FEMUR 2 VW RIGHT-     History: Injury     Comparison: No images available     Findings:     No acute fracture or dislocation.     Soft tissue are normal.       Impression:      Impression:     No acute bony process of the femur.     This report was finalized on 5/2/2025 5:49 PM by Paco Soliman MD.       XR Tibia Fibula 2 View Right [[id number]] Collected: 05/02/25 1749     Updated: 05/02/25 1806    Narrative:      Exam: XR TIBIA FIBULA 2 VW RIGHT-     History: injury     Comparison: No images available     Findings:     Comminuted depressed lateral tibial plateau fracture. Question fibula  shaft fracture.      No other acute fracture or dislocation.     Soft tissue are normal.        Impression:      Impression:     Tibial plateau fracture with possible fibula shaft fracture.      This report was finalized on 5/2/2025 5:50 PM by Paco Soliman MD.       XR Ankle 3+ View Right [[id number]] Collected: 05/02/25 1750     Updated: 05/02/25 1805    Narrative:      Exam: XR ANKLE 3+ VW RIGHT-     History: injury     Comparison: No images available     Findings:     No acute fracture or dislocation.     Soft tissue are normal.       Impression:      Impression:     No acute bony process.     This report was finalized on 5/2/2025 5:50 PM by Paco Soliman MD.       XR Shoulder 2+ View Right [[id number]] Collected: 05/02/25 1746     Updated: 05/02/25 1749    Narrative:      Exam: XR SHOULDER 2+ VW RIGHT-     History: Injury     Comparison: No images available     Findings:     Severe subacromial space narrowing with chronic humeral neck deformity.      No acute fracture or dislocation.     Soft tissue are normal.       Impression:      Impression:     No acute bony process.     This report was finalized on 5/2/2025 5:47 PM by Paco Soliman MD.       XR Chest 1 View [[id number]] Collected: 05/02/25 1744     Updated: 05/02/25 1749    Narrative:      Procedure: Frontal view of chest obtained.     Comparison: 5/14/2019     History: weakness     Findings:     Bilateral lung reticulation.   Normal heart size and mediastinal contours.  Trachea is in midline position.  No edema or effusion is seen.  There is no evidence of pneumothorax.       Impression:         Bilateral lung reticulation which is new. Atypical infection versus  fibrosis.      This report was finalized on 5/2/2025 5:46 PM by Paco Soliman MD.             Last echocardiogram: No previous echo on file.     I have personally reviewed the above radiology images and read the final radiology report on 05/03/25  ---------------------------------------------------------------------------------------------------------------------  Assessment / Plan      Active Hospital Problems    Diagnosis  POA    **Tibial plateau fracture, right [S82.602K]  Yes       ASSESSMENT/PLAN:  #Acute, lateral tibial plateau fracture status post mechanical fall prior to arrival   #Mild leukocytosis (POA), likely reactive to fracture  #Anion gap metabolic acidosis (POA)  --Radiological images reviewed.   --Orthopedic surgery consulted, input/assistance is much appreciated.   --PRN IV/PO pain medication available with holding parameters to prevent hypotension, oversedation, and/or respiratory depression.   --Scheduled high-dose Tylenol.  --Neurovascular checks Q 4 hours.   --Fall precautions.   --PT/OT consults placed.   --Case management consulted for assistance with discharge plans. Greatly appreciate their assistance.   --LR infusion at 75 mL an hour x 10 hours per attending.  --Repeat labs in the a.m. (CBC and CMP).    #Atrial fibrillation; EKG obtained on arrival revealed A-fib with controlled rate.  Potassium 4.3.  Obtain magnesium.  Replace electrolytes per protocol as necessary.  Check TSH.  Continuous cardiac monitoring ordered.  Review home rate/rhythm controlling agents once reconciled by pharmacy with plans to continue.  Holding parameters to prevent hypotension and/or bradycardia.  #Chronic anticoagulation with Eliquis; hold Eliquis for now pending formal evaluation by orthopedic surgery.  #Hypertension; BP appears overall stable throughout ED course.  Review home antihypertensive regimen once reconciled by pharmacy with plans to continue.  Holding parameters to prevent hypotension and/or bradycardia.  #History of TIA; no acute focal neurological deficits appreciated on exam.  Patient denies headache or dizziness.  #Depression and anxiety; supportive care.  #History of paraesophageal hernia  #History of dysphagia; SLP consult placed for diet recommendations.  Maintain aspiration precautions.  #GERD; reflux precautions.  PPI.  #Arthritis, scoliosis, and chronic back pain;  supportive care.      ----------  -DVT prophylaxis: SCD, Left Leg Only.  Holding Eliquis for now pending formal evaluation by orthopedic surgery.  Last dose of Eliquis reportedly on the morning of 5/2/2025.  -Activity: Bedrest for now. Pending Orthopedic Surgery evaluation.   -Expected length of stay:   OBSERVATION status; however, if further evaluation or treatment plans warrant, status may change.  Based upon current information, I predict patient's care encounter to be less than or equal to 2 midnights   -Disposition will likely require SNF placement. Case management consulted for assistance with discharge plans (as noted above).     High risk secondary to acute lateral tibial plateau fracture status post mechanical fall PTA, mild leukocytosis, and anion gap metabolic acidosis.     CODE STATUS: FULL CODE      Nida CleaningDAVID   05/03/25  01:21 EDT  Pager #[phone]  ---------------------------------------------------------------------------------------------------------------------        Electronically signed by Pia Wren DO at 05/03/25 0533          Emergency Department Notes        Starla Alonso PA-C at 05/02/25 1607       Attestation signed by Flavia St DO at 05/03/25 0859          SUPERVISE: For this patient encounter, I reviewed the APC's documentation, treatment plan, and medical decision making.  Flavia St DO 5/3/2025 08:59 EDT                             Subjective   History of Present Illness  79-year-old female presents secondary to injury to her right lower extremity and right shoulder.  Patient was getting into her car.  She states her leg gave out.  She slammed between the door and the car.  She denies loss of consciousness.  Patient has been feeling fatigued recently.  No loss of consciousness.  Patient has a past medical history of paroxysmal A-fib, depression, anxiety, arthritis.  Patient presented by private vehicle.         Review of Systems   Constitutional:  Negative.  Negative for fever.   HENT: Negative.     Respiratory: Negative.     Cardiovascular: Negative.  Negative for chest pain.   Gastrointestinal: Negative.  Negative for abdominal pain.   Endocrine: Negative.    Genitourinary: Negative.  Negative for dysuria.   Skin: Negative.    Neurological: Negative.    Psychiatric/Behavioral: Negative.     All other systems reviewed and are negative.      Past Medical History:   Diagnosis Date    Ankle edema     Anxiety     Arthritis     Atrial fibrillation     Back pain     Depression     Hernia, hiatal     PAF (paroxysmal atrial fibrillation)        Allergies   Allergen Reactions    Codeine Anaphylaxis    Diltiazem Anaphylaxis    Zinc Hives and Swelling    Levaquin [Levofloxacin] Rash    Penicillins Rash       Past Surgical History:   Procedure Laterality Date    APPENDECTOMY      CARDIAC CATHETERIZATION  12/2013    CHOLECYSTECTOMY      COLON SURGERY      COLONOSCOPY  2006    ENDOSCOPY N/A 12/7/2017    Procedure: ESOPHAGOGASTRODUODENOSCOPY WITH ANESTHESIA;  Surgeon: Eliseo Moon MD;  Location: [facility];  Service:     ENDOSCOPY N/A 2/21/2018    Procedure: ESOPHAGOGASTRODUODENOSCOPY WITH DILATATION CPT CODE: 28417;  Surgeon: Tomi Dey III, MD;  Location: [facility];  Service:     EYE SURGERY      HYSTERECTOMY      NERVE REPAIR      TONSILLECTOMY         Family History   Problem Relation Age of Onset    Heart attack Mother     Stroke Mother     Coronary artery disease Mother     Diabetes Mother     Hypertension Mother     Heart disease Father     Arthritis Father     Coronary artery disease Father     Liver disease Brother        Social History     Socioeconomic History    Marital status:    Tobacco Use    Smoking status: Never    Smokeless tobacco: Never   Substance and Sexual Activity    Alcohol use: No    Drug use: No    Sexual activity: Defer           Objective   Physical Exam  Vitals and nursing note reviewed.   Constitutional:       General:  She is not in acute distress.     Appearance: She is well-developed. She is not diaphoretic.   HENT:      Head: Normocephalic and atraumatic.      Right Ear: External ear normal.      Left Ear: External ear normal.      Nose: Nose normal.   Eyes:      Conjunctiva/sclera: Conjunctivae normal.      Pupils: Pupils are equal, round, and reactive to light.   Neck:      Vascular: No JVD.      Trachea: No tracheal deviation.   Cardiovascular:      Rate and Rhythm: Normal rate and regular rhythm.      Heart sounds: Normal heart sounds. No murmur heard.  Pulmonary:      Effort: Pulmonary effort is normal. No respiratory distress.      Breath sounds: Normal breath sounds. No wheezing.   Abdominal:      General: Bowel sounds are normal.      Palpations: Abdomen is soft.      Tenderness: There is no abdominal tenderness.   Musculoskeletal:         General: No deformity. Normal range of motion.      Cervical back: Normal range of motion and neck supple.   Skin:     General: Skin is warm and dry.      Coloration: Skin is not pale.      Findings: No erythema or rash.   Neurological:      Mental Status: She is alert and oriented to person, place, and time.      Cranial Nerves: No cranial nerve deficit.   Psychiatric:         Behavior: Behavior normal.         Thought Content: Thought content normal.       Results for orders placed or performed during the hospital encounter of 05/02/25   Comprehensive Metabolic Panel    Collection Time: 05/02/25  4:33 PM    Specimen: Blood   Result Value Ref Range    Glucose 160 (H) 65 - 99 mg/dL    BUN 11 8 - 23 mg/dL    Creatinine 0.96 0.57 - 1.00 mg/dL    Sodium 135 (L) 136 - 145 mmol/L    Potassium 4.3 3.5 - 5.2 mmol/L    Chloride 97 (L) 98 - 107 mmol/L    CO2 21.7 (L) 22.0 - 29.0 mmol/L    Calcium 9.7 8.6 - 10.5 mg/dL    Total Protein 7.6 6.0 - 8.5 g/dL    Albumin 4.2 3.5 - 5.2 g/dL    ALT (SGPT) 14 1 - 33 U/L    AST (SGOT) 20 1 - 32 U/L    Alkaline Phosphatase 120 (H) 39 - 117 U/L    Total  Bilirubin 1.1 0.0 - 1.2 mg/dL    Globulin 3.4 gm/dL    A/G Ratio 1.2 g/dL    BUN/Creatinine Ratio 11.5 7.0 - 25.0    Anion Gap 16.3 (H) 5.0 - 15.0 mmol/L    eGFR 60.7 >60.0 mL/min/1.73   CBC Auto Differential    Collection Time: 05/02/25  4:33 PM    Specimen: Blood   Result Value Ref Range    WBC 11.72 (H) 3.40 - 10.80 10*3/mm3    RBC 4.63 3.77 - 5.28 10*6/mm3    Hemoglobin 13.7 12.0 - 15.9 g/dL    Hematocrit 44.1 34.0 - 46.6 %    MCV 95.2 79.0 - 97.0 fL    MCH 29.6 26.6 - 33.0 pg    MCHC 31.1 (L) 31.5 - 35.7 g/dL    RDW 13.3 12.3 - 15.4 %    RDW-SD 46.6 37.0 - 54.0 fl    MPV 8.8 6.0 - 12.0 fL    Platelets 286 140 - 450 10*3/mm3    Neutrophil % 72.6 42.7 - 76.0 %    Lymphocyte % 18.2 (L) 19.6 - 45.3 %    Monocyte % 4.4 (L) 5.0 - 12.0 %    Eosinophil % 3.6 0.3 - 6.2 %    Basophil % 0.9 0.0 - 1.5 %    Immature Grans % 0.3 0.0 - 0.5 %    Neutrophils, Absolute 8.53 (H) 1.70 - 7.00 10*3/mm3    Lymphocytes, Absolute 2.13 0.70 - 3.10 10*3/mm3    Monocytes, Absolute 0.51 0.10 - 0.90 10*3/mm3    Eosinophils, Absolute 0.42 (H) 0.00 - 0.40 10*3/mm3    Basophils, Absolute 0.10 0.00 - 0.20 10*3/mm3    Immature Grans, Absolute 0.03 0.00 - 0.05 10*3/mm3    nRBC 0.0 0.0 - 0.2 /100 WBC   Green Top (Gel)    Collection Time: 05/02/25  4:33 PM   Result Value Ref Range    Extra Tube Hold for add-ons.    Lavender Top    Collection Time: 05/02/25  4:33 PM   Result Value Ref Range    Extra Tube hold for add-on    Gold Top - SST    Collection Time: 05/02/25  4:33 PM   Result Value Ref Range    Extra Tube Hold for add-ons.    Light Blue Top    Collection Time: 05/02/25  4:33 PM   Result Value Ref Range    Extra Tube Hold for add-ons.    ECG 12 Lead Tachycardia    Collection Time: 05/02/25  5:57 PM   Result Value Ref Range    QT Interval 390 ms    QTC Interval 477 ms     CT Lower Extremity Right Without Contrast  Result Date: 5/2/2025  Lateral tibial plateau fracture with depression as described.  This report was finalized on 5/2/2025 8:05 PM  by YANNA UMANZOR MD.      XR Femur 2 View Right  Result Date: 5/2/2025  Impression:  No acute bony process of the femur.  This report was finalized on 5/2/2025 5:49 PM by Paco Soliman MD.      XR Tibia Fibula 2 View Right  Result Date: 5/2/2025  Impression:  Tibial plateau fracture with possible fibula shaft fracture.  This report was finalized on 5/2/2025 5:50 PM by Paco Soliman MD.      XR Ankle 3+ View Right  Result Date: 5/2/2025  Impression:  No acute bony process.  This report was finalized on 5/2/2025 5:50 PM by Paco Soliman MD.      XR Shoulder 2+ View Right  Result Date: 5/2/2025  Impression:  No acute bony process.  This report was finalized on 5/2/2025 5:47 PM by Paco Soliman MD.      XR Chest 1 View  Result Date: 5/2/2025   Bilateral lung reticulation which is new. Atypical infection versus fibrosis.  This report was finalized on 5/2/2025 5:46 PM by Paco Soliman MD.          Procedures          ED Course  ED Course as of 05/02/25 2306   Fri May 02, 2025   2207 Spoke with Dr. Guevara who states the patient can wear a knee immobilizer and follow-up outpatient, however, she lives alone and has no family close by.  Patient states she is unable to care for herself at home without risk of falling.  Will discuss with hospitalist to see if the patient would meet inpatient criteria with potential to be discharged to a rehab facility.   [BD]   2305 With hospitalist admitting physician, Dr. Wren, who is agreeable to admit the patient for observation [BD]      ED Course User Index  [BD] Starla Alonso PA-C                                             Electronically signed by SRINIVASA Thomas, 05/02/25, 8:05 PM EDT.            Medical Decision Making  79-year-old female presents secondary to injury to her right lower extremity and right shoulder.  Patient was getting into her car.  She states her leg gave out.  She slammed between the door and the car.  She denies loss of consciousness.   Patient has been feeling fatigued recently.  No loss of consciousness.  Patient has a past medical history of paroxysmal A-fib, depression, anxiety, arthritis.  Patient presented by private vehicle.     Amount and/or Complexity of Data Reviewed  Labs: ordered. Decision-making details documented in ED Course.  Radiology: ordered. Decision-making details documented in ED Course.  ECG/medicine tests: ordered.    Risk  Prescription drug management.  Decision regarding hospitalization.        Final diagnoses:   None       ED Disposition  ED Disposition       ED Disposition   Decision to Admit    Condition   --    Comment   Level of Care: Medical Telemetry [23]   Diagnosis: Tibial plateau fracture, right [[id number]]   Admitting Physician: JOCELYNE LEE [[id number]]                 No follow-up provider specified.       Medication List      No changes were made to your prescriptions during this visit.            Starla Alonso PA-C  05/02/25 2306      Electronically signed by Flavia St DO at 05/03/25 0859       Facility-Administered Medications as of 5/5/2025   Medication Dose Route Frequency Provider Last Rate Last Admin    acetaminophen (TYLENOL) tablet 1,000 mg  1,000 mg Oral Q8H Jocelyne Lee DO   1,000 mg at 05/05/25 0021    sennosides-docusate (PERICOLACE) 8.6-50 MG per tablet 2 tablet  2 tablet Oral BID Jocelyne Lee DO   2 tablet at 05/05/25 0819    And    polyethylene glycol (MIRALAX) packet 17 g  17 g Oral Daily PRN Jocelyne Lee DO        And    bisacodyl (DULCOLAX) EC tablet 5 mg  5 mg Oral Daily PRN Jocelyne Lee DO        And    bisacodyl (DULCOLAX) suppository 10 mg  10 mg Rectal Daily PRN Jocelyne Lee DO        dextrose (D50W) (25 g/50 mL) IV injection 25 g  25 g Intravenous Q15 Min PRN Jocelyne Lee DO        dextrose (GLUTOSE) oral gel 15 g  15 g Oral Q15 Min PRN Jocelyne Lee DO        [COMPLETED] diazePAM (VALIUM) tablet 5 mg  5 mg  Oral Once Flavia St DO   5 mg at 05/02/25 1738    diazePAM (VALIUM) tablet 5 mg  5 mg Oral Daily Matheus Lovett DO   5 mg at 05/05/25 0819    furosemide (LASIX) tablet 20 mg  20 mg Oral Daily Matheus Lovett DO   20 mg at 05/05/25 0819    glucagon HCl (Diagnostic) injection 1 mg  1 mg Subcutaneous Q15 Min PRN Pia Wren DO        Magnesium Standard Dose Replacement - Follow Nurse / BPA Driven Protocol   Not Applicable PRN Nida Cleaning, APRN        methocarbamol (ROBAXIN) tablet 500 mg  500 mg Oral Q8H PRN Pia Wren DO        morphine injection 1 mg  1 mg Intravenous Q3H PRN Pia Wren DO        [COMPLETED] morphine injection 2 mg  2 mg Intravenous Once Flavia St DO   2 mg at 05/02/25 2059    nitroglycerin (NITROSTAT) SL tablet 0.4 mg  0.4 mg Sublingual Q5 Min PRN Pia Wren DO        oxyCODONE (ROXICODONE) immediate release tablet 5 mg  5 mg Oral Q8H PRN Nida Cleaning, APRN   5 mg at 05/05/25 0313    pantoprazole (PROTONIX) EC tablet 40 mg  40 mg Oral Q AM Matheus Lovett DO   40 mg at 05/05/25 0546    [COMPLETED] potassium chloride (KLOR-CON M20) CR tablet 40 mEq  40 mEq Oral Q4H Matheus Lovett DO   40 mEq at 05/05/25 0546    Potassium Replacement - Follow Nurse / BPA Driven Protocol   Not Applicable PRN Nida Cleaning, APRN        sodium chloride 0.9 % flush 10 mL  10 mL Intravenous Q12H Pia Wren DO   10 mL at 05/05/25 0824    sodium chloride 0.9 % flush 10 mL  10 mL Intravenous PRN Pia Wren DO        sodium chloride 0.9 % infusion 40 mL  40 mL Intravenous PRN Pia Wren DO   40 mL at 05/05/25 0825    [COMPLETED] sotalol (BETAPACE) tablet 80 mg  80 mg Oral Once Flavia St DO   80 mg at 05/02/25 2113    sotalol (BETAPACE) tablet 80 mg  80 mg Oral BID Matheus Lovett DO   80 mg at 05/05/25 0819     Orders (all)        Start     Ordered    05/06/25 0600  Basic Metabolic  Panel  Morning Draw         05/05/25 0816    05/06/25 0600  CBC & Differential  Morning Draw         05/05/25 0816    05/05/25 1029  Potassium  Timed         05/05/25 0128    05/05/25 0829  Inpatient Admission  Once         05/05/25 0829    05/05/25 0600  CBC (No Diff)  Morning Draw         05/04/25 1154    05/05/25 0600  Basic Metabolic Panel  Morning Draw         05/04/25 1154    05/05/25 0215  potassium chloride (KLOR-CON M20) CR tablet 40 mEq  Every 4 Hours         05/05/25 0128    05/04/25 2250  ECG 12 Lead Drug Monitoring  Once         05/04/25 2054 05/04/25 1954  POC Glucose Once  PROCEDURE ONCE        Comments: Complete no more than 45 minutes prior to patient eating      05/04/25 1948 05/04/25 1415  Case request  Once         05/04/25 1415    05/04/25 0600  pantoprazole (PROTONIX) EC tablet 40 mg  Every Early Morning         05/03/25 1129    05/03/25 2326  ECG 12 Lead QT Measurement  Once         05/03/25 2151    05/03/25 1400  ECG 12 Lead Drug Monitoring; Betapace  Once         05/03/25 1217    05/03/25 1215  diazePAM (VALIUM) tablet 5 mg  Daily         05/03/25 1129    05/03/25 1215  sotalol (BETAPACE) tablet 80 mg  2 Times Daily         05/03/25 1129    05/03/25 1215  furosemide (LASIX) tablet 20 mg  Daily         05/03/25 1129    05/03/25 0800  Oral Care  2 Times Daily       05/02/25 2335    05/03/25 0702  Inpatient Orthopedic Surgery Consult  IN AM        Comments: ED provider spoke to dr harrell   Specialty:  Orthopedic Surgery  Provider:  Bon Harrell DO    05/02/25 2335    05/03/25 0630  sodium chloride 0.9 % infusion  Continuous,   Status:  Discontinued         05/03/25 0531    05/03/25 0600  CBC & Differential  Morning Draw         05/02/25 2335    05/03/25 0600  Comprehensive Metabolic Panel  Morning Draw         05/02/25 2335    05/03/25 0600  Vitamin D,25-Hydroxy  Morning Draw         05/02/25 2335    05/03/25 0600  CBC Auto Differential  PROCEDURE ONCE         05/02/25 3875     "05/03/25 0600  Fentanyl, Urine - Straight Cath  Once         05/03/25 0559    05/03/25 0532  Urinalysis With Culture If Indicated - Straight Cath  Once         05/03/25 0531    05/03/25 0400  Neurovascular Checks  Every 4 Hours       05/03/25 0153    05/03/25 0154  Continuous Pulse Oximetry  Continuous         05/03/25 0153    05/03/25 0153  Reflux Precautions  Continuous         05/03/25 0152    05/03/25 0153  TSH  Once         05/03/25 0152    05/03/25 0153  Magnesium  STAT         05/03/25 0152    05/03/25 0152  Potassium Replacement - Follow Nurse / BPA Driven Protocol  As Needed         05/03/25 0152    05/03/25 0152  Magnesium Standard Dose Replacement - Follow Nurse / BPA Driven Protocol  As Needed         05/03/25 0152    05/03/25 0151  oxyCODONE (ROXICODONE) immediate release tablet 5 mg  Every 8 Hours PRN         05/03/25 0151    05/03/25 0150  oxyCODONE-acetaminophen (PERCOCET) 5-325 MG per tablet 1 tablet  Every 8 Hours PRN,   Status:  Discontinued         05/03/25 0150 05/03/25 0150  SLP Consult: Eval & Treat Other; Hx of Dysphagia  Once         05/03/25 0149    05/03/25 0150  Aspiration Precautions  Continuous         05/03/25 0149    05/03/25 0150  Fall Precautions  Continuous         05/03/25 0149    05/03/25 0150  Place Sequential Compression Device  Once        Comments: Left leg only.    05/03/25 0149    05/03/25 0150  Maintain Sequential Compression Device  Continuous        Comments: Left leg only.    05/03/25 0149    05/03/25 0030  sodium chloride 0.9 % flush 10 mL  Every 12 Hours Scheduled         05/02/25 2335 05/03/25 0030  sennosides-docusate (PERICOLACE) 8.6-50 MG per tablet 2 tablet  2 Times Daily        Placed in \"And\" Linked Group    05/02/25 2335 05/03/25 0030  acetaminophen (TYLENOL) tablet 1,000 mg  Every 8 Hours         05/02/25 2335    05/03/25 0030  lactated ringers infusion  Continuous,   Status:  Discontinued         05/02/25 2335 05/03/25 0000  Vital Signs  Every 4 " "Hours      Comments: Per per hospital policy    05/02/25 2335 05/02/25 2336  Notify Physician (with Parameters)  Until Discontinued         05/02/25 2335 05/02/25 2336  Intake & Output  Every Shift       05/02/25 2335 05/02/25 2336  Weigh patient  Once         05/02/25 2335 05/02/25 2336  Insert Peripheral IV  Once         05/02/25 2335 05/02/25 2336  Saline Lock & Maintain IV Access  Continuous,   Status:  Canceled         05/02/25 2335 05/02/25 2336  Continuous Cardiac Monitoring  Continuous        Comments: Follow Standing Orders As Outlined in Process Instructions (Open Order Report to View Full Instructions)    05/02/25 2335 05/02/25 2336  Maintain IV Access  Continuous         05/02/25 2335 05/02/25 2336  Telemetry - Place Orders & Notify Provider of Results When Patient Experiences Acute Chest Pain, Dysrhythmia or Respiratory Distress  Continuous        Comments: Open Order Report to View Parameters Requiring Provider Notification    05/02/25 2335 05/02/25 2336  May Be Off Telemetry for Tests  Continuous         05/02/25 2335 05/02/25 2336  Diet: Cardiac; Healthy Heart (2-3 Na+); Fluid Consistency: Thin (IDDSI 0)  Diet Effective Now         05/02/25 2335 05/02/25 2336  Procalcitonin  STAT         05/02/25 2335 05/02/25 2336  Urine Drug Screen - Straight Cath  Once         05/02/25 2335 05/02/25 2336  Case Management  Consult  Once        Provider:  (Not yet assigned)    05/02/25 2335 05/02/25 2336  Urinalysis With Microscopic If Indicated (No Culture) - Straight Cath  Once         05/02/25 2335 05/02/25 2336  PT Consult: Eval & Treat Other; right lateral tibial plateau fracture  Once         05/02/25 2335 05/02/25 2336  OT Consult: Eval & Treat Other; right lateral tibial plateau fracture  Once         05/02/25 2335 05/02/25 2335  polyethylene glycol (MIRALAX) packet 17 g  Daily PRN        Placed in \"And\" Linked Group    05/02/25 2335    " "05/02/25 2335  bisacodyl (DULCOLAX) EC tablet 5 mg  Daily PRN        Placed in \"And\" Linked Group    05/02/25 2335    05/02/25 2335  bisacodyl (DULCOLAX) suppository 10 mg  Daily PRN        Placed in \"And\" Linked Group    05/02/25 2335    05/02/25 2335  dextrose (GLUTOSE) oral gel 15 g  Every 15 Minutes PRN         05/02/25 2335    05/02/25 2335  dextrose (D50W) (25 g/50 mL) IV injection 25 g  Every 15 Minutes PRN         05/02/25 2335    05/02/25 2335  glucagon HCl (Diagnostic) injection 1 mg  Every 15 Minutes PRN         05/02/25 2335    05/02/25 2335  sodium chloride 0.9 % flush 10 mL  As Needed         05/02/25 2335 05/02/25 2335  sodium chloride 0.9 % infusion 40 mL  As Needed         05/02/25 2335 05/02/25 2335  nitroglycerin (NITROSTAT) SL tablet 0.4 mg  Every 5 Minutes PRN         05/02/25 2335    05/02/25 2335  morphine injection 1 mg  Every 3 Hours PRN         05/02/25 2335 05/02/25 2335  methocarbamol (ROBAXIN) tablet 500 mg  Every 8 Hours PRN         05/02/25 2335 05/02/25 2236  Code Status and Medical Interventions: CPR (Attempt to Resuscitate); Full Support  Continuous         05/02/25 2241 05/02/25 2235  Initiate Observation Status  Once         05/02/25 2241 05/02/25 2231  Inpatient Hospitalist Consult  STAT        Specialty:  Hospitalist  Provider:  (Not yet assigned)    05/02/25 2231 05/02/25 2230  Obtain & Apply The Following- Lower extremity; Knee immobilizer  Once         05/02/25 2229 05/02/25 2052  morphine injection 2 mg  Once         05/02/25 2036 05/02/25 2050  sotalol (BETAPACE) tablet 80 mg  Once         05/02/25 2034 05/02/25 1837  CT Lower Extremity Right Without Contrast  1 Time Imaging         05/02/25 1836 05/02/25 1731  diazePAM (VALIUM) tablet 5 mg  Once         05/02/25 1715 05/02/25 1608  CBC & Differential  Once         05/02/25 1607    05/02/25 1608  Comprehensive Metabolic Panel  Once         05/02/25 1607    05/02/25 1608  Urinalysis With " Microscopic If Indicated (No Culture) - Urine, Clean Catch  Once,   Status:  Canceled         05/02/25 1607    05/02/25 1608  [place] Draw  Once         05/02/25 1607    05/02/25 1608  ECG 12 Lead Tachycardia  Once         05/02/25 1607    05/02/25 1608  XR Chest 1 View  1 Time Imaging         05/02/25 1607    05/02/25 1608  CBC Auto Differential  PROCEDURE ONCE         05/02/25 1607    05/02/25 1608  Green Top (Gel)  PROCEDURE ONCE         05/02/25 1607    05/02/25 1608  Lavender Top  PROCEDURE ONCE         05/02/25 1607    05/02/25 1608  Gold Top - SST  PROCEDURE ONCE         05/02/25 1607    05/02/25 1608  Light Blue Top  PROCEDURE ONCE         05/02/25 1607    05/02/25 1608  Apply ice to affected area  STAT         05/02/25 1607    05/02/25 1607  XR Ankle 3+ View Right  1 Time Imaging         05/02/25 1607    05/02/25 1607  XR Femur 2 View Right  1 Time Imaging         05/02/25 1607    05/02/25 1607  XR Shoulder 2+ View Right  1 Time Imaging         05/02/25 1607    05/02/25 1601  XR Tibia Fibula 2 View Right  1 Time Imaging         05/02/25 1600    05/02/25 1554  Vital Signs  Per Hospital Policy/Protocol         05/02/25 1553    05/02/25 0000  Telemetry Scan  Once         05/02/25 0000    05/02/25 0000  Telemetry Scan  Once         05/02/25 0000    05/02/25 0000  Telemetry Scan  Once         05/02/25 0000    05/02/25 0000  Telemetry Scan  Once         05/02/25 0000    05/02/25 0000  Telemetry Scan  Once         05/02/25 0000    05/02/25 0000  Telemetry Scan  Once         05/02/25 0000    Unscheduled  Up With Assistance  As Needed       05/02/25 2335    Unscheduled  POC Glucose Finger PRN  As Needed      Comments: Complete no more than 45 minutes prior to patient eating      05/02/25 2335    Unscheduled  Follow Hypoglycemia Standing Orders For Blood Glucose <70 & Notify Provider of Treatment  As Needed      Comments: Follow Hypoglycemia Orders As Outlined in Process Instructions (Open Order Report to View Full  Instructions)  Notify Provider Any Time Hypoglycemia Treatment is Administered    05/02/25 2335    --  divalproex (DEPAKOTE) 125 MG DR tablet  Daily PRN         05/02/25 2323    --  furosemide (LASIX) 20 MG tablet  Daily         05/02/25 2323    --  sotalol (BETAPACE) 80 MG tablet  2 Times Daily         05/02/25 2323    --  lansoprazole (PREVACID) 15 MG capsule  Daily         05/02/25 2323    Signed and Held  Obtain Informed Consent  Once         Signed and Held    Signed and Held  NPO Diet NPO Type: Strict NPO  Diet Effective Midnight         Signed and Held                     Physician Progress Notes (all)        Theresa Iverson, APRN at 05/04/25 1252          Inpatient Progress Note  [address]  Date: 05/04/25  MRN: [id number]      Subjective:  Patient seen and evaluated regarding right lateral tibial plateau fracture. She endorses moderate persistent pain and swelling to the lateral right knee.  She has been nonweightbearing and is wearing the knee immobilizer.  She has complaints of chronic shoulder and knee pain and has difficulty completing her ADLs without assistance.     Objective:      Vitals:    05/03/25 2204 05/04/25 0300 05/04/25 0632 05/04/25 1049   BP: 114/74 98/64 107/65 107/66   BP Location: Right arm Right arm Right arm Right arm   Patient Position: Lying Lying Lying Lying   Pulse:  103     Resp: 18 18 18 18   Temp: 98.2 °F (36.8 °C) 98.2 °F (36.8 °C) 98.4 °F (36.9 °C)    TempSrc: Oral Oral Oral    SpO2:       Weight:       Height:              Physical Exam:  Constitutional:  Well-developed, well-nourished.  No acute distress.        Musculoskeletal:  Right knee exam: Moderate effusion.  No erythema.  Ecchymosis to the lateral knee.  No open wounds.  Calf is soft and nontender.  Light touch sensation intact all dermatomes.  + Dorsi and plantarflexion, +2 DP pulse.     Labs:    Results from last 7 days   Lab Units 05/03/25  0107 05/02/25  1633   WBC 10*3/mm3 13.41* 11.72*   HEMOGLOBIN  "g/dL 12.1 13.7   HEMATOCRIT % 38.0 44.1   MCV fL 94.8 95.2   MCHC g/dL 31.8 31.1*   PLATELETS 10*3/mm3 225 286         Results from last 7 days   Lab Units 05/03/25  0107 05/02/25  1633   SODIUM mmol/L 131* 135*   POTASSIUM mmol/L 5.0 4.3   MAGNESIUM mg/dL  --  2.0   CHLORIDE mmol/L 96* 97*   CO2 mmol/L 21.0* 21.7*   BUN mg/dL 11 11   CREATININE mg/dL 0.89 0.96   CALCIUM mg/dL 9.1 9.7   GLUCOSE mg/dL 138* 160*   ALBUMIN g/dL 3.5 4.2   BILIRUBIN mg/dL 1.4* 1.1   ALK PHOS U/L 109 120*   AST (SGOT) U/L 33* 20   ALT (SGPT) U/L 16 14   Estimated Creatinine Clearance: 50.3 mL/min (by C-G formula based on SCr of 0.89 mg/dL).  No results found for: \"AMMONIA\"          No results found for: \"HGBA1C\"  Lab Results   Component Value Date    TSH 4.760 (H) 05/02/2025         Pain Management Panel          Latest Ref Rng & Units 5/3/2025   Pain Management Panel   Amphetamine, Urine Qual Negative Negative    Barbiturates Screen, Urine Negative Negative    Benzodiazepine Screen, Urine Negative Positive    Buprenorphine, Screen, Urine Negative Negative    Cocaine Screen, Urine Negative Negative    Fentanyl, Urine Negative Negative    Methadone Screen , Urine Negative Negative    Methamphetamine, Ur Negative Negative        No results found for: \"BLOODCX\"  No results found for: \"URINECX\"  No results found for: \"WOUNDCX\"  No results found for: \"STOOLCX\"              Radiology:  Imaging Results (Last 72 Hours)       Procedure Component Value Units Date/Time    CT Lower Extremity Right Without Contrast [[id number]] Collected: 05/02/25 2004     Updated: 05/02/25 2007    Narrative:      REASON FOR EXAMINATION: Fall trauma pain.     COMPARISON: None available.     TECHNIQUE: CT of the right knee is performed on a multi-detector CT.  Coronal and sagittal reconstructions were obtained. CT Scan performed  according to as low as reasonably achievable dose protocol.     FINDINGS:  There is an impacted fracture of the lateral right tibial plateau.  " Loss  of height is 16 mm image 55 of series 5.  Fracture line also extends at  least to the lateral margin of the the tibial spines image 53 of series  5.  There is osteoarthritis with joint space loss medial femorotibial  joint space as well as chondrocalcinosis.  No fracture of the medial  tibial plateau is seen.  The fibula appears preserved.  Degenerative  change patellofemoral joint particularly laterally.       Impression:      Lateral tibial plateau fracture with depression as described.     This report was finalized on 5/2/2025 8:05 PM by YANNA UMANZOR MD.       XR Femur 2 View Right [[id number]] Collected: 05/02/25 1748     Updated: 05/02/25 1807    Narrative:      Exam: XR FEMUR 2 VW RIGHT-     History: Injury     Comparison: No images available     Findings:     No acute fracture or dislocation.     Soft tissue are normal.       Impression:      Impression:     No acute bony process of the femur.     This report was finalized on 5/2/2025 5:49 PM by Paco Soliman MD.       XR Tibia Fibula 2 View Right [[id number]] Collected: 05/02/25 1749     Updated: 05/02/25 1806    Narrative:      Exam: XR TIBIA FIBULA 2 VW RIGHT-     History: injury     Comparison: No images available     Findings:     Comminuted depressed lateral tibial plateau fracture. Question fibula  shaft fracture.      No other acute fracture or dislocation.     Soft tissue are normal.       Impression:      Impression:     Tibial plateau fracture with possible fibula shaft fracture.      This report was finalized on 5/2/2025 5:50 PM by Paco Soliman MD.       XR Ankle 3+ View Right [[id number]] Collected: 05/02/25 1750     Updated: 05/02/25 1805    Narrative:      Exam: XR ANKLE 3+ VW RIGHT-     History: injury     Comparison: No images available     Findings:     No acute fracture or dislocation.     Soft tissue are normal.       Impression:      Impression:     No acute bony process.     This report was finalized on 5/2/2025 5:50 PM by  Paco Soliman MD.       XR Shoulder 2+ View Right [[id number]] Collected: 05/02/25 1746     Updated: 05/02/25 1749    Narrative:      Exam: XR SHOULDER 2+ VW RIGHT-     History: Injury     Comparison: No images available     Findings:     Severe subacromial space narrowing with chronic humeral neck deformity.      No acute fracture or dislocation.     Soft tissue are normal.       Impression:      Impression:     No acute bony process.     This report was finalized on 5/2/2025 5:47 PM by Paco Soliman MD.       XR Chest 1 View [[id number]] Collected: 05/02/25 1744     Updated: 05/02/25 1749    Narrative:      Procedure: Frontal view of chest obtained.     Comparison: 5/14/2019     History: weakness     Findings:     Bilateral lung reticulation.   Normal heart size and mediastinal contours.  Trachea is in midline position.  No edema or effusion is seen.  There is no evidence of pneumothorax.       Impression:         Bilateral lung reticulation which is new. Atypical infection versus  fibrosis.      This report was finalized on 5/2/2025 5:46 PM by Paco Soliman MD.                 Assessment:  Right lateral tibial plateau fracture      Plan:      The patient states she is unable to go home and will not be able to manage on her own.  Dr. Harrell will plan to do surgery on Tuesday, 5/6/2025.    Patient will be scheduled for right tibial plateau open reduction internal fixation.  The nature of the surgery and the possible risks involved were discussed with the patient.  Surgical orders have been placed and consent will be obtained.    Continue to hold Eliquis in anticipation for surgery.     Knee immobilzer and NWB to the right lower ext.     Pain control as needed.     Orthopedic surgery will continue to follow.       DAVID Dukes   05/04/25   12:52 EDT    Electronically signed by Theresa Iverson APRN at 05/04/25 1433       Matheus Lovett DO at 05/04/25 1150              [facility]  [facility] PROGRESS NOTE     Patient Identification:  Name:  Johanna Street  Age:  79 y.o.  Sex:  female  :  1945  MRN:  [id number]  Visit Number:  [id number]  Primary Care Provider:  Bi Youssef MD    Length of stay:  0    Chief complaint: Left lower extremity pain    Subjective:    Patient seen and examined at bedside with no nursing staff present.  Patient was asleep when entered the room.  Patient doing well with no complaints.  Currently pain-free and using left knee immobilizer.  No new events overnight.  Will be evaluated for potential placement tomorrow.  ----------------------------------------------------------------------------------------------------------------------  Current Hospital Meds:  acetaminophen, 1,000 mg, Oral, Q8H  diazePAM, 5 mg, Oral, Daily  furosemide, 20 mg, Oral, Daily  pantoprazole, 40 mg, Oral, Q AM  senna-docusate sodium, 2 tablet, Oral, BID  sodium chloride, 10 mL, Intravenous, Q12H  sotalol, 80 mg, Oral, BID      sodium chloride, 100 mL/hr, Last Rate: 100 mL/hr (25 0324)      ----------------------------------------------------------------------------------------------------------------------  Vital Signs:  Temp:  [97.9 °F (36.6 °C)-98.4 °F (36.9 °C)] 98.4 °F (36.9 °C)  Heart Rate:  [] 103  Resp:  [16-20] 18  BP: ()/(62-75) 107/66      25  1545 25  0002   Weight: 78.5 kg (173 lb) 81.2 kg (179 lb 0.2 oz)     Body mass index is 33.82 kg/m².    Intake/Output Summary (Last 24 hours) at 2025 1150  Last data filed at 2025 0300  Gross per 24 hour   Intake 840 ml   Output 400 ml   Net 440 ml     Diet: Cardiac; Healthy Heart (2-3 Na+); Fluid Consistency: Thin (IDDSI 0)  ----------------------------------------------------------------------------------------------------------------------  Physical exam:  Constitutional: Well-nourished  female in no apparent distress.     HENT:  Head:  Normocephalic and atraumatic.   Mouth:  Moist mucous membranes.    Eyes:  Conjunctivae and EOM are normal.  Pupils are equal, round, and reactive to light.  No scleral icterus.    Neck:  Neck supple. No thyromegaly.  No JVD present.    Cardiovascular:  Regular rate and rhythm with no murmurs, rubs, clicks or gallops appreciated.  Pulmonary/Chest:  Clear to auscultation bilaterally with no crackles, wheezes or rhonchi appreciated.  Abdominal:  Soft. Nontender. Nondistended  Bowel sounds are normal in all four quadrants. No organomegally appreciated.   Musculoskeletal:  5/5 muscle strength bilateral upper and lower extremities with equal muscle tone and bulk. No edema, no tenderness, and no deformity.  No red or swollen joints anywhere.    Neurological:  Alert and oriented to person, place, and time. Cranial nerves II-XII intact with no focal deficits.  No facial droop.  No slurred speech.   Skin:  Warm and dry to palpation with no rashes or lesions appreciated.  Peripheral vascular:  2+ radial and pedal pulses in bilateral upper and lower extremities.  Psychiatric:  Alert and oriented x3, demonstrates appropriate judgment and insight.    No significant change in physical exam in comparison to 5/3/2025  ---------------------------------------------------------------------------------------  ----------------------------------------------------------------------------------------------------------------------      Results from last 7 days   Lab Units 05/03/25  0107 05/02/25  1633   WBC 10*3/mm3 13.41* 11.72*   HEMOGLOBIN g/dL 12.1 13.7   HEMATOCRIT % 38.0 44.1   MCV fL 94.8 95.2   MCHC g/dL 31.8 31.1*   PLATELETS 10*3/mm3 225 286         Results from last 7 days   Lab Units 05/03/25  0107 05/02/25  1633   SODIUM mmol/L 131* 135*   POTASSIUM mmol/L 5.0 4.3   MAGNESIUM mg/dL  --  2.0   CHLORIDE mmol/L 96* 97*   CO2 mmol/L 21.0* 21.7*   BUN mg/dL 11 11   CREATININE mg/dL 0.89 0.96   CALCIUM mg/dL 9.1 9.7   GLUCOSE mg/dL 138* 160*   ALBUMIN g/dL 3.5 4.2  "  BILIRUBIN mg/dL 1.4* 1.1   ALK PHOS U/L 109 120*   AST (SGOT) U/L 33* 20   ALT (SGPT) U/L 16 14   Estimated Creatinine Clearance: 50.3 mL/min (by C-G formula based on SCr of 0.89 mg/dL).    No results found for: \"AMMONIA\"      No results found for: \"BLOODCX\"  No results found for: \"URINECX\"  No results found for: \"WOUNDCX\"  No results found for: \"STOOLCX\"    I have personally looked at the labs and they are summarized above.  ----------------------------------------------------------------------------------------------------------------------  Imaging Results (Last 24 Hours)       ** No results found for the last 24 hours. **          ----------------------------------------------------------------------------------------------------------------------  Assessment and Plan:    Acute lateral tibial plateau fracture -patient has been placed in a knee immobilizer.  Orthopedic surgery has recommended outpatient follow-up for further evaluation regarding potential need of surgery.  However, patient unable to take care of herself.  Currently being evaluated by PT/OT and case management has been consulted to assist with placement.    2.  Atrial fibrillation -chronic in nature, currently rate controlled.  Will restart Eliquis today as orthopedic surgery stated in their last note that there is no intention for surgery in the near future.    3.  Essential hypertension -well-controlled    4.  GERD - protonix    Disposition currently pending placement    Matehus Lovett DO   25   11:50 EDT       Electronically signed by Matheus Lovett DO at 25 1153       Matheus Lovett DO at 25 1113              [facility]     Patient Identification:  Name:  Johanna Street  Age:  79 y.o.  Sex:  female  :  1945  MRN:  [id number]  Visit Number:  [id number]  Primary Care Provider:  Bi Youssef MD    Length of stay:  0    Chief complaint: Left lower " extremity pain    Subjective:    Patient reports mild left lower extremity pain which is currently well-controlled with current pain medication regimen.  She denies any other complaints at this time.  Per nursing staff, no new events overnight.  ----------------------------------------------------------------------------------------------------------------------  Current Hospital Meds:  acetaminophen, 1,000 mg, Oral, Q8H  senna-docusate sodium, 2 tablet, Oral, BID  sodium chloride, 10 mL, Intravenous, Q12H      sodium chloride, 100 mL/hr, Last Rate: 100 mL/hr (05/03/25 0544)      ----------------------------------------------------------------------------------------------------------------------  Vital Signs:  Temp:  [97.6 °F (36.4 °C)-98.2 °F (36.8 °C)] 98 °F (36.7 °C)  Heart Rate:  [] 99  Resp:  [16-18] 16  BP: ()/(59-97) 122/71      05/02/25  1545 05/03/25  0002   Weight: 78.5 kg (173 lb) 81.2 kg (179 lb 0.2 oz)     Body mass index is 33.82 kg/m².    Intake/Output Summary (Last 24 hours) at 5/3/2025 1113  Last data filed at 5/3/2025 0900  Gross per 24 hour   Intake 240 ml   Output --   Net 240 ml     Diet: Cardiac; Healthy Heart (2-3 Na+); Fluid Consistency: Thin (IDDSI 0)  ----------------------------------------------------------------------------------------------------------------------  Physical exam:  Constitutional: Well-nourished  female in no apparent distress.     HENT:  Head:  Normocephalic and atraumatic.  Mouth:  Moist mucous membranes.    Eyes:  Conjunctivae and EOM are normal.  Pupils are equal, round, and reactive to light.  No scleral icterus.    Neck:  Neck supple. No thyromegaly.  No JVD present.    Cardiovascular:  Regular rate and rhythm with no murmurs, rubs, clicks or gallops appreciated.  Pulmonary/Chest:  Clear to auscultation bilaterally with no crackles, wheezes or rhonchi appreciated.  Abdominal:  Soft. Nontender. Nondistended  Bowel sounds are normal in all four  "quadrants. No organomegally appreciated.   Musculoskeletal:  5/5 muscle strength bilateral upper and lower extremities with equal muscle tone and bulk. No edema, no tenderness, and no deformity.  No red or swollen joints anywhere.    Neurological:  Alert and oriented to person, place, and time. Cranial nerves II-XII intact with no focal deficits.  No facial droop.  No slurred speech.   Skin:  Warm and dry to palpation with no rashes or lesions appreciated.  Peripheral vascular:  2+ radial and pedal pulses in bilateral upper and lower extremities.  Psychiatric:  Alert and oriented x3, demonstrates appropriate judgment and insight.  ---------------------------------------------------------------------------------------  ----------------------------------------------------------------------------------------------------------------------      Results from last 7 days   Lab Units 05/03/25 0107 05/02/25  1633   WBC 10*3/mm3 13.41* 11.72*   HEMOGLOBIN g/dL 12.1 13.7   HEMATOCRIT % 38.0 44.1   MCV fL 94.8 95.2   MCHC g/dL 31.8 31.1*   PLATELETS 10*3/mm3 225 286         Results from last 7 days   Lab Units 05/03/25  0107 05/02/25  1633   SODIUM mmol/L 131* 135*   POTASSIUM mmol/L 5.0 4.3   MAGNESIUM mg/dL  --  2.0   CHLORIDE mmol/L 96* 97*   CO2 mmol/L 21.0* 21.7*   BUN mg/dL 11 11   CREATININE mg/dL 0.89 0.96   CALCIUM mg/dL 9.1 9.7   GLUCOSE mg/dL 138* 160*   ALBUMIN g/dL 3.5 4.2   BILIRUBIN mg/dL 1.4* 1.1   ALK PHOS U/L 109 120*   AST (SGOT) U/L 33* 20   ALT (SGPT) U/L 16 14   Estimated Creatinine Clearance: 50.3 mL/min (by C-G formula based on SCr of 0.89 mg/dL).    No results found for: \"AMMONIA\"      No results found for: \"BLOODCX\"  No results found for: \"URINECX\"  No results found for: \"WOUNDCX\"  No results found for: \"STOOLCX\"    I have personally looked at the labs and they are summarized " above.  ----------------------------------------------------------------------------------------------------------------------  Imaging Results (Last 24 Hours)       Procedure Component Value Units Date/Time    CT Lower Extremity Right Without Contrast [[id number]] Collected: 05/02/25 2004     Updated: 05/02/25 2007    Narrative:      REASON FOR EXAMINATION: Fall trauma pain.     COMPARISON: None available.     TECHNIQUE: CT of the right knee is performed on a multi-detector CT.  Coronal and sagittal reconstructions were obtained. CT Scan performed  according to as low as reasonably achievable dose protocol.     FINDINGS:  There is an impacted fracture of the lateral right tibial plateau.  Loss  of height is 16 mm image 55 of series 5.  Fracture line also extends at  least to the lateral margin of the the tibial spines image 53 of series  5.  There is osteoarthritis with joint space loss medial femorotibial  joint space as well as chondrocalcinosis.  No fracture of the medial  tibial plateau is seen.  The fibula appears preserved.  Degenerative  change patellofemoral joint particularly laterally.       Impression:      Lateral tibial plateau fracture with depression as described.     This report was finalized on 5/2/2025 8:05 PM by YANNA UMANZOR MD.       XR Femur 2 View Right [[id number]] Collected: 05/02/25 1748     Updated: 05/02/25 1807    Narrative:      Exam: XR FEMUR 2 VW RIGHT-     History: Injury     Comparison: No images available     Findings:     No acute fracture or dislocation.     Soft tissue are normal.       Impression:      Impression:     No acute bony process of the femur.     This report was finalized on 5/2/2025 5:49 PM by Paco Soliman MD.       XR Tibia Fibula 2 View Right [[id number]] Collected: 05/02/25 1749     Updated: 05/02/25 1806    Narrative:      Exam: XR TIBIA FIBULA 2 VW RIGHT-     History: injury     Comparison: No images available     Findings:     Comminuted depressed lateral  tibial plateau fracture. Question fibula  shaft fracture.      No other acute fracture or dislocation.     Soft tissue are normal.       Impression:      Impression:     Tibial plateau fracture with possible fibula shaft fracture.      This report was finalized on 5/2/2025 5:50 PM by Paco Soliman MD.       XR Ankle 3+ View Right [[id number]] Collected: 05/02/25 1750     Updated: 05/02/25 1805    Narrative:      Exam: XR ANKLE 3+ VW RIGHT-     History: injury     Comparison: No images available     Findings:     No acute fracture or dislocation.     Soft tissue are normal.       Impression:      Impression:     No acute bony process.     This report was finalized on 5/2/2025 5:50 PM by Paco Soliman MD.       XR Shoulder 2+ View Right [[id number]] Collected: 05/02/25 1746     Updated: 05/02/25 1749    Narrative:      Exam: XR SHOULDER 2+ VW RIGHT-     History: Injury     Comparison: No images available     Findings:     Severe subacromial space narrowing with chronic humeral neck deformity.      No acute fracture or dislocation.     Soft tissue are normal.       Impression:      Impression:     No acute bony process.     This report was finalized on 5/2/2025 5:47 PM by Paco Soliman MD.       XR Chest 1 View [[id number]] Collected: 05/02/25 1744     Updated: 05/02/25 1749    Narrative:      Procedure: Frontal view of chest obtained.     Comparison: 5/14/2019     History: weakness     Findings:     Bilateral lung reticulation.   Normal heart size and mediastinal contours.  Trachea is in midline position.  No edema or effusion is seen.  There is no evidence of pneumothorax.       Impression:         Bilateral lung reticulation which is new. Atypical infection versus  fibrosis.      This report was finalized on 5/2/2025 5:46 PM by Paco Soliman MD.             ----------------------------------------------------------------------------------------------------------------------  Assessment and Plan:    Acute  lateral tibial plateau fracture -patient has been placed in a knee immobilizer and will plan for orthopedic surgery consultation during this hospitalization per admitting provider.  Continue.  Pain medications.  Patient unable to care for herself at home independently, will consult PT/OT and case management for possible placement.    2.  Atrial fibrillation -chronic in nature, currently rate controlled.  Will restart Eliquis once orthopedic surgery confirms no intention for immediate surgical intervention    3.  Essential hypertension -will restart home antihypertensive medications once available from pharmacy    4.  GERD - protonix    Disposition currently pending placement    Matheus Lovett DO   25   11:13 EDT       Electronically signed by Matheus Lovett DO at 25 1129          Consult Notes (all)        Theresa Iverson APRN at 25 1248        Consult Orders    1. Inpatient Orthopedic Surgery Consult [[id number]] ordered by Pia Wren DO                   Inpatient Orthopedic Surgery Consult  Consult performed by: Theresa Iverson APRN  Consult ordered by: Pia Wren DO          Patient Identification:  Name:  Johanna Street  Age:  79 y.o.  Sex:  female  :  1945  MRN:  [id number]  Visit Number:  [id number]  Primary care provider:  Bi Youssef MD    History of present illness:  Patient is a 80 y/o female with a PMH significant for A-fib anticoagulated with Eliquis, TIA, HTN, GERD, chronic back pain who presented to [facility] ED following a fall. She fell getting into her car after her leg gave out.  X rays and CT revealed a right lateral tibial plateau fracture.  The patient states she has been feeling generally weak for about the past 4 weeks, since having a severe episode of back pain.  She denies any injury at that time.  She is wearing a knee immobilizer and states that her knee pain is  controlled.    ---------------------------------------------------------------------------------------------------------------------    Review of Systems   Constitutional:  Positive for activity change.   Respiratory:  Negative for shortness of breath.    Cardiovascular:  Negative for chest pain.   Musculoskeletal:  Positive for arthralgias, gait problem and neck pain.   Neurological:  Positive for numbness. Negative for weakness.   All other systems reviewed and are negative.     ---------------------------------------------------------------------------------------------------------------------   Past History:  Family History   Problem Relation Age of Onset    Heart attack Mother     Stroke Mother     Coronary artery disease Mother     Diabetes Mother     Hypertension Mother     Heart disease Father     Arthritis Father     Coronary artery disease Father     Liver disease Brother      Past Medical History:   Diagnosis Date    Ankle edema     Anxiety     Arthritis     Atrial fibrillation     Back pain     Depression     Hernia, hiatal     PAF (paroxysmal atrial fibrillation)      Past Surgical History:   Procedure Laterality Date    APPENDECTOMY      CARDIAC CATHETERIZATION  12/2013    CHOLECYSTECTOMY      COLON SURGERY      COLONOSCOPY  2006    ENDOSCOPY N/A 12/7/2017    Procedure: ESOPHAGOGASTRODUODENOSCOPY WITH ANESTHESIA;  Surgeon: Eliseo Moon MD;  Location: [facility] OR;  Service:     ENDOSCOPY N/A 2/21/2018    Procedure: ESOPHAGOGASTRODUODENOSCOPY WITH DILATATION CPT CODE: 89510;  Surgeon: Tomi Dey III, MD;  Location: [facility];  Service:     EYE SURGERY      HYSTERECTOMY      NERVE REPAIR      TONSILLECTOMY       Social History     Socioeconomic History    Marital status:    Tobacco Use    Smoking status: Never    Smokeless tobacco: Never   Vaping Use    Vaping status: Never Used   Substance and Sexual Activity    Alcohol use: No    Drug use: No    Sexual activity: Defer      ---------------------------------------------------------------------------------------------------------------------   Allergies:  Codeine, Diltiazem, Zinc, Levaquin [levofloxacin], and Penicillins  ---------------------------------------------------------------------------------------------------------------------   Prior to Admission Medications       Prescriptions Last Dose Informant Patient Reported? Taking?    apixaban (ELIQUIS) 5 MG tablet tablet 5/2/2025 Self, Family Member No Yes    Take 1 tablet by mouth Every 12 (Twelve) Hours.    diazepam (VALIUM) 5 MG tablet 5/2/2025 Self, Family Member Yes Yes    Take 1 tablet by mouth Daily.    furosemide (LASIX) 20 MG tablet 5/2/2025 Self, Family Member Yes Yes    Take 1 tablet by mouth Daily.    lansoprazole (PREVACID) 15 MG capsule 5/2/2025 Self, Family Member Yes Yes    Take 1 capsule by mouth Daily.    sotalol (BETAPACE) 80 MG tablet 5/2/2025 Self, Family Member Yes Yes    Take 1 tablet by mouth 2 (Two) Times a Day.    divalproex (DEPAKOTE) 125 MG DR tablet Unknown Self, Family Member Yes No    Take 1 tablet by mouth Daily As Needed (Headache).          Hospital Meds:  acetaminophen, 1,000 mg, Oral, Q8H  diazePAM, 5 mg, Oral, Daily  furosemide, 20 mg, Oral, Daily  [START ON 5/4/2025] pantoprazole, 40 mg, Oral, Q AM  senna-docusate sodium, 2 tablet, Oral, BID  sodium chloride, 10 mL, Intravenous, Q12H  sotalol, 80 mg, Oral, BID      sodium chloride, 100 mL/hr, Last Rate: 100 mL/hr (05/03/25 1200)      ---------------------------------------------------------------------------------------------------------------------   Vital Signs:  Temp:  [97.6 °F (36.4 °C)-98.2 °F (36.8 °C)] 98 °F (36.7 °C)  Heart Rate:  [] 94  Resp:  [16-18] 16  BP: ()/(59-97) 142/69      05/02/25  1545 05/03/25  0002   Weight: 78.5 kg (173 lb) 81.2 kg (179 lb 0.2 oz)     Body mass index is 33.82  kg/m².  ---------------------------------------------------------------------------------------------------------------------     Physical exam:  Constitutional:  Well-developed and well-nourished.  No acute distress.      HENT:  Head: Normocephalic and atraumatic.  Mouth:  Moist mucous membranes.    Eyes:  Conjunctivae are normal.  Pupils are equal, round, and reactive to light.  No scleral icterus.  Neck:  Neck supple.  Trachea midline.  Cardiovascular:  Normal rate and regular rhythm.  Pulmonary/Chest:  No respiratory distress and good air movement.  Abdominal:  Soft.  No distension and no tenderness.   Musculoskeletal: Right knee: Knee immobilizer.  Mild soft tissue swelling.  Right dorsi and plantarflexion is intact.  +2 DP pulse.           Neurological:  Alert and oriented to person, place, and time.     Skin:  Skin is warm and dry.  No rash noted.  No ecchymosis or abrasion.   Psychiatric:  Normal mood and affect.  Behavior is normal.  Judgment and thought content normal.   Peripheral vascular:  No pitting edema and strong pulses on all 4 extremities.      ---------------------------------------------------------------------------------------------------------------------   .  ---------------------------------------------------------------------------------------------------------------------   Results from last 7 days   Lab Units 05/03/25  0107 05/02/25  1633   WBC 10*3/mm3 13.41* 11.72*   HEMOGLOBIN g/dL 12.1 13.7   HEMATOCRIT % 38.0 44.1   MCV fL 94.8 95.2   MCHC g/dL 31.8 31.1*   PLATELETS 10*3/mm3 225 286         Results from last 7 days   Lab Units 05/03/25  0107 05/02/25  1633   SODIUM mmol/L 131* 135*   POTASSIUM mmol/L 5.0 4.3   MAGNESIUM mg/dL  --  2.0   CHLORIDE mmol/L 96* 97*   CO2 mmol/L 21.0* 21.7*   BUN mg/dL 11 11   CREATININE mg/dL 0.89 0.96   CALCIUM mg/dL 9.1 9.7   GLUCOSE mg/dL 138* 160*   ALBUMIN g/dL 3.5 4.2   BILIRUBIN mg/dL 1.4* 1.1   ALK PHOS U/L 109 120*   AST (SGOT) U/L 33* 20   ALT  "(SGPT) U/L 16 14   Estimated Creatinine Clearance: 50.3 mL/min (by C-G formula based on SCr of 0.89 mg/dL).  No results found for: \"AMMONIA\"          Lab Results   Component Value Date    HGBA1C 5.50 08/31/2017     Lab Results   Component Value Date    TSH 4.760 (H) 05/02/2025     No results found for: \"PREGTESTUR\", \"PREGSERUM\", \"HCG\", \"HCGQUANT\"  Pain Management Panel          Latest Ref Rng & Units 5/3/2025   Pain Management Panel   Amphetamine, Urine Qual Negative Negative    Barbiturates Screen, Urine Negative Negative    Benzodiazepine Screen, Urine Negative Positive    Buprenorphine, Screen, Urine Negative Negative    Cocaine Screen, Urine Negative Negative    Fentanyl, Urine Negative Negative    Methadone Screen , Urine Negative Negative    Methamphetamine, Ur Negative Negative      No results found for: \"BLOODCX\"  No results found for: \"URINECX\"  No results found for: \"WOUNDCX\"  No results found for: \"STOOLCX\"      ---------------------------------------------------------------------------------------------------------------------   Imaging Results (Last 7 Days)       Procedure Component Value Units Date/Time    CT Lower Extremity Right Without Contrast [[id number]] Collected: 05/02/25 2004     Updated: 05/02/25 2007    Narrative:      REASON FOR EXAMINATION: Fall trauma pain.     COMPARISON: None available.     TECHNIQUE: CT of the right knee is performed on a multi-detector CT.  Coronal and sagittal reconstructions were obtained. CT Scan performed  according to as low as reasonably achievable dose protocol.     FINDINGS:  There is an impacted fracture of the lateral right tibial plateau.  Loss  of height is 16 mm image 55 of series 5.  Fracture line also extends at  least to the lateral margin of the the tibial spines image 53 of series  5.  There is osteoarthritis with joint space loss medial femorotibial  joint space as well as chondrocalcinosis.  No fracture of the medial  tibial plateau is seen.  The " fibula appears preserved.  Degenerative  change patellofemoral joint particularly laterally.       Impression:      Lateral tibial plateau fracture with depression as described.     This report was finalized on 5/2/2025 8:05 PM by YANNA UMANZOR MD.       XR Femur 2 View Right [[id number]] Collected: 05/02/25 1748     Updated: 05/02/25 1807    Narrative:      Exam: XR FEMUR 2 VW RIGHT-     History: Injury     Comparison: No images available     Findings:     No acute fracture or dislocation.     Soft tissue are normal.       Impression:      Impression:     No acute bony process of the femur.     This report was finalized on 5/2/2025 5:49 PM by Paco Soliman MD.       XR Tibia Fibula 2 View Right [[id number]] Collected: 05/02/25 1749     Updated: 05/02/25 1806    Narrative:      Exam: XR TIBIA FIBULA 2 VW RIGHT-     History: injury     Comparison: No images available     Findings:     Comminuted depressed lateral tibial plateau fracture. Question fibula  shaft fracture.      No other acute fracture or dislocation.     Soft tissue are normal.       Impression:      Impression:     Tibial plateau fracture with possible fibula shaft fracture.      This report was finalized on 5/2/2025 5:50 PM by Paco Soliman MD.       XR Ankle 3+ View Right [[id number]] Collected: 05/02/25 1750     Updated: 05/02/25 1805    Narrative:      Exam: XR ANKLE 3+ VW RIGHT-     History: injury     Comparison: No images available     Findings:     No acute fracture or dislocation.     Soft tissue are normal.       Impression:      Impression:     No acute bony process.     This report was finalized on 5/2/2025 5:50 PM by Paco Soliman MD.       XR Shoulder 2+ View Right [[id number]] Collected: 05/02/25 1746     Updated: 05/02/25 1749    Narrative:      Exam: XR SHOULDER 2+ VW RIGHT-     History: Injury     Comparison: No images available     Findings:     Severe subacromial space narrowing with chronic humeral neck deformity.      No  acute fracture or dislocation.     Soft tissue are normal.       Impression:      Impression:     No acute bony process.     This report was finalized on 5/2/2025 5:47 PM by Paco Soliman MD.       XR Chest 1 View [[id number]] Collected: 05/02/25 1744     Updated: 05/02/25 1749    Narrative:      Procedure: Frontal view of chest obtained.     Comparison: 5/14/2019     History: weakness     Findings:     Bilateral lung reticulation.   Normal heart size and mediastinal contours.  Trachea is in midline position.  No edema or effusion is seen.  There is no evidence of pneumothorax.       Impression:         Bilateral lung reticulation which is new. Atypical infection versus  fibrosis.      This report was finalized on 5/2/2025 5:46 PM by Paco Soliman MD.             ----------------------------------------------------------------------------------------------------------------------      Assessment: Right lateral tibial plateau fracture       Plan:     Imaging was reviewed with Dr. Schwartz.  CT scan has been completed.    Patient is recommended for outpatient follow-up for scheduling of surgery, however, she states that she lives alone and will not be able to manage by herself.    Continue the knee immobilizer.  Remain nonweightbearing to the right lower extremity at all times.    Pain control as needed.    Orthopedic surgery will continue to follow.    Thank you for the consult.    DAVID Dukes  05/03/25  12:48 EDT    Electronically signed by Theresa Iverson APRN at 05/03/25 3374

## 2025-05-05 NOTE — PLAN OF CARE
Goal Outcome Evaluation:              Outcome Evaluation: Pt VSS on RA this shift. A/Ox4. Pt has no complaints or concerns at this time, will continue with POC.

## 2025-05-05 NOTE — CASE MANAGEMENT/SOCIAL WORK
Discharge Planning Assessment   Jarett     Patient Name: Johanna Street  MRN: [id number]  Today's Date: 5/5/2025    Admit Date: 5/2/2025    Plan: SS received a consult for jimbo-operative shortterm placement. SS spoke with pt at bedside on this date. Pt lives alone. PCP Is Bi Youssef. Pt does not utilize home health at this time. Pt does not utilize DME at this time. Pt does not have a POA/Living will. Pt states she is scheduled for surgery on 5/6/25. Pt states she would be option to rehab options but would like for SS to follow up after surgery about disposition. SS to follow and assist with discharge planning.   Discharge Needs Assessment       Row Name 05/05/25 1605       Living Environment    People in Home alone    Current Living Arrangements home    Potentially Unsafe Housing Conditions none    Primary Care Provided by self    Provides Primary Care For no one    Family Caregiver if Needed other relative(s)    Quality of Family Relationships unable to assess    Able to Return to Prior Arrangements yes       Resource/Environmental Concerns    Resource/Environmental Concerns none    Transportation Concerns none       Transition Planning    Patient/Family Anticipates Transition to home    Patient/Family Anticipated Services at Transition none    Transportation Anticipated family or friend will provide       Discharge Needs Assessment    Equipment Currently Used at Home none    Anticipated Changes Related to Illness none    Equipment Needed After Discharge none                   Discharge Plan       Row Name 05/05/25 1606       Plan    Plan SS received a consult for jimbo-operative shortterm placement. SS spoke with pt at bedside on this date. Pt lives alone. PCP Is Bi Youssef. Pt does not utilize home health at this time. Pt does not utilize DME at this time. Pt does not have a POA/Living will. Pt states she is scheduled for surgery on 5/6/25. Pt states she would be option to rehab options but would like for SS to  follow up after surgery about disposition. SS to follow and assist with discharge planning.    Patient/Family in Agreement with Plan yes               Expected Discharge Date and Time       Expected Discharge Date Expected Discharge Time    May 6, 2025            Demographic Summary       Row Name 05/05/25 5524       General Information    Admission Type inpatient    Referral Source nursing    Reason for Consult discharge planning  SS received a consult for jimbo-operative shortterm placement.                  JESUS MarinW

## 2025-05-05 NOTE — PROGRESS NOTES
[facility] HOSPITALIST PROGRESS NOTE    Subjective     History:   Johanna Street is a 79 y.o. female admitted on 5/2/2025 secondary to Tibial plateau fracture, right     Procedures: None    CC: Follow up right tibial plateau fracture     Patient seen and examined with СВЕТЛАНА Maki. Awake and alert. Reports some spasms in RLE. Reports increased dyspnea and anxiety this AM but improved. No reported CP. No reported vomiting. No acute events reported.     History taken from: patient, chart, and RN.      Objective     Vital Signs  Temp:  [97.4 °F (36.3 °C)-98.1 °F (36.7 °C)] 98 °F (36.7 °C)  Heart Rate:  [] 97  Resp:  [18-20] 20  BP: (105-152)/(63-85) 113/67    Intake/Output Summary (Last 24 hours) at 5/5/2025 1359  Last data filed at 5/5/2025 1100  Gross per 24 hour   Intake 580 ml   Output 1700 ml   Net -1120 ml         Physical Exam:  General:    Awake, alert, in no acute distress   Heart:      Normal S1 and S2. Irregularly irregular.    Lungs:     Respirations regular, even and unlabored. Lungs clear to auscultation B/L. No wheezes, rales or rhonchi.   Abdomen:   Soft and nontender. No guarding, rebound tenderness or  organomegaly noted. Bowel sounds present x 4.   Extremities:  No clubbing, cyanosis or  LLE edema noted. (+) knee immobilizer RLE.      Results Review:    Results from last 7 days   Lab Units 05/05/25  0015 05/03/25  0107 05/02/25  1633   WBC 10*3/mm3 9.71 13.41* 11.72*   HEMOGLOBIN g/dL 10.3* 12.1 13.7   PLATELETS 10*3/mm3 202 225 286     Results from last 7 days   Lab Units 05/05/25  1038 05/05/25  0015 05/03/25  0107 05/02/25  1633   SODIUM mmol/L  --  138 131* 135*   POTASSIUM mmol/L 4.3 3.6 5.0 4.3   CHLORIDE mmol/L  --  106 96* 97*   CO2 mmol/L  --  20.6* 21.0* 21.7*   BUN mg/dL  --  8 11 11   CREATININE mg/dL  --  0.68 0.89 0.96   CALCIUM mg/dL  --  8.1* 9.1 9.7   GLUCOSE mg/dL  --  144* 138* 160*     Results from last 7 days   Lab Units 05/03/25  0107 05/02/25  3658    BILIRUBIN mg/dL 1.4* 1.1   ALK PHOS U/L 109 120*   AST (SGOT) U/L 33* 20   ALT (SGPT) U/L 16 14     Results from last 7 days   Lab Units 05/02/25  1633   MAGNESIUM mg/dL 2.0               Imaging Results (Last 24 Hours)       ** No results found for the last 24 hours. **              Medications:  acetaminophen, 1,000 mg, Oral, Q8H  diazePAM, 5 mg, Oral, Daily  furosemide, 20 mg, Oral, Daily  pantoprazole, 40 mg, Oral, Q AM  senna-docusate sodium, 2 tablet, Oral, BID  sodium chloride, 10 mL, Intravenous, Q12H  sotalol, 80 mg, Oral, BID               Assessment & Plan   Right lateral tibial plateau fracture with questionable non-displaced right fibular shaft fracture: Ortho following with plans for ORIF on 5/6. Currently NWB with knee immobilizer. Pain control. Ortho input appreciated.     Chronic Afib: Currently rate controlled. Cont sotalol. Holding Eliquis in preparation for ORIF as above.     Essential HTN: BP stable. Cont to monitor.     GERD: Cont PPI.     DVT PPX: SCD to LLE.    Disposition Likely SNF placement pending post-op course.     Maurilio Patel DO  05/05/25  13:59 EDT

## 2025-05-05 NOTE — PLAN OF CARE
Problem: Adult Inpatient Plan of Care  Goal: Plan of Care Review  5/5/2025 0435 by Bisi Gibbons RN  Outcome: Progressing  Flowsheets  Taken 5/5/2025 0435  Progress: improving  Plan of Care Reviewed With: patient  Taken 5/5/2025 0412  Outcome Evaluation: Pt resting in bed at this time. PRN pain meds given. A&Ox4. VSS on RA. IVF infusing. No concerns or requests at this time. Will continue plan of care.  5/5/2025 0434 by Bisi Gibbons RN  Outcome: Progressing  Flowsheets  Taken 5/5/2025 0434  Progress: improving  Taken 5/5/2025 0433  Plan of Care Reviewed With: patient  Taken 5/5/2025 0412  Outcome Evaluation: Pt resting in bed at this time. PRN pain meds given. A&Ox4. VSS on RA. IVF infusing. No concerns or requests at this time. Will continue plan of care.  5/5/2025 0433 by Bisi Gibbons RN  Outcome: Progressing  Flowsheets (Taken 5/5/2025 0412)  Outcome Evaluation: Pt resting in bed at this time. PRN pain meds given. A&Ox4. VSS on RA. IVF infusing. No concerns or requests at this time. Will continue plan of care.  5/5/2025 0433 by Bisi Gibbons RN  Outcome: Progressing  Flowsheets  Taken 5/5/2025 0433  Progress: improving  Plan of Care Reviewed With: patient  Taken 5/5/2025 0412  Outcome Evaluation: Pt resting in bed at this time. PRN pain meds given. A&Ox4. VSS on RA. IVF infusing. No concerns or requests at this time. Will continue plan of care.  5/5/2025 0432 by Bisi Gibbons RN  Outcome: Progressing  Flowsheets (Taken 5/5/2025 0412)  Outcome Evaluation: Pt resting in bed at this time. PRN pain meds given. A&Ox4. VSS on RA. IVF infusing. No concerns or requests at this time. Will continue plan of care.  5/5/2025 0432 by Bisi Gibbons RN  Outcome: Progressing  Flowsheets  Taken 5/5/2025 0432  Progress: improving  Plan of Care Reviewed With: patient  Taken 5/5/2025 0412  Outcome Evaluation: Pt resting in bed at this time. PRN pain meds given. A&Ox4. VSS on RA. IVF infusing. No concerns or requests  at this time. Will continue plan of care.  5/5/2025 0412 by Bisi Gibbons RN  Outcome: Progressing  Flowsheets (Taken 5/5/2025 0412)  Progress: improving  Outcome Evaluation: Pt resting in bed at this time. PRN pain meds given. A&Ox4. VSS on RA. IVF infusing. No concerns or requests at this time. Will continue plan of care.  Plan of Care Reviewed With: patient  Goal: Patient-Specific Goal (Individualized)  5/5/2025 0435 by Bisi Gibbons RN  Outcome: Progressing  5/5/2025 0434 by Bisi Gibbons RN  Outcome: Progressing  5/5/2025 0433 by Bisi Gibbons RN  Outcome: Progressing  5/5/2025 0433 by Bisi Gibbons RN  Outcome: Not Progressing  5/5/2025 0432 by Bisi Gibbons RN  Outcome: Progressing  5/5/2025 0432 by Bisi Gibbons RN  Outcome: Progressing  5/5/2025 0412 by Bisi Gibbons RN  Outcome: Progressing  Goal: Absence of Hospital-Acquired Illness or Injury  5/5/2025 0435 by Bisi Gibbons RN  Outcome: Progressing  5/5/2025 0434 by Bisi Gibbons RN  Outcome: Progressing  5/5/2025 0433 by Bisi Gibbons RN  Outcome: Progressing  5/5/2025 0433 by Bisi Gibbons RN  Outcome: Not Progressing  5/5/2025 0432 by Bisi Gibbons RN  Outcome: Progressing  5/5/2025 0432 by Bisi Gibbons RN  Outcome: Progressing  5/5/2025 0412 by Bisi Gibbons RN  Outcome: Progressing  Intervention: Identify and Manage Fall Risk  Recent Flowsheet Documentation  Taken 5/5/2025 0300 by Bisi Gibbons RN  Safety Promotion/Fall Prevention: safety round/check completed  Taken 5/5/2025 0100 by Bisi Gibbons RN  Safety Promotion/Fall Prevention: safety round/check completed  Taken 5/4/2025 2300 by Bisi Gibbons RN  Safety Promotion/Fall Prevention: safety round/check completed  Taken 5/4/2025/4/2025 2100 by Bisi Gibbons, RN  Safety Promotion/Fall Prevention: safety round/check completed  Taken 5/4/2025 2051 by Bisi Gibbons, RN  Safety Promotion/Fall Prevention:   assistive device/personal items within reach   clutter free  environment maintained   room organization consistent   safety round/check completed   fall prevention program maintained  Taken 5/4/2025 1900 by Bisi Gibbons RN  Safety Promotion/Fall Prevention: safety round/check completed  Intervention: Prevent and Manage VTE (Venous Thromboembolism) Risk  Recent Flowsheet Documentation  Taken 5/4/2025 2051 by Bisi Gibbons RN  VTE Prevention/Management: (see orders) other (see comments)  Intervention: Prevent Infection  Recent Flowsheet Documentation  Taken 5/4/2025 2051 by Bisi Gibbons RN  Infection Prevention:   environmental surveillance performed   equipment surfaces disinfected   hand hygiene promoted   personal protective equipment utilized   rest/sleep promoted  Goal: Optimal Comfort and Wellbeing  5/5/2025 0435 by Bisi Gibbons RN  Outcome: Progressing  5/5/2025 0434 by Bisi Gibbons RN  Outcome: Progressing  5/5/2025 0433 by Bisi Gibbons RN  Outcome: Progressing  5/5/2025 0433 by Bisi Gibbons RN  Outcome: Not Progressing  5/5/2025 0432 by Bisi Gibbons RN  Outcome: Progressing  5/5/2025 0432 by Bisi Gibbons RN  Outcome: Progressing  5/5/2025 0412 by Bisi Gibbons RN  Outcome: Progressing  Intervention: Provide Person-Centered Care  Recent Flowsheet Documentation  Taken 5/4/2025 2051 by Bisi Gibbons RN  Trust Relationship/Rapport:   care explained   choices provided   thoughts/feelings acknowledged  Goal: Readiness for Transition of Care  5/5/2025 0435 by Bisi Gibbons RN  Outcome: Progressing  5/5/2025 0434 by Bisi Gibbons RN  Outcome: Progressing  5/5/2025 0433 by Bisi Gibbons RN  Outcome: Progressing  5/5/2025 0433 by Bisi Gibbons RN  Outcome: Not Progressing  5/5/2025 0432 by Bisi Gibbons RN  Outcome: Progressing  5/5/2025 0432 by Bisi Gibbons RN  Outcome: Progressing  5/5/2025 0412 by Bisi Gibbons RN  Outcome: Progressing     Problem: Skin Injury Risk Increased  Goal: Skin Health and Integrity  5/5/2025 0435 by Shai  Bisi, СВЕТЛАНА  Outcome: Progressing  5/5/2025 0434 by Bisi Gibbons RN  Outcome: Progressing  5/5/2025 0433 by Bisi Gibbons RN  Outcome: Progressing  5/5/2025 0433 by Bisi Gibbons RN  Outcome: Not Progressing  5/5/2025 0432 by Bisi Gibbons RN  Outcome: Progressing  5/5/2025 0432 by Bisi Gibbons, RN  Outcome: Progressing  5/5/2025 0412 by Bisi Gibbons RN  Outcome: Progressing  Intervention: Optimize Skin Protection  Recent Flowsheet Documentation  Taken 5/4/2025 2051 by Bisi Gibbons RN  Activity Management: activity encouraged  Pressure Reduction Techniques:   heels elevated off bed   frequent weight shift encouraged  Pressure Reduction Devices: heel offloading device utilized     Problem: Comorbidity Management  Goal: Maintenance of Heart Failure Symptom Control  5/5/2025 0435 by Bisi Gibbons RN  Outcome: Progressing  5/5/2025 0434 by Bisi Gibbnos, RN  Outcome: Progressing  5/5/2025 0433 by Bisi Gibbons RN  Outcome: Progressing  5/5/2025 0433 by Bisi Gibbons RN  Outcome: Not Progressing  5/5/2025 0432 by Bisi Gibbons RN  Outcome: Progressing  5/5/2025 0432 by Bisi Gibbons, RN  Outcome: Progressing  5/5/2025 0412 by Bisi Gibbons RN  Outcome: Progressing     Problem: Fall Injury Risk  Goal: Absence of Fall and Fall-Related Injury  5/5/2025 0435 by Bisi Gibbons RN  Outcome: Progressing  5/5/2025 0434 by Bisi Gibbons RN  Outcome: Progressing  5/5/2025 0433 by Bisi Gibbons, RN  Outcome: Progressing  5/5/2025 0433 by Bisi Gibbons, RN  Outcome: Not Progressing  5/5/2025 0432 by Bisi Gibbons, RN  Outcome: Progressing  5/5/2025 0432 by Bisi Gibbons, RN  Outcome: Progressing  5/5/2025 0412 by Bisi Gibbons RN  Outcome: Progressing  Intervention: Identify and Manage Contributors  Recent Flowsheet Documentation  Taken 5/4/2025 2051 by Bisi Gibbons, RN  Medication Review/Management: medications reviewed  Intervention: Promote Injury-Free Environment  Recent Flowsheet  Documentation  Taken 5/5/2025 0300 by Bisi Gibbons RN  Safety Promotion/Fall Prevention: safety round/check completed  Taken 5/5/2025 0100 by Bisi Gibbons RN  Safety Promotion/Fall Prevention: safety round/check completed  Taken 5/4/2025 2300 by Bisi Gibbons RN  Safety Promotion/Fall Prevention: safety round/check completed  Taken 5/4/2025 2100 by Bisi Gibbons RN  Safety Promotion/Fall Prevention: safety round/check completed  Taken 5/4/2025 2051 by Bisi Gibbons RN  Safety Promotion/Fall Prevention:   assistive device/personal items within reach   clutter free environment maintained   room organization consistent   safety round/check completed   fall prevention program maintained  Taken 5/4/2025 1900 by Bisi Gibbons RN  Safety Promotion/Fall Prevention: safety round/check completed   Goal Outcome Evaluation:  Plan of Care Reviewed With: patient        Progress: improving  Outcome Evaluation: Pt resting in bed at this time. PRN pain meds given. A&Ox4. VSS on RA. IVF infusing. No concerns or requests at this time. Will continue plan of care.

## 2025-05-05 NOTE — SIGNIFICANT NOTE
05/05/25 0922   OTHER   Discipline physical therapist   Rehab Time/Intention   Session Not Performed other (see comments)  (Pt. to undergo R tibial plateau ORIF tomorrow per Dr. Harrell.  PT will await post-op orders.)

## 2025-05-06 ENCOUNTER — ANESTHESIA (OUTPATIENT)
Dept: PERIOP | Facility: HOSPITAL | Age: 80
End: 2025-05-06
Payer: MEDICARE

## 2025-05-06 ENCOUNTER — ANESTHESIA EVENT (OUTPATIENT)
Dept: PERIOP | Facility: HOSPITAL | Age: 80
End: 2025-05-06
Payer: MEDICARE

## 2025-05-06 ENCOUNTER — APPOINTMENT (OUTPATIENT)
Dept: GENERAL RADIOLOGY | Facility: HOSPITAL | Age: 80
DRG: 488 | End: 2025-05-06
Payer: MEDICARE

## 2025-05-06 LAB
GLUCOSE BLDC GLUCOMTR-MCNC: 121 MG/DL (ref 70–130)
QT INTERVAL: 390 MS
QTC INTERVAL: 497 MS

## 2025-05-06 PROCEDURE — 25810000003 LACTATED RINGERS PER 1000 ML: Performed by: ANESTHESIOLOGY

## 2025-05-06 PROCEDURE — C1713 ANCHOR/SCREW BN/BN,TIS/BN: HCPCS | Performed by: ORTHOPAEDIC SURGERY

## 2025-05-06 PROCEDURE — 82948 REAGENT STRIP/BLOOD GLUCOSE: CPT

## 2025-05-06 PROCEDURE — 0SQD0ZZ REPAIR LEFT KNEE JOINT, OPEN APPROACH: ICD-10-PCS | Performed by: ORTHOPAEDIC SURGERY

## 2025-05-06 PROCEDURE — 25010000002 FENTANYL CITRATE (PF) 50 MCG/ML SOLUTION: Performed by: NURSE ANESTHETIST, CERTIFIED REGISTERED

## 2025-05-06 PROCEDURE — 25010000002 PROPOFOL 200 MG/20ML EMULSION: Performed by: NURSE ANESTHETIST, CERTIFIED REGISTERED

## 2025-05-06 PROCEDURE — 25010000002 ONDANSETRON PER 1 MG: Performed by: NURSE ANESTHETIST, CERTIFIED REGISTERED

## 2025-05-06 PROCEDURE — 25010000002 MORPHINE PER 10 MG: Performed by: ORTHOPAEDIC SURGERY

## 2025-05-06 PROCEDURE — 76000 FLUOROSCOPY <1 HR PHYS/QHP: CPT

## 2025-05-06 PROCEDURE — 25010000002 CEFAZOLIN PER 500 MG: Performed by: ORTHOPAEDIC SURGERY

## 2025-05-06 PROCEDURE — 0QSG04Z REPOSITION RIGHT TIBIA WITH INTERNAL FIXATION DEVICE, OPEN APPROACH: ICD-10-PCS | Performed by: ORTHOPAEDIC SURGERY

## 2025-05-06 PROCEDURE — 25010000002 VANCOMYCIN 1 G RECONSTITUTED SOLUTION: Performed by: ORTHOPAEDIC SURGERY

## 2025-05-06 PROCEDURE — 25010000002 FAMOTIDINE (PF) 20 MG/2ML SOLUTION: Performed by: NURSE ANESTHETIST, CERTIFIED REGISTERED

## 2025-05-06 PROCEDURE — 25810000003 LACTATED RINGERS PER 1000 ML: Performed by: NURSE ANESTHETIST, CERTIFIED REGISTERED

## 2025-05-06 PROCEDURE — 99232 SBSQ HOSP IP/OBS MODERATE 35: CPT | Performed by: INTERNAL MEDICINE

## 2025-05-06 PROCEDURE — 25010000002 MORPHINE PER 10 MG: Performed by: INTERNAL MEDICINE

## 2025-05-06 DEVICE — SCRW CORT S/TAP 3.5X44MM: Type: IMPLANTABLE DEVICE | Site: TIBIA | Status: FUNCTIONAL

## 2025-05-06 DEVICE — BONE CANC CRUSHED FZD 15CC: Type: IMPLANTABLE DEVICE | Site: TIBIA | Status: FUNCTIONAL

## 2025-05-06 DEVICE — IMPLANTABLE DEVICE: Type: IMPLANTABLE DEVICE | Site: TIBIA | Status: FUNCTIONAL

## 2025-05-06 DEVICE — SCRW CORT S/TAP 3.5X34MM: Type: IMPLANTABLE DEVICE | Site: TIBIA | Status: FUNCTIONAL

## 2025-05-06 DEVICE — SCRW LK VA/LCP S/TAP STRDRV 3.5X75MM: Type: IMPLANTABLE DEVICE | Site: TIBIA | Status: FUNCTIONAL

## 2025-05-06 DEVICE — SCRW LK VA/LCP S/TAP STRDRV 3.5X60MM: Type: IMPLANTABLE DEVICE | Site: TIBIA | Status: FUNCTIONAL

## 2025-05-06 DEVICE — VIOLET ANTIBACTERIAL POLYDIOXANONE, KNOTLESS TISSUE CONTROL DEVICE
Type: IMPLANTABLE DEVICE | Site: TIBIA | Status: FUNCTIONAL
Brand: STRATAFIX

## 2025-05-06 RX ORDER — MIDAZOLAM HYDROCHLORIDE 1 MG/ML
0.5 INJECTION, SOLUTION INTRAMUSCULAR; INTRAVENOUS
Status: DISCONTINUED | OUTPATIENT
Start: 2025-05-06 | End: 2025-05-06 | Stop reason: HOSPADM

## 2025-05-06 RX ORDER — SODIUM CHLORIDE 0.9 % (FLUSH) 0.9 %
10 SYRINGE (ML) INJECTION EVERY 12 HOURS SCHEDULED
Status: DISCONTINUED | OUTPATIENT
Start: 2025-05-06 | End: 2025-05-06 | Stop reason: HOSPADM

## 2025-05-06 RX ORDER — FENTANYL CITRATE 50 UG/ML
INJECTION, SOLUTION INTRAMUSCULAR; INTRAVENOUS AS NEEDED
Status: DISCONTINUED | OUTPATIENT
Start: 2025-05-06 | End: 2025-05-06 | Stop reason: SURG

## 2025-05-06 RX ORDER — IPRATROPIUM BROMIDE AND ALBUTEROL SULFATE 2.5; .5 MG/3ML; MG/3ML
3 SOLUTION RESPIRATORY (INHALATION) ONCE AS NEEDED
Status: DISCONTINUED | OUTPATIENT
Start: 2025-05-06 | End: 2025-05-06 | Stop reason: HOSPADM

## 2025-05-06 RX ORDER — ONDANSETRON 2 MG/ML
INJECTION INTRAMUSCULAR; INTRAVENOUS AS NEEDED
Status: DISCONTINUED | OUTPATIENT
Start: 2025-05-06 | End: 2025-05-06 | Stop reason: SURG

## 2025-05-06 RX ORDER — ENOXAPARIN SODIUM 100 MG/ML
30 INJECTION SUBCUTANEOUS EVERY 12 HOURS
Status: DISCONTINUED | OUTPATIENT
Start: 2025-05-07 | End: 2025-05-09 | Stop reason: HOSPADM

## 2025-05-06 RX ORDER — SODIUM CHLORIDE 0.9 % (FLUSH) 0.9 %
10 SYRINGE (ML) INJECTION AS NEEDED
Status: DISCONTINUED | OUTPATIENT
Start: 2025-05-06 | End: 2025-05-06 | Stop reason: HOSPADM

## 2025-05-06 RX ORDER — VANCOMYCIN HYDROCHLORIDE 1 G/20ML
INJECTION, POWDER, LYOPHILIZED, FOR SOLUTION INTRAVENOUS AS NEEDED
Status: DISCONTINUED | OUTPATIENT
Start: 2025-05-06 | End: 2025-05-06 | Stop reason: HOSPADM

## 2025-05-06 RX ORDER — FENTANYL CITRATE 50 UG/ML
50 INJECTION, SOLUTION INTRAMUSCULAR; INTRAVENOUS
Status: DISCONTINUED | OUTPATIENT
Start: 2025-05-06 | End: 2025-05-06 | Stop reason: HOSPADM

## 2025-05-06 RX ORDER — PROPOFOL 10 MG/ML
INJECTION, EMULSION INTRAVENOUS AS NEEDED
Status: DISCONTINUED | OUTPATIENT
Start: 2025-05-06 | End: 2025-05-06 | Stop reason: SURG

## 2025-05-06 RX ORDER — SODIUM CHLORIDE, SODIUM LACTATE, POTASSIUM CHLORIDE, CALCIUM CHLORIDE 600; 310; 30; 20 MG/100ML; MG/100ML; MG/100ML; MG/100ML
125 INJECTION, SOLUTION INTRAVENOUS ONCE
Status: COMPLETED | OUTPATIENT
Start: 2025-05-06 | End: 2025-05-06

## 2025-05-06 RX ORDER — SODIUM CHLORIDE 9 MG/ML
40 INJECTION, SOLUTION INTRAVENOUS AS NEEDED
Status: DISCONTINUED | OUTPATIENT
Start: 2025-05-06 | End: 2025-05-06 | Stop reason: HOSPADM

## 2025-05-06 RX ORDER — ONDANSETRON 2 MG/ML
4 INJECTION INTRAMUSCULAR; INTRAVENOUS AS NEEDED
Status: DISCONTINUED | OUTPATIENT
Start: 2025-05-06 | End: 2025-05-06 | Stop reason: HOSPADM

## 2025-05-06 RX ORDER — MAGNESIUM HYDROXIDE 1200 MG/15ML
LIQUID ORAL AS NEEDED
Status: DISCONTINUED | OUTPATIENT
Start: 2025-05-06 | End: 2025-05-06 | Stop reason: HOSPADM

## 2025-05-06 RX ORDER — SODIUM CHLORIDE, SODIUM LACTATE, POTASSIUM CHLORIDE, CALCIUM CHLORIDE 600; 310; 30; 20 MG/100ML; MG/100ML; MG/100ML; MG/100ML
INJECTION, SOLUTION INTRAVENOUS CONTINUOUS PRN
Status: DISCONTINUED | OUTPATIENT
Start: 2025-05-06 | End: 2025-05-06 | Stop reason: SURG

## 2025-05-06 RX ORDER — MEPERIDINE HYDROCHLORIDE 25 MG/ML
12.5 INJECTION INTRAMUSCULAR; INTRAVENOUS; SUBCUTANEOUS
Status: DISCONTINUED | OUTPATIENT
Start: 2025-05-06 | End: 2025-05-06 | Stop reason: HOSPADM

## 2025-05-06 RX ORDER — FAMOTIDINE 10 MG/ML
INJECTION, SOLUTION INTRAVENOUS AS NEEDED
Status: DISCONTINUED | OUTPATIENT
Start: 2025-05-06 | End: 2025-05-06 | Stop reason: SURG

## 2025-05-06 RX ORDER — SODIUM CHLORIDE, SODIUM LACTATE, POTASSIUM CHLORIDE, CALCIUM CHLORIDE 600; 310; 30; 20 MG/100ML; MG/100ML; MG/100ML; MG/100ML
100 INJECTION, SOLUTION INTRAVENOUS ONCE AS NEEDED
Status: DISCONTINUED | OUTPATIENT
Start: 2025-05-06 | End: 2025-05-06 | Stop reason: HOSPADM

## 2025-05-06 RX ADMIN — DIAZEPAM 5 MG: 5 TABLET ORAL at 00:09

## 2025-05-06 RX ADMIN — SOTALOL HYDROCHLORIDE 80 MG: 80 TABLET ORAL at 08:50

## 2025-05-06 RX ADMIN — CEFAZOLIN 2000 MG: 2 INJECTION, POWDER, FOR SOLUTION INTRAMUSCULAR; INTRAVENOUS at 15:21

## 2025-05-06 RX ADMIN — SODIUM CHLORIDE, POTASSIUM CHLORIDE, SODIUM LACTATE AND CALCIUM CHLORIDE: 600; 310; 30; 20 INJECTION, SOLUTION INTRAVENOUS at 15:21

## 2025-05-06 RX ADMIN — ONDANSETRON 4 MG: 2 INJECTION INTRAMUSCULAR; INTRAVENOUS at 15:21

## 2025-05-06 RX ADMIN — FENTANYL CITRATE 50 MCG: 50 INJECTION INTRAMUSCULAR; INTRAVENOUS at 15:21

## 2025-05-06 RX ADMIN — FENTANYL CITRATE 50 MCG: 50 INJECTION INTRAMUSCULAR; INTRAVENOUS at 15:30

## 2025-05-06 RX ADMIN — PROPOFOL 100 MG: 10 INJECTION, EMULSION INTRAVENOUS at 15:23

## 2025-05-06 RX ADMIN — FENTANYL CITRATE 50 MCG: 50 INJECTION, SOLUTION INTRAMUSCULAR; INTRAVENOUS at 18:05

## 2025-05-06 RX ADMIN — Medication 10 ML: at 21:12

## 2025-05-06 RX ADMIN — Medication 10 ML: at 08:54

## 2025-05-06 RX ADMIN — OXYCODONE 5 MG: 5 TABLET ORAL at 21:12

## 2025-05-06 RX ADMIN — FAMOTIDINE 20 MG: 10 INJECTION, SOLUTION INTRAVENOUS at 15:21

## 2025-05-06 RX ADMIN — ONDANSETRON 4 MG: 2 INJECTION INTRAMUSCULAR; INTRAVENOUS at 17:51

## 2025-05-06 RX ADMIN — SODIUM CHLORIDE, POTASSIUM CHLORIDE, SODIUM LACTATE AND CALCIUM CHLORIDE 125 ML/HR: 600; 310; 30; 20 INJECTION, SOLUTION INTRAVENOUS at 15:17

## 2025-05-06 RX ADMIN — MORPHINE SULFATE 1 MG: 2 INJECTION, SOLUTION INTRAMUSCULAR; INTRAVENOUS at 10:37

## 2025-05-06 RX ADMIN — MORPHINE SULFATE 1 MG: 2 INJECTION, SOLUTION INTRAMUSCULAR; INTRAVENOUS at 19:34

## 2025-05-06 RX ADMIN — SENNOSIDES AND DOCUSATE SODIUM 2 TABLET: 50; 8.6 TABLET ORAL at 21:12

## 2025-05-06 RX ADMIN — SOTALOL HYDROCHLORIDE 80 MG: 80 TABLET ORAL at 21:13

## 2025-05-06 NOTE — PLAN OF CARE
Goal Outcome Evaluation:  Plan of Care Reviewed With: patient        Progress: no change  Outcome Evaluation: Patient resting in bed at this time. VSS on RA. Pt c/o pain, PRN pain medication given per MAR. Pt has been NPO since midnight, surgery bath completed. No concerns or complaints at this time. Will continue with plan of care.

## 2025-05-06 NOTE — ANESTHESIA PROCEDURE NOTES
Airway  Reason: elective    Date/Time: 5/6/2025 3:25 PM  End Time:5/6/2025 3:25 PM    General Information and Staff    Patient location during procedure: OR  CRNA/CAA: Rock Graves CRNA    Indications and Patient Condition  Indications for airway management: airway protection    Preoxygenated: yes  MILS maintained throughout    Mask difficulty assessment: 0 - not attempted    Final Airway Details    Final airway type: supraglottic airway      Successful airway: unique  Size: 4  Airway Seal Pressure (cm H2O): 20   Number of attempts at approach: 1  Assessment: lips, teeth, and gum same as pre-op and atraumatic intubation    Additional Comments  Atraumatic

## 2025-05-06 NOTE — ANESTHESIA PREPROCEDURE EVALUATION
Anesthesia Evaluation     no history of anesthetic complications:   NPO Solid Status: > 8 hours  NPO Liquid Status: > 8 hours           Airway   Mallampati: II  TM distance: >3 FB  Neck ROM: full  No difficulty expected  Dental    (+) poor dentition    Pulmonary - normal exam   Cardiovascular - normal exam    (+) dysrhythmias Atrial Fib      Neuro/Psych  (+) psychiatric history  GI/Hepatic/Renal/Endo    (+) hiatal hernia, GERD    Musculoskeletal     (+) back pain  Abdominal  - normal exam    Bowel sounds: normal.   Substance History      OB/GYN          Other   arthritis,                 Anesthesia Plan    ASA 3     general     intravenous induction     Anesthetic plan, risks, benefits, and alternatives have been provided, discussed and informed consent has been obtained with: patient.    CODE STATUS:    Code Status (Patient has no pulse and is not breathing): CPR (Attempt to Resuscitate)  Medical Interventions (Patient has pulse or is breathing): Full Support

## 2025-05-06 NOTE — PROGRESS NOTES
[facility] HOSPITALIST PROGRESS NOTE    Subjective     History:   Johanna Street is a 79 y.o. female admitted on 5/2/2025 secondary to Tibial plateau fracture, right     Procedures: None    CC: Follow up right tibial plateau fracture     Patient seen and examined with СВЕТЛАНА Maki. Awake and alert with family present at bedside. Reports some pain and spasms in RLE. No reported CP. No reported vomiting. No acute events reported.     History taken from: patient, chart, and RN.      Objective     Vital Signs  Temp:  [98.3 °F (36.8 °C)-98.6 °F (37 °C)] 98.6 °F (37 °C)  Heart Rate:  [103-114] 114  Resp:  [20-22] 20  BP: (109-149)/(59-85) 125/78    Intake/Output Summary (Last 24 hours) at 5/6/2025 1301  Last data filed at 5/6/2025 0850  Gross per 24 hour   Intake 360 ml   Output 1000 ml   Net -640 ml         Physical Exam: Unchanged from previous.   General:    Awake, alert, in no acute distress   Heart:      Normal S1 and S2. Irregularly irregular.    Lungs:     Respirations regular, even and unlabored. Lungs clear to auscultation B/L. No wheezes, rales or rhonchi.   Abdomen:   Soft and nontender. No guarding, rebound tenderness or  organomegaly noted. Bowel sounds present x 4.   Extremities:  No clubbing, cyanosis or  LLE edema noted. (+) knee immobilizer RLE.      Results Review:    Results from last 7 days   Lab Units 05/05/25  0015 05/03/25  0107 05/02/25  1633   WBC 10*3/mm3 9.71 13.41* 11.72*   HEMOGLOBIN g/dL 10.3* 12.1 13.7   PLATELETS 10*3/mm3 202 225 286     Results from last 7 days   Lab Units 05/05/25  1038 05/05/25  0015 05/03/25  0107 05/02/25  1633   SODIUM mmol/L  --  138 131* 135*   POTASSIUM mmol/L 4.3 3.6 5.0 4.3   CHLORIDE mmol/L  --  106 96* 97*   CO2 mmol/L  --  20.6* 21.0* 21.7*   BUN mg/dL  --  8 11 11   CREATININE mg/dL  --  0.68 0.89 0.96   CALCIUM mg/dL  --  8.1* 9.1 9.7   GLUCOSE mg/dL  --  144* 138* 160*     Results from last 7 days   Lab Units 05/03/25  0107 05/02/25  1588    BILIRUBIN mg/dL 1.4* 1.1   ALK PHOS U/L 109 120*   AST (SGOT) U/L 33* 20   ALT (SGPT) U/L 16 14     Results from last 7 days   Lab Units 05/02/25  1633   MAGNESIUM mg/dL 2.0               Imaging Results (Last 24 Hours)       ** No results found for the last 24 hours. **              Medications:  acetaminophen, 1,000 mg, Oral, Q8H  diazePAM, 5 mg, Oral, Nightly  furosemide, 20 mg, Oral, Daily  pantoprazole, 40 mg, Oral, Q AM  senna-docusate sodium, 2 tablet, Oral, BID  sodium chloride, 10 mL, Intravenous, Q12H  sotalol, 80 mg, Oral, BID               Assessment & Plan   Right lateral tibial plateau fracture with questionable non-displaced right fibular shaft fracture: Ortho following with plans for ORIF today. Currently NWB with knee immobilizer. Pain control. Ortho input appreciated.     Chronic Afib: Currently rate controlled. Cont sotalol. Holding Eliquis in preparation for ORIF as above.     Essential HTN: BP stable. Cont to monitor.     GERD: Cont PPI.     DVT PPX: SCD to LLE.    Disposition Likely SNF placement pending post-op course.     Maurilio Patel DO  05/06/25  13:01 EDT

## 2025-05-06 NOTE — PLAN OF CARE
Goal Outcome Evaluation:              Outcome Evaluation: Pt currently off flow in surgery.

## 2025-05-06 NOTE — OP NOTE
Johanna Street  [id number]   1945   Date of Procedure: 05/06/25     Preoperative Diagnosis: Right Split-Depressed Lateral Tibial Plateau Fracture    Postoperative Diagnosis: Right Split- Depressed Lateral Tibial Plateau Fracture    Procedure:   1) Right Tibial Plateau Open Reduction and interal fixation          unicondylar (CPT 61825)  2) Open Lateral Meniscus Repair (CPT 89292)      Indications for Surgery: Johanna Street is a very pleasant 79 year old female who sustained a fall on the right lower extremity. Xrays demonstrated a significantly depressed lateral tibial plateau fracture with comminution. The patient was admitted to [facility] ER service. It was discussed that the fracture will require open reduction and internal fixation. Eliquis was held preoperatively. Risks and benefits of the procedure was discussed with the patient in detail which includes but not limited to infection, wound healing complications, hardware complications, need for hardware removal, nonunion, malunion, osteoarthritis, knee instability, continued pain, complex regional pain syndrome, loss of function to the right lower extremity, vascular injury, nerve injury, foot drop, and also anesthetic risks such as stroke, PE, DVT, ileus, and even death. The patient understands these risk and consent was obtained for right tibial plateau open reduction and internal fixation.    Surgeon(s): Bon Schwartz,      Anesthesia: General    EBL: 100ml    Preoperative Antibiotic: 2 g of ancef was administered by the anesthesia staff prior to incision     Complications: None     Disposition: Stable to the recovery room    Operative Procedure: The patient was taken to the operating room and placed supine on the operating room table. A timeout was performed to verify the appropriate location, patient name, and intended procedure. 2 g of ancef was administered by the anesthesia staff for preoperative prophylactic antibiotic  therapy. All bony prominences were well-padded. Anesthesia used an LMA for airway management during this procedure.  A curvilinear incision was carried out anterolaterally along the proximal tibia. Electrocautery ensued with dissection down to the tibialis anterior fascia. The fascia was incised in line with its fibers with a small cuff left medially to allow for later repair. A soni elevator was utilized to elevate the tibialis anterior muscle and subperiosteal expose the proximal anterolateral tibial shaft and metaphysis.  Electrocautery was utilized to expose gerdys tubercle and elevate the attachment of the IT band anteriorly and posteriorly. The IT band was then slightly split proximally to the lateral knee joint. A The lateral meniscus was torn from the capsular attachment to the lateral tibial plateau as the proximal fragment was gapped. This was tagged with #vicryl to allow for lateral meniscus repair. The main lateral cortex fragment of the metaphysis was booked open. A soni elevator was utilized to tamp up the articular surface which was comminuted and significantly depressed. A wire was used to hold the articular surface elevated and sunk into the metaphysis. The fracture was copiously irrigated. AP and lateral fluoroscopic views demonstrated good elevation and reduction of the articular surface. The void was then filled with cancellous chips and was tamped superiorly to give support to the articular surface. A synthes anterolateral proximal tibial locking plate was then placed and the main lateral cortex fragment was closed bocka down and held in placed just below the articular surface with multiple k-wires. A bicortical nonlocking screw was placed in the shaft to suck the plate down to the bone. A periarticular clamp was then placed anterolaterally on the plate and the medial femoral condyle to compress the gapping of the tibial plateau. A bicortical cancellous nonlocking screws was placed to compress  the fracture. Multiple locking screws were then placed just under the articular surface for rafting. Multiple locking and nonlocking screws were placed bicortically distally in the shaft and metaphysis for distal fixation. AP and lateral fluoroscopic views demonstrated anatomic reduction of the fracture and the hardware was in good position. The lateral meniscus was then brought inferiorly and repaired to the proximal aspect of the plate. 1 gram of vancomycin powder was applied throughout the surgical bed. The IT band was closed with #1 stratafix. The wound was then closed with #1 vicyl followed by 2-0 monocyrl and a a combination of skin staples and 2-0 nylon. A sterile dressing and ace bandage was then placed followed by a knee immobilizer. The patient with remain non-weight bearing to the right lower extremity and begin DVT prophylaxis on POD #1.       Bon Schwartz DO  05/06/25  18:07 EDT

## 2025-05-06 NOTE — CASE MANAGEMENT/SOCIAL WORK
Discharge Planning Assessment   Jarett     Patient Name: Johanna Street  MRN: [id number]  Today's Date: 5/6/2025    Admit Date: 5/2/2025    Plan: Pt down in surgery on this date. SS to follow up with pt on 5/7/25. SS to follow.   Discharge Plan       Row Name 05/06/25 1507       Plan    Plan Pt down in surgery on this date. SS to follow up with pt on 5/7/25. SS to follow.        Expected Discharge Date and Time       Expected Discharge Date Expected Discharge Time    May 8, 2025        IHSAN Marin

## 2025-05-06 NOTE — ANESTHESIA POSTPROCEDURE EVALUATION
Patient: Johanna Street    Procedure Summary       Date: 05/06/25 Room / Location: [facility] OR 01 /  [facility] OR    Anesthesia Start: 1521 Anesthesia Stop: 1737    Procedure: TIBIAL PLATEAU OPEN REDUCTION INTERNAL FIXATION (Right: Leg Lower) Diagnosis:       Closed fracture of right tibial plateau, initial encounter      (Closed fracture of right tibial plateau, initial encounter [S82.141A])    Surgeons: Bon Harrell DO Provider: Mode Roe MD    Anesthesia Type: general ASA Status: 3            Anesthesia Type: general    Vitals  Vitals Value Taken Time   /85 05/06/25 18:16   Temp 97.9 °F (36.6 °C) 05/06/25 18:16   Pulse 110 05/06/25 18:17   Resp 15 05/06/25 18:16   SpO2 93 % 05/06/25 18:17   Vitals shown include unfiled device data.        Post Anesthesia Care and Evaluation    Patient location during evaluation: PHASE II  Patient participation: complete - patient participated  Level of consciousness: awake and alert  Pain score: 1  Pain management: adequate    Airway patency: patent  Anesthetic complications: No anesthetic complications  PONV Status: controlled  Cardiovascular status: acceptable  Respiratory status: acceptable  Hydration status: acceptable

## 2025-05-07 LAB
ALBUMIN SERPL-MCNC: 3 G/DL (ref 3.5–5.2)
ALBUMIN/GLOB SERPL: 0.9 G/DL
ALP SERPL-CCNC: 135 U/L (ref 39–117)
ALT SERPL W P-5'-P-CCNC: 23 U/L (ref 1–33)
ANION GAP SERPL CALCULATED.3IONS-SCNC: 12.4 MMOL/L (ref 5–15)
AST SERPL-CCNC: 45 U/L (ref 1–32)
BASOPHILS # BLD AUTO: 0.06 10*3/MM3 (ref 0–0.2)
BASOPHILS NFR BLD AUTO: 0.5 % (ref 0–1.5)
BILIRUB SERPL-MCNC: 1 MG/DL (ref 0–1.2)
BUN SERPL-MCNC: 9 MG/DL (ref 8–23)
BUN/CREAT SERPL: 11.4 (ref 7–25)
CALCIUM SPEC-SCNC: 8.9 MG/DL (ref 8.6–10.5)
CHLORIDE SERPL-SCNC: 96 MMOL/L (ref 98–107)
CO2 SERPL-SCNC: 25.6 MMOL/L (ref 22–29)
CREAT SERPL-MCNC: 0.79 MG/DL (ref 0.57–1)
DEPRECATED RDW RBC AUTO: 48.8 FL (ref 37–54)
EGFRCR SERPLBLD CKD-EPI 2021: 76.7 ML/MIN/1.73
EOSINOPHIL # BLD AUTO: 0.17 10*3/MM3 (ref 0–0.4)
EOSINOPHIL NFR BLD AUTO: 1.4 % (ref 0.3–6.2)
ERYTHROCYTE [DISTWIDTH] IN BLOOD BY AUTOMATED COUNT: 13.7 % (ref 12.3–15.4)
GLOBULIN UR ELPH-MCNC: 3.4 GM/DL
GLUCOSE BLDC GLUCOMTR-MCNC: 111 MG/DL (ref 70–130)
GLUCOSE SERPL-MCNC: 127 MG/DL (ref 65–99)
HCT VFR BLD AUTO: 36.2 % (ref 34–46.6)
HGB BLD-MCNC: 11.3 G/DL (ref 12–15.9)
IMM GRANULOCYTES # BLD AUTO: 0.05 10*3/MM3 (ref 0–0.05)
IMM GRANULOCYTES NFR BLD AUTO: 0.4 % (ref 0–0.5)
LYMPHOCYTES # BLD AUTO: 1.9 10*3/MM3 (ref 0.7–3.1)
LYMPHOCYTES NFR BLD AUTO: 15.6 % (ref 19.6–45.3)
MCH RBC QN AUTO: 30.1 PG (ref 26.6–33)
MCHC RBC AUTO-ENTMCNC: 31.2 G/DL (ref 31.5–35.7)
MCV RBC AUTO: 96.5 FL (ref 79–97)
MONOCYTES # BLD AUTO: 0.63 10*3/MM3 (ref 0.1–0.9)
MONOCYTES NFR BLD AUTO: 5.2 % (ref 5–12)
NEUTROPHILS NFR BLD AUTO: 76.9 % (ref 42.7–76)
NEUTROPHILS NFR BLD AUTO: 9.38 10*3/MM3 (ref 1.7–7)
NRBC BLD AUTO-RTO: 0 /100 WBC (ref 0–0.2)
PLATELET # BLD AUTO: 309 10*3/MM3 (ref 140–450)
PMV BLD AUTO: 9.6 FL (ref 6–12)
POTASSIUM SERPL-SCNC: 5.2 MMOL/L (ref 3.5–5.2)
PROT SERPL-MCNC: 6.4 G/DL (ref 6–8.5)
RBC # BLD AUTO: 3.75 10*6/MM3 (ref 3.77–5.28)
SODIUM SERPL-SCNC: 134 MMOL/L (ref 136–145)
WBC NRBC COR # BLD AUTO: 12.19 10*3/MM3 (ref 3.4–10.8)

## 2025-05-07 PROCEDURE — 93005 ELECTROCARDIOGRAM TRACING: CPT | Performed by: INTERNAL MEDICINE

## 2025-05-07 PROCEDURE — 99232 SBSQ HOSP IP/OBS MODERATE 35: CPT | Performed by: INTERNAL MEDICINE

## 2025-05-07 PROCEDURE — 25010000002 ENOXAPARIN PER 10 MG: Performed by: ORTHOPAEDIC SURGERY

## 2025-05-07 PROCEDURE — 97163 PT EVAL HIGH COMPLEX 45 MIN: CPT

## 2025-05-07 PROCEDURE — 93005 ELECTROCARDIOGRAM TRACING: CPT

## 2025-05-07 PROCEDURE — 25010000002 CEFAZOLIN PER 500 MG: Performed by: ORTHOPAEDIC SURGERY

## 2025-05-07 PROCEDURE — 25010000002 DIGOXIN PER 500 MCG: Performed by: INTERNAL MEDICINE

## 2025-05-07 PROCEDURE — 80053 COMPREHEN METABOLIC PANEL: CPT | Performed by: ORTHOPAEDIC SURGERY

## 2025-05-07 PROCEDURE — 85025 COMPLETE CBC W/AUTO DIFF WBC: CPT | Performed by: ORTHOPAEDIC SURGERY

## 2025-05-07 PROCEDURE — 93010 ELECTROCARDIOGRAM REPORT: CPT | Performed by: STUDENT IN AN ORGANIZED HEALTH CARE EDUCATION/TRAINING PROGRAM

## 2025-05-07 PROCEDURE — 97166 OT EVAL MOD COMPLEX 45 MIN: CPT

## 2025-05-07 PROCEDURE — 82948 REAGENT STRIP/BLOOD GLUCOSE: CPT

## 2025-05-07 PROCEDURE — 25010000002 MORPHINE PER 10 MG: Performed by: ORTHOPAEDIC SURGERY

## 2025-05-07 RX ORDER — DIGOXIN 0.25 MG/ML
250 INJECTION INTRAMUSCULAR; INTRAVENOUS ONCE
Status: COMPLETED | OUTPATIENT
Start: 2025-05-07 | End: 2025-05-07

## 2025-05-07 RX ADMIN — PANTOPRAZOLE SODIUM 40 MG: 40 TABLET, DELAYED RELEASE ORAL at 05:25

## 2025-05-07 RX ADMIN — SENNOSIDES AND DOCUSATE SODIUM 2 TABLET: 50; 8.6 TABLET ORAL at 08:05

## 2025-05-07 RX ADMIN — Medication 10 ML: at 22:39

## 2025-05-07 RX ADMIN — DIGOXIN 250 MCG: 0.25 INJECTION INTRAMUSCULAR; INTRAVENOUS at 19:32

## 2025-05-07 RX ADMIN — FUROSEMIDE 20 MG: 20 TABLET ORAL at 08:05

## 2025-05-07 RX ADMIN — SOTALOL HYDROCHLORIDE 80 MG: 80 TABLET ORAL at 08:05

## 2025-05-07 RX ADMIN — CEFAZOLIN 2000 MG: 2 INJECTION, POWDER, FOR SOLUTION INTRAMUSCULAR; INTRAVENOUS at 16:45

## 2025-05-07 RX ADMIN — ENOXAPARIN SODIUM 30 MG: 30 INJECTION SUBCUTANEOUS at 12:19

## 2025-05-07 RX ADMIN — MORPHINE SULFATE 1 MG: 2 INJECTION, SOLUTION INTRAMUSCULAR; INTRAVENOUS at 05:24

## 2025-05-07 RX ADMIN — SOTALOL HYDROCHLORIDE 80 MG: 80 TABLET ORAL at 22:39

## 2025-05-07 RX ADMIN — ENOXAPARIN SODIUM 30 MG: 30 INJECTION SUBCUTANEOUS at 01:02

## 2025-05-07 RX ADMIN — ACETAMINOPHEN 1000 MG: 500 TABLET ORAL at 08:05

## 2025-05-07 RX ADMIN — METHOCARBAMOL 500 MG: 500 TABLET ORAL at 08:05

## 2025-05-07 RX ADMIN — MORPHINE SULFATE 1 MG: 2 INJECTION, SOLUTION INTRAMUSCULAR; INTRAVENOUS at 15:04

## 2025-05-07 RX ADMIN — DIAZEPAM 5 MG: 5 TABLET ORAL at 22:39

## 2025-05-07 RX ADMIN — CEFAZOLIN 2000 MG: 2 INJECTION, POWDER, FOR SOLUTION INTRAMUSCULAR; INTRAVENOUS at 08:05

## 2025-05-07 RX ADMIN — SENNOSIDES AND DOCUSATE SODIUM 2 TABLET: 50; 8.6 TABLET ORAL at 22:40

## 2025-05-07 RX ADMIN — CEFAZOLIN 2000 MG: 2 INJECTION, POWDER, FOR SOLUTION INTRAMUSCULAR; INTRAVENOUS at 01:01

## 2025-05-07 RX ADMIN — OXYCODONE 5 MG: 5 TABLET ORAL at 08:05

## 2025-05-07 NOTE — PLAN OF CARE
Goal Outcome Evaluation:  Plan of Care Reviewed With: patient        Progress: no change  Outcome Evaluation: Patient resting in bed at this time. VSS on RA. Pt c/o pain, PRN pain medication given per MAR. Dressing CDI. No concerns or complaints at this time.

## 2025-05-07 NOTE — NURSING NOTE
Walked into the room to check on patient and noticed that the IV site was reddened and swollen. Stopped the IV and enacted extravasation standing orders. Notified MD.

## 2025-05-07 NOTE — DISCHARGE PLACEMENT REQUEST
"Essence Garcia (79 y.o. Female)       Date of Birth   1945    Social Security Number       Address   [address] [place] [address] [place] [address]    Home Phone   [phone]    MRN   [id number]       [facility]    Marital Status                               Admission Date   5/2/2025    Admission Type   Emergency    Admitting Provider   Pia Wren DO    Attending Provider   Maurilio Patel DO    Department, Room/Bed   [address], 3335/1P       Discharge Date       Discharge Disposition       Discharge Destination                                 Attending Provider: Maurilio Patel DO    Allergies: Codeine, Diltiazem, Latex, Zinc, Levaquin [Levofloxacin], Penicillins    Isolation: None   Infection: None   Code Status: CPR    Ht: 154.9 cm (61\")   Wt: 81.2 kg (179 lb 0.2 oz)    Admission Cmt: None   Principal Problem: Tibial plateau fracture, right [S82.141A]                   Active Insurance as of 5/2/2025       Primary Coverage       Payor Plan Insurance Group Employer/Plan Group    ANTHEM MEDICARE REPLACEMENT ANTHEM MEDICARE ADVANTAGE PPO [id number]       Payor Plan Address Payor Plan Phone Number Payor Plan Fax Number Effective Dates    [address] [phone]  5/1/2024 - None Entered    [facility] [id number]         Subscriber Name Subscriber Birth Date Member ID       ESSENCE GARCIA 1945 [id number]                     Emergency Contacts        (Rel.) Home Phone Work Phone Mobile Phone    Rosanne Hargrove (Grandchild) [phone] -- --    MERE OMALLEY (Sister) [phone] -- --    Chapito Heard (Brother) -- -- [phone]                 History & Physical        Nida Cleaning APRN at 05/02/25 2334       Attestation signed by Pia Wren DO at 05/03/25 3215      I have reviewed this documentation, have written and agree. Left lateral tibial plateau fracture s/p mechanical fall/injury prior to admission, " mild, leukocytosis, AGMA and PAF on Eliquis.     Hold eliquis pending further recommendations per Orthopedic Surgery. Consult SS as patient states unable to care for herself acutely. UA to r/o URI, CXR most likely consistent with scarring; procalcitonin level is negative and clinical presentation does not support acute pneumonia.  Will change LR to normal saline and continue for approximately 10 hours.                        [facility] Medicine Services  History & Physical    Patient Identification:  Name:  Johanna Street  Age:  79 y.o.  Sex:  female  :  1945  MRN:  [id number]   Visit Number:  [id number]  Admit Date: 2025   Primary Care Physician:  Bi Youssef MD    Subjective     Chief complaint: RLE Pain    History of presenting illness:      Johanna Street is a 79 y.o. female with past medical history significant for atrial fibrillation, chronic anticoagulation with Eliquis, hypertension, history of TIA, depression, anxiety, history of paraesophageal hernia, history of dysphagia, GERD, arthritis, scoliosis, and chronic back pain. Patient presented to [facility] Emergency Department secondary to injury to her right lower extremity. Patient was getting into her car. She states her leg gave out. She slammed leg between the door and the car. She states that she never did actually fall onto the ground and did not hit her head. Also denies loss of consciousness. Denies associated or precipitating symptoms such as dizziness, chest pain, or shortness of breath. Patient does admit that she's been feeling fatigued recently. States that she has a difficult time using her right arm/right shoulder at baseline due to a remote injury. She states that she broke her right shoulder several years ago and never did have surgery to repair it. Patient states that she lives alone. ED workup positive for right tibial plateau fracture. ED provider discussed with Orthopedic Surgery (Dr. Harrell)  who recommended a knee immobilizer and follow-up outpatient for further management. However, patient stated that she would be unable to take care of herself at home and was concerned that she would experience a fall. Family member at bedside stated that patient could not stay with her as she lives 2 counties away and was unable to provide the help that patient would need. Patient confirms that she does take Eliquis for Afib. Last dose of Eliquis was on the morning of 5/2/2025. She states that she is compliant with all her home medications. Denies smoking, alcohol abuse, or illicit substance use. Discussed admission plans with patient. She voiced agreement and understanding with no further questions or concerns at this time.     Upon arrival to the ED, vital signs were temperature 98.2, pulse 106, respirations 16, blood pressure 140/87 sitting, SpO2 saturation 95% on room air. CMP with sodium 135, chloride 97, CO2 21.7, anion gap 16.3, glucose 160, alkaline phosphatase 120, otherwise unremarkable.  Pro-Sorin negative.  CBC with WBCs 11.72, MCHC 31.1, otherwise unremarkable.  X-ray of the right ankle unremarkable.  Chest x-ray noted bilateral lung reticulation.  Atypical infection versus fibrosis.  X-ray of the right femur unremarkable.  X-ray of the right shoulder unremarkable.  X-ray of the right tib-fib noted tibial plateau fracture with possible fibular shaft fracture.  CT of the right lower extremity without contrast noted lateral tibial plateau fracture with depression.    Known Emergency Department medications received prior to my evaluation included 5 mg oral Valium, 2 mg IV morphine, 80 mg oral sotalol. Emergency Department Room location at the time of my evaluation was 114. Discussed with admitting physician, Pia Larson DO.      ---------------------------------------------------------------------------------------------------------------------   Review of Systems   Constitutional:  Negative for fever.    HENT: Negative.     Respiratory: Negative.     Cardiovascular: Negative.  Negative for chest pain.   Gastrointestinal: Negative.  Negative for abdominal pain.   Endocrine: Negative.    Genitourinary: Negative.  Negative for dysuria.   Musculoskeletal:  Positive for arthralgias and gait problem.   Skin: Negative.    Neurological:  Positive for weakness (generalized).   Psychiatric/Behavioral: Negative.     All other systems reviewed and are negative.    ---------------------------------------------------------------------------------------------------------------------   Past Medical History:   Diagnosis Date    Ankle edema     Anxiety     Arthritis     Atrial fibrillation     Back pain     Depression     Hernia, hiatal     PAF (paroxysmal atrial fibrillation)      Past Surgical History:   Procedure Laterality Date    APPENDECTOMY      CARDIAC CATHETERIZATION  12/2013    CHOLECYSTECTOMY      COLON SURGERY      COLONOSCOPY  2006    ENDOSCOPY N/A 12/7/2017    Procedure: ESOPHAGOGASTRODUODENOSCOPY WITH ANESTHESIA;  Surgeon: Eliseo Moon MD;  Location: [facility] OR;  Service:     ENDOSCOPY N/A 2/21/2018    Procedure: ESOPHAGOGASTRODUODENOSCOPY WITH DILATATION CPT CODE: 41898;  Surgeon: Tomi Dey III, MD;  Location: [facility];  Service:     EYE SURGERY      HYSTERECTOMY      NERVE REPAIR      TONSILLECTOMY       Family History   Problem Relation Age of Onset    Heart attack Mother     Stroke Mother     Coronary artery disease Mother     Diabetes Mother     Hypertension Mother     Heart disease Father     Arthritis Father     Coronary artery disease Father     Liver disease Brother      Social History     Socioeconomic History    Marital status:    Tobacco Use    Smoking status: Never    Smokeless tobacco: Never   Vaping Use    Vaping status: Never Used   Substance and Sexual Activity    Alcohol use: No    Drug use: No    Sexual activity: Defer      ---------------------------------------------------------------------------------------------------------------------   Allergies:  Codeine, Diltiazem, Zinc, Levaquin [levofloxacin], and Penicillins  ---------------------------------------------------------------------------------------------------------------------   Home medications:    Medications below are reported home medications pulling from within the system; at this time, these medications have not been reconciled unless otherwise specified and are in the verification process for further verifcation as current home medications.  Medications Prior to Admission   Medication Sig Dispense Refill Last Dose/Taking    apixaban (ELIQUIS) 5 MG tablet tablet Take 1 tablet by mouth Every 12 (Twelve) Hours. 180 tablet 3 5/2/2025 Morning    diazepam (VALIUM) 5 MG tablet Take 1 tablet by mouth Daily.   5/2/2025    furosemide (LASIX) 20 MG tablet Take 1 tablet by mouth Daily.   5/2/2025    lansoprazole (PREVACID) 15 MG capsule Take 1 capsule by mouth Daily.   5/2/2025    sotalol (BETAPACE) 80 MG tablet Take 1 tablet by mouth 2 (Two) Times a Day.   5/2/2025 Evening    divalproex (DEPAKOTE) 125 MG DR tablet Take 1 tablet by mouth Daily As Needed (Headache).   Unknown       Hospital Scheduled Meds:  acetaminophen, 1,000 mg, Oral, Q8H  senna-docusate sodium, 2 tablet, Oral, BID  sodium chloride, 10 mL, Intravenous, Q12H      lactated ringers, 75 mL/hr, Last Rate: 75 mL/hr (05/03/25 0039)        Current listed hospital scheduled medications may not yet reflect those currently placed in orders that are signed and held awaiting patient's arrival to floor.   ---------------------------------------------------------------------------------------------------------------------     Objective     Vital Signs:  Temp:  [98.2 °F (36.8 °C)] 98.2 °F (36.8 °C)  Heart Rate:  [] 105  Resp:  [16-18] 18  BP: ()/(60-97) 134/79      05/02/25  1545 05/03/25  0002   Weight: 78.5 kg  (173 lb) 81.2 kg (179 lb 0.2 oz)     Body mass index is 33.82 kg/m².  ---------------------------------------------------------------------------------------------------------------------       Physical Exam  Vitals reviewed.   Constitutional:       General: She is awake. She is not in acute distress.     Appearance: She is ill-appearing (chronically). She is not diaphoretic.   HENT:      Head: Normocephalic and atraumatic.      Mouth/Throat:      Mouth: Mucous membranes are dry.   Eyes:      Extraocular Movements: Extraocular movements intact.      Pupils: Pupils are equal, round, and reactive to light.   Cardiovascular:      Rate and Rhythm: Normal rate. Rhythm irregular.      Pulses: Normal pulses.           Dorsalis pedis pulses are 1+ on the right side and 1+ on the left side.      Heart sounds:      No friction rub.   Pulmonary:      Effort: Pulmonary effort is normal. No accessory muscle usage, respiratory distress or retractions.      Breath sounds: No wheezing, rhonchi or rales.   Abdominal:      General: Bowel sounds are normal. There is no distension.      Palpations: Abdomen is soft.      Tenderness: There is no abdominal tenderness. There is no guarding.   Musculoskeletal:      Cervical back: Neck supple. No rigidity.      Right lower leg: No edema.      Left lower leg: No edema.      Comments: RLE neurovascularly intact.    Skin:     General: Skin is warm and dry.      Capillary Refill: Capillary refill takes 2 to 3 seconds.      Coloration: Skin is pale.   Neurological:      Mental Status: She is alert and oriented to person, place, and time. Mental status is at baseline.      Cranial Nerves: No dysarthria or facial asymmetry.      Sensory: Sensation is intact.      Motor: Weakness (generalized) present. No tremor.   Psychiatric:         Attention and Perception: Attention normal.         Mood and Affect: Mood normal.         Speech: Speech normal.         Behavior: Behavior normal. Behavior is  "cooperative.         Thought Content: Thought content normal.         Cognition and Memory: Cognition normal.         Judgment: Judgment normal.       ---------------------------------------------------------------------------------------------------------------------  EKG:  Appearing Afib with controlled rate in the 90s. Fascicular block. LVH. Confirmed by Cardiology, Dr. Spears.     Telemetry:  Patient was not on bedside monitor in  during my evaluation.   ---------------------------------------------------------------------------------------------------------------------   Results from last 7 days   Lab Units 05/02/25  1633   WBC 10*3/mm3 11.72*   HEMOGLOBIN g/dL 13.7   HEMATOCRIT % 44.1   MCV fL 95.2   MCHC g/dL 31.1*   PLATELETS 10*3/mm3 286         Results from last 7 days   Lab Units 05/02/25  1633   SODIUM mmol/L 135*   POTASSIUM mmol/L 4.3   CHLORIDE mmol/L 97*   CO2 mmol/L 21.7*   BUN mg/dL 11   CREATININE mg/dL 0.96   CALCIUM mg/dL 9.7   GLUCOSE mg/dL 160*   ALBUMIN g/dL 4.2   BILIRUBIN mg/dL 1.1   ALK PHOS U/L 120*   AST (SGOT) U/L 20   ALT (SGPT) U/L 14   Estimated Creatinine Clearance: 46.7 mL/min (by C-G formula based on SCr of 0.96 mg/dL).  No results found for: \"AMMONIA\"          Lab Results   Component Value Date    HGBA1C 5.50 08/31/2017     Lab Results   Component Value Date    TSH 2.120 05/04/2019     No results found for: \"PREGTESTUR\", \"PREGSERUM\", \"HCG\", \"HCGQUANT\"  Pain Management Panel           No data to display                  ---------------------------------------------------------------------------------------------------------------------  Imaging Results (Last 7 Days)       Procedure Component Value Units Date/Time    CT Lower Extremity Right Without Contrast [[id number]] Collected: 05/02/25 2004     Updated: 05/02/25 2007    Narrative:      REASON FOR EXAMINATION: Fall trauma pain.     COMPARISON: None available.     TECHNIQUE: CT of the right knee is performed on a " multi-detector CT.  Coronal and sagittal reconstructions were obtained. CT Scan performed  according to as low as reasonably achievable dose protocol.     FINDINGS:  There is an impacted fracture of the lateral right tibial plateau.  Loss  of height is 16 mm image 55 of series 5.  Fracture line also extends at  least to the lateral margin of the the tibial spines image 53 of series  5.  There is osteoarthritis with joint space loss medial femorotibial  joint space as well as chondrocalcinosis.  No fracture of the medial  tibial plateau is seen.  The fibula appears preserved.  Degenerative  change patellofemoral joint particularly laterally.       Impression:      Lateral tibial plateau fracture with depression as described.     This report was finalized on 5/2/2025 8:05 PM by YANNA UMANZOR MD.       XR Femur 2 View Right [[id number]] Collected: 05/02/25 1748     Updated: 05/02/25 1807    Narrative:      Exam: XR FEMUR 2 VW RIGHT-     History: Injury     Comparison: No images available     Findings:     No acute fracture or dislocation.     Soft tissue are normal.       Impression:      Impression:     No acute bony process of the femur.     This report was finalized on 5/2/2025 5:49 PM by Paco Soliman MD.       XR Tibia Fibula 2 View Right [[id number]] Collected: 05/02/25 1749     Updated: 05/02/25 1806    Narrative:      Exam: XR TIBIA FIBULA 2 VW RIGHT-     History: injury     Comparison: No images available     Findings:     Comminuted depressed lateral tibial plateau fracture. Question fibula  shaft fracture.      No other acute fracture or dislocation.     Soft tissue are normal.       Impression:      Impression:     Tibial plateau fracture with possible fibula shaft fracture.      This report was finalized on 5/2/2025 5:50 PM by Paco Soliman MD.       XR Ankle 3+ View Right [[id number]] Collected: 05/02/25 1750     Updated: 05/02/25 1805    Narrative:      Exam: XR ANKLE 3+ VW RIGHT-     History:  injury     Comparison: No images available     Findings:     No acute fracture or dislocation.     Soft tissue are normal.       Impression:      Impression:     No acute bony process.     This report was finalized on 5/2/2025 5:50 PM by Paco Soliman MD.       XR Shoulder 2+ View Right [[id number]] Collected: 05/02/25 1746     Updated: 05/02/25 1749    Narrative:      Exam: XR SHOULDER 2+ VW RIGHT-     History: Injury     Comparison: No images available     Findings:     Severe subacromial space narrowing with chronic humeral neck deformity.      No acute fracture or dislocation.     Soft tissue are normal.       Impression:      Impression:     No acute bony process.     This report was finalized on 5/2/2025 5:47 PM by Paco Soliman MD.       XR Chest 1 View [[id number]] Collected: 05/02/25 1744     Updated: 05/02/25 1749    Narrative:      Procedure: Frontal view of chest obtained.     Comparison: 5/14/2019     History: weakness     Findings:     Bilateral lung reticulation.   Normal heart size and mediastinal contours.  Trachea is in midline position.  No edema or effusion is seen.  There is no evidence of pneumothorax.       Impression:         Bilateral lung reticulation which is new. Atypical infection versus  fibrosis.      This report was finalized on 5/2/2025 5:46 PM by Paco Soliman MD.             Last echocardiogram: No previous echo on file.     I have personally reviewed the above radiology images and read the final radiology report on 05/03/25  ---------------------------------------------------------------------------------------------------------------------  Assessment / Plan     Active Hospital Problems    Diagnosis  POA    **Tibial plateau fracture, right [S82.141A]  Yes       ASSESSMENT/PLAN:  #Acute, lateral tibial plateau fracture status post mechanical fall prior to arrival   #Mild leukocytosis (POA), likely reactive to fracture  #Anion gap metabolic acidosis (POA)  --Radiological images  reviewed.   --Orthopedic surgery consulted, input/assistance is much appreciated.   --PRN IV/PO pain medication available with holding parameters to prevent hypotension, oversedation, and/or respiratory depression.   --Scheduled high-dose Tylenol.  --Neurovascular checks Q 4 hours.   --Fall precautions.   --PT/OT consults placed.   --Case management consulted for assistance with discharge plans. Greatly appreciate their assistance.   --LR infusion at 75 mL an hour x 10 hours per attending.  --Repeat labs in the a.m. (CBC and CMP).    #Atrial fibrillation; EKG obtained on arrival revealed A-fib with controlled rate.  Potassium 4.3.  Obtain magnesium.  Replace electrolytes per protocol as necessary.  Check TSH.  Continuous cardiac monitoring ordered.  Review home rate/rhythm controlling agents once reconciled by pharmacy with plans to continue.  Holding parameters to prevent hypotension and/or bradycardia.  #Chronic anticoagulation with Eliquis; hold Eliquis for now pending formal evaluation by orthopedic surgery.  #Hypertension; BP appears overall stable throughout ED course.  Review home antihypertensive regimen once reconciled by pharmacy with plans to continue.  Holding parameters to prevent hypotension and/or bradycardia.  #History of TIA; no acute focal neurological deficits appreciated on exam.  Patient denies headache or dizziness.  #Depression and anxiety; supportive care.  #History of paraesophageal hernia  #History of dysphagia; SLP consult placed for diet recommendations.  Maintain aspiration precautions.  #GERD; reflux precautions.  PPI.  #Arthritis, scoliosis, and chronic back pain; supportive care.      ----------  -DVT prophylaxis: SCD, Left Leg Only.  Holding Eliquis for now pending formal evaluation by orthopedic surgery.  Last dose of Eliquis reportedly on the morning of 5/2/2025.  -Activity: Bedrest for now. Pending Orthopedic Surgery evaluation.   -Expected length of stay:   OBSERVATION status;  however, if further evaluation or treatment plans warrant, status may change.  Based upon current information, I predict patient's care encounter to be less than or equal to 2 midnights   -Disposition will likely require SNF placement. Case management consulted for assistance with discharge plans (as noted above).     High risk secondary to acute lateral tibial plateau fracture status post mechanical fall PTA, mild leukocytosis, and anion gap metabolic acidosis.     CODE STATUS: FULL CODE      Nida Cleaning, DAVID   05/03/25  01:21 EDT  Pager #[phone]  ---------------------------------------------------------------------------------------------------------------------        Electronically signed by Pia Wren DO at 05/03/25 0533       Vital Signs (last day)       Date/Time Temp Temp src Pulse Resp BP Patient Position SpO2    05/07/25 1422 95.7 (35.4) Oral 108 18 133/89 Lying 98    05/07/25 1038 98.6 (37) Oral -- 18 90/44 Lying 96    05/07/25 0655 98.3 (36.8) Oral 110 18 112/66 Lying 95    05/07/25 0200 97.8 (36.6) Oral -- 18 142/77 Lying --    05/07/25 0015 97.5 (36.4) Oral -- 18 115/68 Lying --    05/06/25 2320 97.5 (36.4) Oral -- 16 119/62 Lying --    05/06/25 2220 97.9 (36.6) Oral -- 16 123/56 Lying 98    05/06/25 2120 98 (36.7) Axillary -- 18 103/51 Lying --    05/06/25 2020 97.6 (36.4) Axillary -- 16 130/78 Lying --    05/06/25 1950 96.9 (36.1) Axillary -- 18 127/74 Lying --    05/06/25 1920 97.8 (36.6) Oral -- 18 137/56 Lying --    05/06/25 1905 97.9 (36.6) Oral -- 16 163/100 Lying --    05/06/25 1850 97.8 (36.6) Oral -- 16 154/89 Lying --    05/06/25 1835 97.4 (36.3) Oral -- 16 115/110 Lying --    05/06/25 1816 97.9 (36.6) Temporal 106 15 138/85 Lying 95    05/06/25 1813 -- -- 103 19 156/97 Lying 96    05/06/25 1808 -- -- 97 14 147/103 Lying 97    05/06/25 1803 -- -- 107 13 149/101 Lying 97    05/06/25 1758 -- -- 95 14 133/95 Lying 96    05/06/25 1753 -- -- 98 16 161/109 Lying 97    05/06/25  1748 -- -- 105 12 139/95 Lying 96    05/06/25 1743 -- -- 94 18 123/84 Lying 96    05/06/25 1738 97.5 (36.4) Temporal 97 16 123/67 Lying 97    05/06/25 1510 98.2 (36.8) Temporal 108 20 134/81 Lying 96    05/06/25 1425 98.2 (36.8) Oral -- 18 134/85 Lying --    05/06/25 1052 98.6 (37) Oral 100 20 125/78 Lying --    05/06/25 0850 -- -- 114 -- 149/85 -- --    05/06/25 0600 98.5 (36.9) Oral 103 20 117/68 Lying --          Lines, Drains & Airways       Active LDAs       Name Placement date Placement time Site Days    Peripheral IV 05/06/25 0900 20 G Anterior;Left Forearm 05/06/25  0900  Forearm  1                  Current Facility-Administered Medications   Medication Dose Route Frequency Provider Last Rate Last Admin    acetaminophen (TYLENOL) tablet 1,000 mg  1,000 mg Oral Q8H Julio Cesar, Vincent A, DO   1,000 mg at 05/07/25 0805    sennosides-docusate (PERICOLACE) 8.6-50 MG per tablet 2 tablet  2 tablet Oral BID Julio Cesar, Vincent A, DO   2 tablet at 05/07/25 0805    And    polyethylene glycol (MIRALAX) packet 17 g  17 g Oral Daily PRN Julio Cesar, Vincent A, DO        And    bisacodyl (DULCOLAX) EC tablet 5 mg  5 mg Oral Daily PRN Julio Cesar, Vincent A, DO        And    bisacodyl (DULCOLAX) suppository 10 mg  10 mg Rectal Daily PRN Julio Cesar, Vincent A, DO        ceFAZolin 2000 mg IVPB in 100 mL NS (VTB)  2,000 mg Intravenous Q8H Julio Cesar, Vincent A, DO   2,000 mg at 05/07/25 0805    dextrose (D50W) (25 g/50 mL) IV injection 25 g  25 g Intravenous Q15 Min PRN Julio Cesar, Vincent A, DO        dextrose (GLUTOSE) oral gel 15 g  15 g Oral Q15 Min PRN Julio Cesar, Vincent A, DO        diazePAM (VALIUM) tablet 5 mg  5 mg Oral Nightly Julio CesarAracelyent A, DO   5 mg at 05/06/25 0009    enoxaparin sodium (LOVENOX) syringe 30 mg  30 mg Subcutaneous Q12H Bon Harrell, DO   30 mg at 05/07/25 1219    furosemide (LASIX) tablet 20 mg  20 mg Oral Daily Bon Harrell, DO   20 mg at 05/07/25 0805    glucagon HCl (Diagnostic) injection 1 mg  1 mg  Subcutaneous Q15 Min PRN Bon Harrell A, DO        Magnesium Standard Dose Replacement - Follow Nurse / BPA Driven Protocol   Not Applicable PRN Julio Cesar, Bon A, DO        methocarbamol (ROBAXIN) tablet 500 mg  500 mg Oral Q8H PRN Bon Harrell A, DO   500 mg at 05/07/25 0805    morphine injection 1 mg  1 mg Intravenous Q3H PRN Bon Harrell A, DO   1 mg at 05/07/25 0524    nitroglycerin (NITROSTAT) SL tablet 0.4 mg  0.4 mg Sublingual Q5 Min PRN Bon Harrell A, DO        oxyCODONE (ROXICODONE) immediate release tablet 5 mg  5 mg Oral Q8H PRN Bon Harrell A, DO   5 mg at 05/07/25 0805    pantoprazole (PROTONIX) EC tablet 40 mg  40 mg Oral Q AM Julio CesarBon paredes, DO   40 mg at 05/07/25 0525    Potassium Replacement - Follow Nurse / BPA Driven Protocol   Not Applicable PRN Bon Harrell A, DO        sodium chloride 0.9 % flush 10 mL  10 mL Intravenous Q12H Bon Harrell A, DO   10 mL at 05/06/25 2112    sodium chloride 0.9 % flush 10 mL  10 mL Intravenous PRN Bon Harrell A, DO        sodium chloride 0.9 % infusion 40 mL  40 mL Intravenous PRN Bon Harrell A, DO   40 mL at 05/05/25 0825    sotalol (BETAPACE) tablet 80 mg  80 mg Oral BID Bon Harrell, DO   80 mg at 05/07/25 0805        Operative/Procedure Notes (most recent note)        Bon Harrell DO at 05/06/25 1807          Johanna Street  [id number]   1945   Date of Procedure: 05/06/25     Preoperative Diagnosis: Right Split-Depressed Lateral Tibial Plateau Fracture    Postoperative Diagnosis: Right Split- Depressed Lateral Tibial Plateau Fracture    Procedure:   1) Right Tibial Plateau Open Reduction and interal fixation          unicondylar (CPT 95832)  2) Open Lateral Meniscus Repair (CPT 26360)      Indications for Surgery: Johanna Street is a very pleasant 79 year old female who sustained a fall on the right lower extremity. Xrays demonstrated a significantly depressed lateral tibial plateau fracture with  comminution. The patient was admitted to [facility] service. It was discussed that the fracture will require open reduction and internal fixation. Eliquis was held preoperatively. Risks and benefits of the procedure was discussed with the patient in detail which includes but not limited to infection, wound healing complications, hardware complications, need for hardware removal, nonunion, malunion, osteoarthritis, knee instability, continued pain, complex regional pain syndrome, loss of function to the right lower extremity, vascular injury, nerve injury, foot drop, and also anesthetic risks such as stroke, PE, DVT, ileus, and even death. The patient understands these risk and consent was obtained for right tibial plateau open reduction and internal fixation.    Surgeon(s): Bon Schwartz DO     Anesthesia: General    EBL: 100ml    Preoperative Antibiotic: 2 g of ancef was administered by the anesthesia staff prior to incision     Complications: None     Disposition: Stable to the recovery room    Operative Procedure: The patient was taken to the operating room and placed supine on the operating room table. A timeout was performed to verify the appropriate location, patient name, and intended procedure. 2 g of ancef was administered by the anesthesia staff for preoperative prophylactic antibiotic therapy. All bony prominences were well-padded. Anesthesia used an LMA for airway management during this procedure.  A curvilinear incision was carried out anterolaterally along the proximal tibia. Electrocautery ensued with dissection down to the tibialis anterior fascia. The fascia was incised in line with its fibers with a small cuff left medially to allow for later repair. A soni elevator was utilized to elevate the tibialis anterior muscle and subperiosteal expose the proximal anterolateral tibial shaft and metaphysis.  Electrocautery was utilized to expose gerdys tubercle and elevate the  attachment of the IT band anteriorly and posteriorly. The IT band was then slightly split proximally to the lateral knee joint. A The lateral meniscus was torn from the capsular attachment to the lateral tibial plateau as the proximal fragment was gapped. This was tagged with #vicryl to allow for lateral meniscus repair. The main lateral cortex fragment of the metaphysis was booked open. A soni elevator was utilized to tamp up the articular surface which was comminuted and significantly depressed. A wire was used to hold the articular surface elevated and sunk into the metaphysis. The fracture was copiously irrigated. AP and lateral fluoroscopic views demonstrated good elevation and reduction of the articular surface. The void was then filled with cancellous chips and was tamped superiorly to give support to the articular surface. A synthes anterolateral proximal tibial locking plate was then placed and the main lateral cortex fragment was closed bocka down and held in placed just below the articular surface with multiple k-wires. A bicortical nonlocking screw was placed in the shaft to suck the plate down to the bone. A periarticular clamp was then placed anterolaterally on the plate and the medial femoral condyle to compress the gapping of the tibial plateau. A bicortical cancellous nonlocking screws was placed to compress the fracture. Multiple locking screws were then placed just under the articular surface for rafting. Multiple locking and nonlocking screws were placed bicortically distally in the shaft and metaphysis for distal fixation. AP and lateral fluoroscopic views demonstrated anatomic reduction of the fracture and the hardware was in good position. The lateral meniscus was then brought inferiorly and repaired to the proximal aspect of the plate. 1 gram of vancomycin powder was applied throughout the surgical bed. The IT band was closed with #1 stratafix. The wound was then closed with #1 vicyl followed  by 2-0 monocyrl and a a combination of skin staples and 2-0 nylon. A sterile dressing and ace bandage was then placed followed by a knee immobilizer. The patient with remain non-weight bearing to the right lower extremity and begin DVT prophylaxis on POD #1.       Bon Schwartz DO  05/06/25  18:07 EDT         Electronically signed by Bon Schwartz DO at 05/06/25 1828          Physician Progress Notes (most recent note)        Maurilio Patel DO at 05/07/25 1249                [facility]    Subjective     History:   Johanna Street is a 79 y.o. female admitted on 5/2/2025 secondary to Tibial plateau fracture, right     Procedures:   5/6/25: Right tibial plateau ORIF and open lateral meniscus repair     CC: Follow up right tibial plateau fracture     Patient seen and examined.  Awake and alert. Some RLE pain and soreness reported. No reported CP. No reported vomiting. No acute events reported.     History taken from: patient, chart, and RN.      Objective     Vital Signs  Temp:  [96.9 °F (36.1 °C)-98.6 °F (37 °C)] 98.6 °F (37 °C)  Heart Rate:  [] 110  Resp:  [12-20] 18  BP: ()/() 90/44    Intake/Output Summary (Last 24 hours) at 5/7/2025 1249  Last data filed at 5/6/2025 1521  Gross per 24 hour   Intake 100 ml   Output 600 ml   Net -500 ml         Physical Exam:   General:    Awake, alert, in no acute distress   Heart:      Normal S1 and S2. Irregularly irregular.    Lungs:     Respirations regular, even and unlabored. Lungs clear to auscultation B/L. No wheezes, rales or rhonchi.   Abdomen:   Soft and nontender. No guarding, rebound tenderness or  organomegaly noted. Bowel sounds present x 4.   Extremities:  No clubbing, cyanosis or  LLE edema noted. (+) knee immobilizer and surgical dressing RLE.      Results Review:    Results from last 7 days   Lab Units 05/07/25  0118 05/05/25  0015 05/03/25  0107 05/02/25  1633   WBC 10*3/mm3 12.19* 9.71  13.41* 11.72*   HEMOGLOBIN g/dL 11.3* 10.3* 12.1 13.7   PLATELETS 10*3/mm3 309 202 225 286     Results from last 7 days   Lab Units 05/07/25  0118 05/05/25  1038 05/05/25  0015 05/03/25  0107 05/02/25  1633   SODIUM mmol/L 134*  --  138 131* 135*   POTASSIUM mmol/L 5.2 4.3 3.6 5.0 4.3   CHLORIDE mmol/L 96*  --  106 96* 97*   CO2 mmol/L 25.6  --  20.6* 21.0* 21.7*   BUN mg/dL 9  --  8 11 11   CREATININE mg/dL 0.79  --  0.68 0.89 0.96   CALCIUM mg/dL 8.9  --  8.1* 9.1 9.7   GLUCOSE mg/dL 127*  --  144* 138* 160*     Results from last 7 days   Lab Units 05/07/25  0118 05/03/25  0107 05/02/25  1633   BILIRUBIN mg/dL 1.0 1.4* 1.1   ALK PHOS U/L 135* 109 120*   AST (SGOT) U/L 45* 33* 20   ALT (SGPT) U/L 23 16 14     Results from last 7 days   Lab Units 05/02/25  1633   MAGNESIUM mg/dL 2.0               Imaging Results (Last 24 Hours)       Procedure Component Value Units Date/Time    FL Surgery Fluoro [[id number]] Collected: 05/07/25 0759     Updated: 05/07/25 0802    Narrative:      EXAMINATION: FL SURGERY FLUORO-      CLINICAL INDICATION: tibfid; S82.141A-Displaced bicondylar fracture of  right tibia, initial encounter for closed fracture        COMPARISON: None available     Total fluoroscopy time 210.3 seconds     Fluoroscopy was provided and 8 images were acquired during surgery     For a full procedural report, please see the report provided by the  performing physician        This report was finalized on 5/7/2025 8:00 AM by Dr. Lane Boothe MD.                 Medications:  acetaminophen, 1,000 mg, Oral, Q8H  ceFAZolin, 2,000 mg, Intravenous, Q8H  diazePAM, 5 mg, Oral, Nightly  enoxaparin sodium, 30 mg, Subcutaneous, Q12H  furosemide, 20 mg, Oral, Daily  pantoprazole, 40 mg, Oral, Q AM  senna-docusate sodium, 2 tablet, Oral, BID  sodium chloride, 10 mL, Intravenous, Q12H  sotalol, 80 mg, Oral, BID               Assessment & Plan   Right lateral tibial plateau fracture with questionable non-displaced right tibia  shaft fracture: S/P right lateral tibial plateau ORIF and lateral meniscus repair. Currently NWB with knee immobilizer. Pain control. Ortho input appreciated.     Chronic Afib: Currently rate controlled. Cont sotalol. Holding Eliquis perioperatively. Restart when okay with ortho.      Essential HTN: BP has fluctuated but overall stable. Cont to monitor.     GERD: Cont PPI.     DVT PPX: Lovenox per ortho recs.    Disposition Likely SNF placement pending post-op course.     Maurilio Patel DO  25  12:49 EDT     Electronically signed by Maurilio Patel DO at 25 1257          Consult Notes (most recent note)        Theresa Iverson APRN at 25 1248        Consult Orders    1. Inpatient Orthopedic Surgery Consult [[id number]] ordered by Pia Wren DO              Attestation signed by Bon Schwartz DO at 25 0738      I have reviewed this documentation and agree.    Plan for right tibial plateau ORIF this coming week while inpatient  Patient will need eliquis held  NWB  Knee immobilizer    Bon Schwartz DO                          Inpatient Orthopedic Surgery Consult  Consult performed by: Theresa Iverson APRN  Consult ordered by: Pia Wren DO          Patient Identification:  Name:  Johanna Steret  Age:  79 y.o.  Sex:  female  :  1945  MRN:  [id number]  Visit Number:  [id number]  Primary care provider:  Bi Youssef MD    History of present illness:  Patient is a 78 y/o female with a PMH significant for A-fib anticoagulated with Eliquis, TIA, HTN, GERD, chronic back pain who presented to [facility] ED following a fall. She fell getting into her car after her leg gave out.  X rays and CT revealed a right lateral tibial plateau fracture.  The patient states she has been feeling generally weak for about the past 4 weeks, since having a severe episode of back pain.  She denies any injury at that time.  She is wearing a knee immobilizer and  states that her knee pain is controlled.    ---------------------------------------------------------------------------------------------------------------------    Review of Systems   Constitutional:  Positive for activity change.   Respiratory:  Negative for shortness of breath.    Cardiovascular:  Negative for chest pain.   Musculoskeletal:  Positive for arthralgias, gait problem and neck pain.   Neurological:  Positive for numbness. Negative for weakness.   All other systems reviewed and are negative.     ---------------------------------------------------------------------------------------------------------------------   Past History:  Family History   Problem Relation Age of Onset    Heart attack Mother     Stroke Mother     Coronary artery disease Mother     Diabetes Mother     Hypertension Mother     Heart disease Father     Arthritis Father     Coronary artery disease Father     Liver disease Brother      Past Medical History:   Diagnosis Date    Ankle edema     Anxiety     Arthritis     Atrial fibrillation     Back pain     Depression     Hernia, hiatal     PAF (paroxysmal atrial fibrillation)      Past Surgical History:   Procedure Laterality Date    APPENDECTOMY      CARDIAC CATHETERIZATION  12/2013    CHOLECYSTECTOMY      COLON SURGERY      COLONOSCOPY  2006    ENDOSCOPY N/A 12/7/2017    Procedure: ESOPHAGOGASTRODUODENOSCOPY WITH ANESTHESIA;  Surgeon: Eliseo Moon MD;  Location: [facility];  Service:     ENDOSCOPY N/A 2/21/2018    Procedure: ESOPHAGOGASTRODUODENOSCOPY WITH DILATATION CPT CODE: 40727;  Surgeon: Tomi Dey III, MD;  Location: [facility];  Service:     EYE SURGERY      HYSTERECTOMY      NERVE REPAIR      TONSILLECTOMY       Social History     Socioeconomic History    Marital status:    Tobacco Use    Smoking status: Never    Smokeless tobacco: Never   Vaping Use    Vaping status: Never Used   Substance and Sexual Activity    Alcohol use: No    Drug use: No    Sexual  activity: Defer     ---------------------------------------------------------------------------------------------------------------------   Allergies:  Codeine, Diltiazem, Zinc, Levaquin [levofloxacin], and Penicillins  ---------------------------------------------------------------------------------------------------------------------   Prior to Admission Medications       Prescriptions Last Dose Informant Patient Reported? Taking?    apixaban (ELIQUIS) 5 MG tablet tablet 5/2/2025 Self, Family Member No Yes    Take 1 tablet by mouth Every 12 (Twelve) Hours.    diazepam (VALIUM) 5 MG tablet 5/2/2025 Self, Family Member Yes Yes    Take 1 tablet by mouth Daily.    furosemide (LASIX) 20 MG tablet 5/2/2025 Self, Family Member Yes Yes    Take 1 tablet by mouth Daily.    lansoprazole (PREVACID) 15 MG capsule 5/2/2025 Self, Family Member Yes Yes    Take 1 capsule by mouth Daily.    sotalol (BETAPACE) 80 MG tablet 5/2/2025 Self, Family Member Yes Yes    Take 1 tablet by mouth 2 (Two) Times a Day.    divalproex (DEPAKOTE) 125 MG DR tablet Unknown Self, Family Member Yes No    Take 1 tablet by mouth Daily As Needed (Headache).          Hospital Meds:  acetaminophen, 1,000 mg, Oral, Q8H  diazePAM, 5 mg, Oral, Daily  furosemide, 20 mg, Oral, Daily  [START ON 5/4/2025] pantoprazole, 40 mg, Oral, Q AM  senna-docusate sodium, 2 tablet, Oral, BID  sodium chloride, 10 mL, Intravenous, Q12H  sotalol, 80 mg, Oral, BID      sodium chloride, 100 mL/hr, Last Rate: 100 mL/hr (05/03/25 1200)      ---------------------------------------------------------------------------------------------------------------------   Vital Signs:  Temp:  [97.6 °F (36.4 °C)-98.2 °F (36.8 °C)] 98 °F (36.7 °C)  Heart Rate:  [] 94  Resp:  [16-18] 16  BP: ()/(59-97) 142/69      05/02/25  1545 05/03/25  0002   Weight: 78.5 kg (173 lb) 81.2 kg (179 lb 0.2 oz)     Body mass index is 33.82  kg/m².  ---------------------------------------------------------------------------------------------------------------------     Physical exam:  Constitutional:  Well-developed and well-nourished.  No acute distress.      HENT:  Head: Normocephalic and atraumatic.  Mouth:  Moist mucous membranes.    Eyes:  Conjunctivae are normal.  Pupils are equal, round, and reactive to light.  No scleral icterus.  Neck:  Neck supple.  Trachea midline.  Cardiovascular:  Normal rate and regular rhythm.  Pulmonary/Chest:  No respiratory distress and good air movement.  Abdominal:  Soft.  No distension and no tenderness.   Musculoskeletal: Right knee: Knee immobilizer.  Mild soft tissue swelling.  Right dorsi and plantarflexion is intact.  +2 DP pulse.           Neurological:  Alert and oriented to person, place, and time.     Skin:  Skin is warm and dry.  No rash noted.  No ecchymosis or abrasion.   Psychiatric:  Normal mood and affect.  Behavior is normal.  Judgment and thought content normal.   Peripheral vascular:  No pitting edema and strong pulses on all 4 extremities.      ---------------------------------------------------------------------------------------------------------------------   .  ---------------------------------------------------------------------------------------------------------------------   Results from last 7 days   Lab Units 05/03/25  0107 05/02/25  1633   WBC 10*3/mm3 13.41* 11.72*   HEMOGLOBIN g/dL 12.1 13.7   HEMATOCRIT % 38.0 44.1   MCV fL 94.8 95.2   MCHC g/dL 31.8 31.1*   PLATELETS 10*3/mm3 225 286         Results from last 7 days   Lab Units 05/03/25  0107 05/02/25  1633   SODIUM mmol/L 131* 135*   POTASSIUM mmol/L 5.0 4.3   MAGNESIUM mg/dL  --  2.0   CHLORIDE mmol/L 96* 97*   CO2 mmol/L 21.0* 21.7*   BUN mg/dL 11 11   CREATININE mg/dL 0.89 0.96   CALCIUM mg/dL 9.1 9.7   GLUCOSE mg/dL 138* 160*   ALBUMIN g/dL 3.5 4.2   BILIRUBIN mg/dL 1.4* 1.1   ALK PHOS U/L 109 120*   AST (SGOT) U/L 33* 20   ALT  "(SGPT) U/L 16 14   Estimated Creatinine Clearance: 50.3 mL/min (by C-G formula based on SCr of 0.89 mg/dL).  No results found for: \"AMMONIA\"          Lab Results   Component Value Date    HGBA1C 5.50 08/31/2017     Lab Results   Component Value Date    TSH 4.760 (H) 05/02/2025     No results found for: \"PREGTESTUR\", \"PREGSERUM\", \"HCG\", \"HCGQUANT\"  Pain Management Panel          Latest Ref Rng & Units 5/3/2025   Pain Management Panel   Amphetamine, Urine Qual Negative Negative    Barbiturates Screen, Urine Negative Negative    Benzodiazepine Screen, Urine Negative Positive    Buprenorphine, Screen, Urine Negative Negative    Cocaine Screen, Urine Negative Negative    Fentanyl, Urine Negative Negative    Methadone Screen , Urine Negative Negative    Methamphetamine, Ur Negative Negative      No results found for: \"BLOODCX\"  No results found for: \"URINECX\"  No results found for: \"WOUNDCX\"  No results found for: \"STOOLCX\"      ---------------------------------------------------------------------------------------------------------------------   Imaging Results (Last 7 Days)       Procedure Component Value Units Date/Time    CT Lower Extremity Right Without Contrast [[id number]] Collected: 05/02/25 2004     Updated: 05/02/25 2007    Narrative:      REASON FOR EXAMINATION: Fall trauma pain.     COMPARISON: None available.     TECHNIQUE: CT of the right knee is performed on a multi-detector CT.  Coronal and sagittal reconstructions were obtained. CT Scan performed  according to as low as reasonably achievable dose protocol.     FINDINGS:  There is an impacted fracture of the lateral right tibial plateau.  Loss  of height is 16 mm image 55 of series 5.  Fracture line also extends at  least to the lateral margin of the the tibial spines image 53 of series  5.  There is osteoarthritis with joint space loss medial femorotibial  joint space as well as chondrocalcinosis.  No fracture of the medial  tibial plateau is seen.  The " fibula appears preserved.  Degenerative  change patellofemoral joint particularly laterally.       Impression:      Lateral tibial plateau fracture with depression as described.     This report was finalized on 5/2/2025 8:05 PM by YANNA MUANZOR MD.       XR Femur 2 View Right [[id number]] Collected: 05/02/25 1748     Updated: 05/02/25 1807    Narrative:      Exam: XR FEMUR 2 VW RIGHT-     History: Injury     Comparison: No images available     Findings:     No acute fracture or dislocation.     Soft tissue are normal.       Impression:      Impression:     No acute bony process of the femur.     This report was finalized on 5/2/2025 5:49 PM by Paco Soliman MD.       XR Tibia Fibula 2 View Right [[id number]] Collected: 05/02/25 1749     Updated: 05/02/25 1806    Narrative:      Exam: XR TIBIA FIBULA 2 VW RIGHT-     History: injury     Comparison: No images available     Findings:     Comminuted depressed lateral tibial plateau fracture. Question fibula  shaft fracture.      No other acute fracture or dislocation.     Soft tissue are normal.       Impression:      Impression:     Tibial plateau fracture with possible fibula shaft fracture.      This report was finalized on 5/2/2025 5:50 PM by Paco Soliman MD.       XR Ankle 3+ View Right [[id number]] Collected: 05/02/25 1750     Updated: 05/02/25 1805    Narrative:      Exam: XR ANKLE 3+ VW RIGHT-     History: injury     Comparison: No images available     Findings:     No acute fracture or dislocation.     Soft tissue are normal.       Impression:      Impression:     No acute bony process.     This report was finalized on 5/2/2025 5:50 PM by Paco Soliman MD.       XR Shoulder 2+ View Right [[id number]] Collected: 05/02/25 1746     Updated: 05/02/25 1749    Narrative:      Exam: XR SHOULDER 2+ VW RIGHT-     History: Injury     Comparison: No images available     Findings:     Severe subacromial space narrowing with chronic humeral neck deformity.      No  acute fracture or dislocation.     Soft tissue are normal.       Impression:      Impression:     No acute bony process.     This report was finalized on 2025 5:47 PM by Paco Soliman MD.       XR Chest 1 View [[id number]] Collected: 25     Updated: 25    Narrative:      Procedure: Frontal view of chest obtained.     Comparison: 2019     History: weakness     Findings:     Bilateral lung reticulation.   Normal heart size and mediastinal contours.  Trachea is in midline position.  No edema or effusion is seen.  There is no evidence of pneumothorax.       Impression:         Bilateral lung reticulation which is new. Atypical infection versus  fibrosis.      This report was finalized on 2025 5:46 PM by Paco Soliman MD.             ----------------------------------------------------------------------------------------------------------------------      Assessment: Right lateral tibial plateau fracture       Plan:     Imaging was reviewed with Dr. Schwartz.  CT scan has been completed.    Patient is recommended for outpatient follow-up for scheduling of surgery, however, she states that she lives alone and will not be able to manage by herself.    Continue the knee immobilizer.  Remain nonweightbearing to the right lower extremity at all times.    Pain control as needed.    Orthopedic surgery will continue to follow.    Thank you for the consult.    Theresa Iverson, APRN  25  12:48 EDT    Electronically signed by Bon Schwartz DO at 25 0741          Physical Therapy Notes (most recent note)        Erika Villalpando, PT at 25 7732  Version 1 of 1         Acute Care - Physical Therapy Initial Evaluation  VERONICA Denson     Patient Name: Johanna Street  : 1945  MRN: [id number]  Today's Date: 2025   Onset of Illness/Injury or Date of Surgery: 25  Visit Dx:     ICD-10-CM ICD-9-CM   1. Closed fracture of right tibial plateau, initial encounter   S82.141A 823.00     Patient Active Problem List   Diagnosis    PAF (paroxysmal atrial fibrillation) 12 2013. [id number] score is 2.    Ankle edema    Depression    Anxiety    Paraesophageal hernia    Gastroesophageal reflux disease    Dysphagia    Abnormal EKG, left anterior fascicular block    Chronic anticoagulation    Tibial plateau fracture, right     Past Medical History:   Diagnosis Date    Ankle edema     Anxiety     Arthritis     Atrial fibrillation     Back pain     Depression     Hernia, hiatal     PAF (paroxysmal atrial fibrillation)      Past Surgical History:   Procedure Laterality Date    APPENDECTOMY      CARDIAC CATHETERIZATION  12/2013    CHOLECYSTECTOMY      COLON SURGERY      COLONOSCOPY  2006    ENDOSCOPY N/A 12/7/2017    Procedure: ESOPHAGOGASTRODUODENOSCOPY WITH ANESTHESIA;  Surgeon: Eliseo Moon MD;  Location: [facility];  Service:     ENDOSCOPY N/A 2/21/2018    Procedure: ESOPHAGOGASTRODUODENOSCOPY WITH DILATATION CPT CODE: 20759;  Surgeon: Tomi Dey III, MD;  Location: [facility];  Service:     EYE SURGERY      HYSTERECTOMY      NERVE REPAIR      TIBIAL PLATEAU OPEN REDUCTION INTERNAL FIXATION Right 5/6/2025    Procedure: TIBIAL PLATEAU OPEN REDUCTION INTERNAL FIXATION;  Surgeon: Bon Harrell DO;  Location: [facility];  Service: Orthopedics;  Laterality: Right;    TONSILLECTOMY       PT Assessment (Last 12 Hours)       PT Evaluation and Treatment       Row Name 05/07/25 1418          Physical Therapy Time and Intention    Subjective Information complains of;pain;weakness  -AG     Document Type evaluation  -AG     Mode of Treatment physical therapy  -AG     Patient Effort adequate  -AG     Symptoms Noted During/After Treatment dizziness  -AG       Row Name 05/07/25 1418          General Information    Patient Profile Reviewed yes  -AG     Onset of Illness/Injury or Date of Surgery 05/02/25  -AG     Referring Physician Dr. Harrell  -AG     Patient Observations  alert;cooperative;agree to therapy  -AG     Patient/Family/Caregiver Comments/Observations pt. supine in bed on room air; R knee immobilizer and cryocuff in place.  Granddaughter at bedside.  Pt. requires encouragement for participation.  -AG     Prior Level of Function --  pt. is poor historian with regards to PLOF; decr insight into current deficits.  -AG     Equipment Currently Used at Home walker, rolling  -AG     Pertinent History of Current Functional Problem pt. sustained fall resulting in R tibial plateau fx; underwent ORIF with orders for strict NWB.  -AG     Existing Precautions/Restrictions fall;non-weight bearing  -AG     Limitations/Impairments safety/cognitive  -AG     Equipment Issued to Patient gait belt  -AG     Risks Reviewed patient and family:;increased discomfort;dizziness  -AG     Benefits Reviewed patient and family:;improve function;increase independence;increase strength;increase balance;increase knowledge;decrease risk of DVT  -AG     Barriers to Rehab previous functional deficit;cognitive status  -AG       Row Name 05/07/25 1418          Previous Level of Function/Home Environm    Household Ambulation, Previous Functional Level uses device or equipment  -AG     Community Ambulation, Previous Functional Level uses device or equipment  -AG       Row Name 05/07/25 1418          Living Environment    Current Living Arrangements home  -AG     Home Accessibility --  has one small threshold to enter ground floor apartment  -AG     People in Home alone  -AG     Primary Care Provided by self  -AG       Row Name 05/07/25 1418          Home Use of Assistive/Adaptive Equipment    Equipment Currently Used at Home walker, rolling  -AG       Row Name 05/07/25 1418          Pain    Pain Location extremity  -AG     Pain Side/Orientation right;lower  -AG       Row Name 05/07/25 1418          Cognition    Affect/Mental Status (Cognition) --  irritable  -AG     Orientation Status (Cognition) oriented  to;person;place  decr insight into situation; also decr orientation to time, believes she just came out of surgery today.  -AG     Follows Commands (Cognition) verbal cues/prompting required;repetition of directions required  -     Personal Safety Interventions fall prevention program maintained;gait belt;nonskid shoes/slippers when out of bed;supervised activity  -       Row Name 05/07/25 1418          Range of Motion (ROM)    Range of Motion --  L LE AAROM WFL; limited B shoulder ROM; R ankle AROM WFL  -       Row Name 05/07/25 1418          Strength (Manual Muscle Testing)    Strength (Manual Muscle Testing) --  R ankle DF/ PF WFL  -       Row Name 05/07/25 1418          Mobility    Extremity Weight-bearing Status right lower extremity  -AG     Right Lower Extremity (Weight-bearing Status) non weight-bearing (NWB)  -       Row Name 05/07/25 1418          Bed Mobility    Bed Mobility rolling left;rolling right;scooting/bridging;supine-sit;sit-supine  -AG     Rolling Left [place] (Bed Mobility) verbal cues;nonverbal cues (demo/gesture);maximum assist (25% patient effort)  -AG     Rolling Right [place] (Bed Mobility) verbal cues;nonverbal cues (demo/gesture);maximum assist (25% patient effort)  -AG     Scooting/Bridging [place] (Bed Mobility) minimum assist (75% patient effort)  -AG     Supine-Sit [place] (Bed Mobility) verbal cues;nonverbal cues (demo/gesture);maximum assist (25% patient effort)  -AG     Sit-Supine [place] (Bed Mobility) verbal cues;nonverbal cues (demo/gesture);maximum assist (25% patient effort);2 person assist  -AG     Bed Mobility, Safety Issues decreased use of legs for bridging/pushing;decreased use of arms for pushing/pulling;cognitive deficits limit understanding  -     Assistive Device (Bed Mobility) bed rails;repositioning sheet  -       Row Name 05/07/25 1418          Transfers    Transfers sit-stand transfer;stand-sit transfer  -AG     Maintains  Weight-bearing Status (Transfers) able to maintain  -AG       Row Name 05/07/25 1418          Sit-Stand Transfer    Sit-Stand [place] (Transfers) verbal cues;nonverbal cues (demo/gesture);moderate assist (50% patient effort);2 person assist;maximum assist (25% patient effort)  -     Assistive Device (Sit-Stand Transfers) walker, front-wheeled  -AG       Row Name 05/07/25 1418          Stand-Sit Transfer    Stand-Sit [place] (Transfers) verbal cues;nonverbal cues (demo/gesture);moderate assist (50% patient effort);2 person assist  -AG     Assistive Device (Stand-Sit Transfers) walker, front-wheeled  -AG       Row Name 05/07/25 1418          Gait/Stairs (Locomotion)    Patient was able to Ambulate no, other medical factors prevent ambulation  -AG     Reason Patient was unable to Ambulate Excessive Weakness  -AG       Row Name 05/07/25 1418          Safety Issues/Impairments Affecting Functional Mobility    Safety Issues Affecting Function (Mobility) insight into deficits/self-awareness;safety precautions follow-through/compliance;safety precaution awareness  -       Row Name 05/07/25 1418          Balance    Balance Assessment sitting static balance;sitting dynamic balance;sit to stand dynamic balance;standing static balance;standing dynamic balance  -     Static Sitting Balance standby assist  -AG     Position, Sitting Balance unsupported;sitting edge of bed  -     Static Standing Balance verbal cues;non-verbal cues (demo/gesture);2-person assist;moderate assist;maximum assist  -AG       Row Name 05/07/25 1418          Motor Skills    Therapeutic Exercise hip;knee;ankle  -       Row Name 05/07/25 1418          Ankle (Therapeutic Exercise)    Ankle (Therapeutic Exercise) AROM (active range of motion)  -     Ankle AROM (Therapeutic Exercise) right;dorsiflexion;plantarflexion;supine  -       Row Name             Wound 05/06/25 1609 Right anterior knee Surgical Open Surgical Incision    Wound -  Properties Group Placement Date: 05/06/25  -CE Placement Time: 1609  -CE Side: Right  -CE Orientation: anterior  -CE Location: knee  -CE Primary Wound Type: Surgical  -CE Secondary Wound Type - Surgical: Open Surg  -CE    Retired Wound - Properties Group Placement Date: 05/06/25  -CE Placement Time: 1609  -CE Side: Right  -CE Orientation: anterior  -CE Location: knee  -CE    Retired Wound - Properties Group Placement Date: 05/06/25  -CE Placement Time: 1609  -CE Side: Right  -CE Orientation: anterior  -CE Location: knee  -CE    Retired Wound - Properties Group Date first assessed: 05/06/25  -CE Time first assessed: 1609  -CE Side: Right  -CE Location: knee  -CE      Row Name 05/07/25 1418          Coping    Observed Emotional State irritable  -AG     Verbalized Emotional State acceptance  -AG     Trust Relationship/Rapport care explained;choices provided;thoughts/feelings acknowledged  -AG     Family/Support Persons --  granddaughter  -AG     Involvement in Care at bedside  -AG     Family/Support System Care support provided;self-care encouraged  -AG       Row Name 05/07/25 1418          Plan of Care Review    Plan of Care Reviewed With patient  -AG     Outcome Evaluation PT evaluation complete.  Pt. requires mod/ max A x 2 for functional mobility skills; able to stand w/ RW and maintain R LE NWB x 2 trials.  Pt. able to clear hips from bed but not achieve upright stance.  Discussed importance of maintaining NWB R LE, importance of mobilization.  Pt. will require ongoing skilled PT to achieve optimal safe function.  Will continue to follow.  -AG       Row Name 05/07/25 1418          Positioning and Restraints    Pre-Treatment Position in bed  -AG     Post Treatment Position bed  -AG     In Bed supine;call light within reach;encouraged to call for assist;exit alarm on;with family/caregiver;with nsg;side rails up x3;RLE elevated  -AG       Row Name 05/07/25 1418          Therapy Assessment/Plan (PT)    Patient/Family  Therapy Goals Statement (PT) return home to OF  -AG     Functional Level at Time of Evaluation (PT) mod/ max A x 2  -AG     PT Diagnosis (PT) impaired functional mobility  -AG     Rehab Potential (PT) good  -AG     Criteria for Skilled Interventions Met (PT) yes;meets criteria;skilled treatment is necessary  -AG     Therapy Frequency (PT) 6 times/wk  Monday-Saturday  -AG     Predicted Duration of Therapy Intervention (PT) LOS  -AG     Problem List (PT) problems related to;balance;cognition;mobility;strength;pain;range of motion (ROM)  -AG     Activity Limitations Related to Problem List (PT) unable to ambulate safely;unable to transfer safely;BADLs not performed adequately or safely  -[facility] Name 05/07/25 1418          Therapy Plan Review/Discharge Plan (PT)    Therapy Plan Review (PT) evaluation/treatment results reviewed;care plan/treatment goals reviewed;risks/benefits reviewed;current/potential barriers reviewed;participants voiced agreement with care plan;participants included;patient  -AG       Row Name 05/07/25 1418          Physical Therapy Goals    Bed Mobility Goal Selection (PT) bed mobility, PT goal 1  -AG     Transfer Goal Selection (PT) transfer, PT goal 1;transfer, PT goal 2  -AG       Row Name 05/07/25 1418          Bed Mobility Goal 1 (PT)    Activity/Assistive Device (Bed Mobility Goal 1, PT) rolling to left;rolling to right;sit to supine;scooting;supine to sit  -AG     [place] Level/Cues Needed (Bed Mobility Goal 1, PT) minimum assist (75% or more patient effort)  -AG     Time Frame (Bed Mobility Goal 1, PT) by discharge  -AG       Row Name 05/07/25 1418          Transfer Goal 1 (PT)    Activity/Assistive Device (Transfer Goal 1, PT) sit-to-stand/stand-to-sit;walker, rolling  -AG     [place] Level/Cues Needed (Transfer Goal 1, PT) moderate assist (50-74% patient effort)  -AG     Time Frame (Transfer Goal 1, PT) by discharge  -AG       Row Name 05/07/25 1418          Transfer Goal  2 (PT)    Activity/Assistive Device (Transfer Goal 2, PT) bed-to-chair/chair-to-bed;toilet;walker, rolling  -AG     [place] Level/Cues Needed (Transfer Goal 2, PT) moderate assist (50-74% patient effort)  -AG     Time Frame (Transfer Goal 2, PT) by discharge  -               User Key  (r) = Recorded By, (t) = Taken By, (c) = Cosigned By      Initials Name Provider Type     Erika Villalpando, PT Physical Therapist    Lee Petit, RN Registered Nurse                    Physical Therapy Education       Title: PT OT SLP Therapies (Done)       Topic: Physical Therapy (Done)       Point: Mobility training (Done)       Learning Progress Summary            Patient Acceptance, E,D, VU,NR by  at 5/7/2025 1417   Family Acceptance, E,D, VU,NR by  at 5/7/2025 1417                      Point: Home exercise program (Done)       Learning Progress Summary            Patient Acceptance, E,D, VU,NR by  at 5/7/2025 1417   Family Acceptance, E,D, VU,NR by  at 5/7/2025 1417                      Point: Body mechanics (Done)       Learning Progress Summary            Patient Acceptance, E,D, VU,NR by AG at 5/7/2025 1417   Family Acceptance, E,D, VU,NR by  at 5/7/2025 1417                      Point: Precautions (Done)       Learning Progress Summary            Patient Acceptance, E,D, VU,NR by  at 5/7/2025 1417   Family Acceptance, E,D, VU,NR by AG at 5/7/2025 1417                                      User Key       Initials Effective Dates Name Provider Type [facility] 06/16/21 -  Erika Villalpando, PT Physical Therapist PT                  PT Recommendation and Plan  Anticipated Discharge Disposition (PT): inpatient rehabilitation facility  Planned Therapy Interventions (PT): balance training, bed mobility training, gait training, home exercise program, transfer training, strengthening, ROM (range of motion), postural re-education, patient/family education  Therapy Frequency (PT): 6 times/wk  (Monday-Saturday)  Plan of Care Reviewed With: patient  Outcome Evaluation: PT evaluation complete.  Pt. requires mod/ max A x 2 for functional mobility skills; able to stand w/ RW and maintain R LE NWB x 2 trials.  Pt. able to clear hips from bed but not achieve upright stance.  Discussed importance of maintaining NWB R LE, importance of mobilization.  Pt. will require ongoing skilled PT to achieve optimal safe function.  Will continue to follow.       Time Calculation:    PT Charges       Row Name 05/07/25 1415             Time Calculation    PT Received On 05/07/25  -      PT - Next Appointment 05/08/25  -      PT Goal Re-Cert Due Date 05/21/25  -                User Key  (r) = Recorded By, (t) = Taken By, (c) = Cosigned By      Initials Name Provider Type     Erika Villalpando, PT Physical Therapist                  Therapy Charges for Today       Code Description Service Date Service Provider Modifiers Qty    95907610865 HC PT EVAL HIGH COMPLEXITY 4 5/7/2025 Erika Villalpando, PT GP 1            PT G-Codes  AM-PAC 6 Clicks Score (PT): 11    Erika Villalpando PT  5/7/2025      Electronically signed by Erika Villalpando, PT at 05/07/25 1440          Occupational Therapy Notes (most recent note)        Lorena Vasquez, OT at 05/06/25 1517          Patient undergoing orthopedic intervention, will see patient on 5/7.    Electronically signed by Lorena Vasquez, OT at 05/06/25 0772

## 2025-05-07 NOTE — CASE MANAGEMENT/SOCIAL WORK
Discharge Planning Assessment   Jarett     Patient Name: Johanna Street  MRN: [id number]  Today's Date: 5/7/2025    Admit Date: 5/2/2025            Discharge Plan       Row Name 05/07/25 1605       Plan    Plan SS spoke with Pt and grand-daughter at bedside on this date. Pt is agreeable to SNF placement for short term rehab, prefers [facility]. SS contacted [facility] per Oanh and placed referral in EPCI basket for review. SS notified Provider.  pre-auth team to submit pre-auth on 05/08/25 with updated OT eval. SS to follow.                 Continued Care and Services - Admitted Since 5/2/2025       Destination       Service Provider Request Status Services Address Phone Fax Patient Preferred    [facility] Pending - [place] doc assigned and pre-auth -- [address] [place] [address] [phone] --                    IHSAN Mckeon

## 2025-05-07 NOTE — PROGRESS NOTES
Inpatient Progress Note  Johanna Street  Date: 05/07/25  MRN: [id number]      Subjective:  Resting in bed.  Notes pain to the right leg, nurse made aware.  No chest pain or shortness of air.  (+) flatulence.  VSS.  No acute events overnight.      Objective:      Vitals:    05/07/25 0015 05/07/25 0200 05/07/25 0655 05/07/25 1038   BP: 115/68 142/77 112/66 90/44   BP Location: Right arm Right arm Right arm Right arm   Patient Position: Lying Lying Lying Lying   Pulse:   110    Resp: 18 18 18 18   Temp: 97.5 °F (36.4 °C) 97.8 °F (36.6 °C) 98.3 °F (36.8 °C) 98.6 °F (37 °C)   TempSrc: Oral Oral Oral Oral   SpO2:   95% 96%   Weight:       Height:              Physical Exam:  Constitutional:  Well-developed, well-nourished.  No acute distress.        Musculoskeletal:  Upon examination of the right lower extremity, the surgical dressing and knee immobilizer are in place.  The dressing is clean and dry.  No edema, erythema, or ecchymosis noted to the tissues above or below the dressing.  Able to flex and extend the ankle and digits.  Capillary refill is brisk.  Light touch sensation is intact, distally.  (+) DP pulse.     Labs:    Results from last 7 days   Lab Units 05/07/25  0118 05/05/25  0015 05/03/25  0107   WBC 10*3/mm3 12.19* 9.71 13.41*   HEMOGLOBIN g/dL 11.3* 10.3* 12.1   HEMATOCRIT % 36.2 32.8* 38.0   MCV fL 96.5 97.3* 94.8   MCHC g/dL 31.2* 31.4* 31.8   PLATELETS 10*3/mm3 309 202 225         Results from last 7 days   Lab Units 05/07/25  0118 05/05/25  1038 05/05/25  0015 05/03/25  0107 05/02/25  1633   SODIUM mmol/L 134*  --  138 131* 135*   POTASSIUM mmol/L 5.2 4.3 3.6 5.0 4.3   MAGNESIUM mg/dL  --   --   --   --  2.0   CHLORIDE mmol/L 96*  --  106 96* 97*   CO2 mmol/L 25.6  --  20.6* 21.0* 21.7*   BUN mg/dL 9  --  8 11 11   CREATININE mg/dL 0.79  --  0.68 0.89 0.96   CALCIUM mg/dL 8.9  --  8.1* 9.1 9.7   GLUCOSE mg/dL 127*  --  144* 138* 160*   ALBUMIN g/dL 3.0*  --   --  3.5 4.2   BILIRUBIN mg/dL 1.0  --   --   "1.4* 1.1   ALK PHOS U/L 135*  --   --  109 120*   AST (SGOT) U/L 45*  --   --  33* 20   ALT (SGPT) U/L 23  --   --  16 14   Estimated Creatinine Clearance: 56.7 mL/min (by C-G formula based on SCr of 0.79 mg/dL).  No results found for: \"AMMONIA\"          No results found for: \"HGBA1C\"  Lab Results   Component Value Date    TSH 4.760 (H) 05/02/2025         Pain Management Panel          Latest Ref Rng & Units 5/3/2025   Pain Management Panel   Amphetamine, Urine Qual Negative Negative    Barbiturates Screen, Urine Negative Negative    Benzodiazepine Screen, Urine Negative Positive    Buprenorphine, Screen, Urine Negative Negative    Cocaine Screen, Urine Negative Negative    Fentanyl, Urine Negative Negative    Methadone Screen , Urine Negative Negative    Methamphetamine, Ur Negative Negative        No results found for: \"BLOODCX\"  No results found for: \"URINECX\"  No results found for: \"WOUNDCX\"  No results found for: \"STOOLCX\"              Radiology:  Imaging Results (Last 72 Hours)       Procedure Component Value Units Date/Time    FL Surgery Fluoro [[id number]] Collected: 05/07/25 0759     Updated: 05/07/25 0802    Narrative:      EXAMINATION: FL SURGERY FLUORO-      CLINICAL INDICATION: tibfid; S82.141A-Displaced bicondylar fracture of  right tibia, initial encounter for closed fracture        COMPARISON: None available     Total fluoroscopy time 210.3 seconds     Fluoroscopy was provided and 8 images were acquired during surgery     For a full procedural report, please see the report provided by the  performing physician        This report was finalized on 5/7/2025 8:00 AM by Dr. Lane Boothe MD.                 Assessment:      Status post right lateral tibial plateau ORIF with open lateral meniscus repair, POD #1.       Plan:      Strict non weight bearing to the right lower extremity.    Maintain the surgical dressing and knee immobilizer.    PT/OT for mobility.    On Lovenox for DVT prophylaxis.    Pain " control.    Orthopedic surgery following.    Neo Correa, DAVID   May 7, 2025   11:12 EDT

## 2025-05-07 NOTE — THERAPY EVALUATION
Patient Name: Johanna Street  : 1945    MRN: [id number]                              Today's Date: 2025       Admit Date: 2025    Visit Dx:     ICD-10-CM ICD-9-CM   1. Closed fracture of right tibial plateau, initial encounter  S82.141A 823.00     Patient Active Problem List   Diagnosis    PAF (paroxysmal atrial fibrillation) 2013. [id number] score is 2.    Ankle edema    Depression    Anxiety    Paraesophageal hernia    Gastroesophageal reflux disease    Dysphagia    Abnormal EKG, left anterior fascicular block    Chronic anticoagulation    Tibial plateau fracture, right     Past Medical History:   Diagnosis Date    Ankle edema     Anxiety     Arthritis     Atrial fibrillation     Back pain     Depression     Hernia, hiatal     PAF (paroxysmal atrial fibrillation)      Past Surgical History:   Procedure Laterality Date    APPENDECTOMY      CARDIAC CATHETERIZATION  2013    CHOLECYSTECTOMY      COLON SURGERY      COLONOSCOPY      ENDOSCOPY N/A 2017    Procedure: ESOPHAGOGASTRODUODENOSCOPY WITH ANESTHESIA;  Surgeon: Eliseo Moon MD;  Location: [facility];  Service:     ENDOSCOPY N/A 2018    Procedure: ESOPHAGOGASTRODUODENOSCOPY WITH DILATATION CPT CODE: 47825;  Surgeon: Tomi Dey III, MD;  Location: [facility];  Service:     EYE SURGERY      HYSTERECTOMY      NERVE REPAIR      TIBIAL PLATEAU OPEN REDUCTION INTERNAL FIXATION Right 2025    Procedure: TIBIAL PLATEAU OPEN REDUCTION INTERNAL FIXATION;  Surgeon: Bon Harrell DO;  Location: [facility];  Service: Orthopedics;  Laterality: Right;    TONSILLECTOMY        General Information       Row Name 25 1513          OT Time and Intention    Subjective Information complains of;pain;weakness  -KP     Document Type evaluation  -KP     Mode of Treatment individual therapy;occupational therapy  -KP     Patient Effort adequate  -     Symptoms Noted During/After Treatment dizziness  -KP     Comment Patient  agreeable to OT evaluation. Granddaughter present throughout session.  -       Row Name 05/07/25 1513          General Information    Patient Profile Reviewed yes  -     Prior Level of Function --  patient reports she was able to complete simple daily tasks within home but stayed in living room/kitchen/dining room area; sleeps on azeem lounge; family checks on her weekly to assist with buying groceries  -     Existing Precautions/Restrictions fall;non-weight bearing  -     Barriers to Rehab previous functional deficit;cognitive status  -       Row Name 05/07/25 1513          Occupational Profile    Reason for Services/Referral (Occupational Profile) Patient admitted to [facility] on 5/2/2025 status post fall. She underwent ORIF for right tibial plateau fracture on 5/6/2025. Patient referred for OT evaluation due to change in functional performance with ADLs, functional mobility, and/or transfers.  -       Row Name 05/07/25 1513          Living Environment    Current Living Arrangements home  -     People in Home alone  -       Row Name 05/07/25 1513          Home Main Entrance    Number of Stairs, Main Entrance one  -       Row Name 05/07/25 1513          Cognition    Orientation Status (Cognition) oriented to;person;place  -       Row Name 05/07/25 1513          Safety Issues/Impairments Affecting Functional Mobility    Safety Issues Affecting Function (Mobility) awareness of need for assistance;insight into deficits/self-awareness;judgment  -     Impairments Affecting Function (Mobility) balance;endurance/activity tolerance;cognition;pain;range of motion (ROM);strength  -     Cognitive Impairments, Mobility Safety/Performance insight into deficits/self-awareness;awareness, need for assistance;judgment  -               User Key  (r) = Recorded By, (t) = Taken By, (c) = Cosigned By      Initials Name Provider Type    Lorena Rockwell, OT Occupational Therapist                      Mobility/ADL's       Row Name 05/07/25 1519          Bed Mobility    Bed Mobility rolling left;rolling right;scooting/bridging;supine-sit;sit-supine  -     Rolling Left [place] (Bed Mobility) verbal cues;nonverbal cues (demo/gesture);maximum assist (25% patient effort)  -     Rolling Right [place] (Bed Mobility) verbal cues;nonverbal cues (demo/gesture);maximum assist (25% patient effort)  -     Scooting/Bridging [place] (Bed Mobility) minimum assist (75% patient effort)  -     Supine-Sit [place] (Bed Mobility) verbal cues;nonverbal cues (demo/gesture);maximum assist (25% patient effort)  -     Sit-Supine [place] (Bed Mobility) verbal cues;nonverbal cues (demo/gesture);maximum assist (25% patient effort);2 person assist  -     Bed Mobility, Safety Issues decreased use of legs for bridging/pushing;decreased use of arms for pushing/pulling;cognitive deficits limit understanding  -     Assistive Device (Bed Mobility) bed rails;repositioning sheet  -       Row Name 05/07/25 1519          Transfers    Transfers sit-stand transfer;stand-sit transfer  -       Row Name 05/07/25 1519          Sit-Stand Transfer    Sit-Stand [place] (Transfers) verbal cues;nonverbal cues (demo/gesture);moderate assist (50% patient effort);2 person assist;maximum assist (25% patient effort)  -     Assistive Device (Sit-Stand Transfers) walker, front-wheeled  -       Row Name 05/07/25 1519          Stand-Sit Transfer    Stand-Sit [place] (Transfers) verbal cues;nonverbal cues (demo/gesture);moderate assist (50% patient effort);2 person assist  -     Assistive Device (Stand-Sit Transfers) walker, front-wheeled  [facility]       Row Name 05/07/25 1519          Activities of Daily Living    BADL Assessment/Intervention bathing;upper body dressing;lower body dressing;grooming;toileting  -       Row Name 05/07/25 1519          Mobility    Extremity Weight-bearing Status right lower  extremity  -     Right Lower Extremity (Weight-bearing Status) non weight-bearing (NWB)  right knee immobilizer donned throughout  -KP       Row Name 05/07/25 1519          Bathing Assessment/Intervention    [place] Level (Bathing) bathing skills;maximum assist (25% patient effort)  -KP       Row Name 05/07/25 1519          Upper Body Dressing Assessment/Training    [place] Level (Upper Body Dressing) upper body dressing skills;maximum assist (25% patient effort)  -KP       Row Name 05/07/25 1519          Lower Body Dressing Assessment/Training    [place] Level (Lower Body Dressing) lower body dressing skills;dependent (less than 25% patient effort)  -KP       Row Name 05/07/25 1519          Grooming Assessment/Training    [place] Level (Grooming) grooming skills;maximum assist (25% patient effort)  -KP       Row Name 05/07/25 1519          Toileting Assessment/Training    [place] Level (Toileting) toileting skills;dependent (less than 25% patient effort)  -               User Key  (r) = Recorded By, (t) = Taken By, (c) = Cosigned By      Initials Name Provider Type     Lorena Vasquez OT Occupational Therapist                   Obj/Interventions       Row Name 05/07/25 1520          Sensory Assessment (Somatosensory)    Sensory Assessment (Somatosensory) UE sensation intact  -KP       Row Name 05/07/25 1520          Vision Assessment/Intervention    Visual Impairment/Limitations WFL;corrective lenses full-time  -KP       Row Name 05/07/25 1520          Range of Motion Comprehensive    Comment, General Range of Motion severely limited AROM proximally in shoulders of BUEs (<25% movement); patient able to bring bilateral hands to face  -KP       Row Name 05/07/25 1520          Strength Comprehensive (MMT)    Comment, General Manual Muscle Testing (MMT) Assessment 2+/5 MMT in BUEs  -KP       Row Name 05/07/25 1520          Motor Skills    Motor Skills coordination;functional endurance   -KP     Coordination fine motor deficit;gross motor deficit;bilateral;upper extremity;minimal impairment  -     Functional Endurance poor plus  -       Row Name 05/07/25 1520          Balance    Balance Assessment sitting static balance;standing static balance  -     Static Sitting Balance contact guard  -     Static Standing Balance maximum assist;2-person assist;non-verbal cues (demo/gesture);verbal cues  -     Position/Device Used, Standing Balance walker, rolling  -               User Key  (r) = Recorded By, (t) = Taken By, (c) = Cosigned By      Initials Name Provider Type    Lorena Rockwell OT Occupational Therapist                   Goals/Plan       Row Name 05/07/25 1524          Transfer Goal 1 (OT)    Activity/Assistive Device (Transfer Goal 1, OT) toilet;commode, 3-in-1  -     [place] Level/Cues Needed (Transfer Goal 1, OT) moderate assist (50-74% patient effort)  -     Time Frame (Transfer Goal 1, OT) by discharge  -       Row Name 05/07/25 1524          Problem Specific Goal 1 (OT)    Problem Specific Goal 1 (OT) Patient will perform sustained activity X12 minutes to promote functional endurance/activity tolerance needed for daily routine.  -     Time Frame (Problem Specific Goal 1, OT) by discharge  -       Row Name 05/07/25 1524          Therapy Assessment/Plan (OT)    Planned Therapy Interventions (OT) activity tolerance training;functional balance retraining;patient/caregiver education/training;BADL retraining;ROM/therapeutic exercise;strengthening exercise;occupation/activity based interventions;transfer/mobility retraining;passive ROM/stretching  -               User Key  (r) = Recorded By, (t) = Taken By, (c) = Cosigned By      Initials Name Provider Type    Lorena Rockwell OT Occupational Therapist                   Clinical Impression       Row Name 05/07/25 1521          Pain Assessment    Pretreatment Pain Rating 1/10  -     Posttreatment Pain Rating 2/10   -     Pain Location extremity  -     Pain Side/Orientation right;lower  -       Row Name 05/07/25 1521          Plan of Care Review    Plan of Care Reviewed With patient;grandchild(jono)  -     Progress no change  -     Outcome Evaluation Patient seen for OT evaluationl. She presents with functional limitations including generalized weakness, pain, impaired balance, limited ROM, and decreased activity tolerance/functional endurance. Patient with decreased safety evidenced by questionable insight into limitations, judgement, and need for assist. Patient would benefit from ongoing OT services to promote highest level of independence and safety prior to discharge. Patient would benefit rom inpatient rehab stay to return to prior level for safest return home.  -       Row Name 05/07/25 1521          Therapy Assessment/Plan (OT)    Patient/Family Therapy Goal Statement (OT) return home  -     Rehab Potential (OT) good  -     Criteria for Skilled Therapeutic Interventions Met (OT) yes;meets criteria;skilled treatment is necessary  -     Therapy Frequency (OT) 5 times/wk  -     Predicted Duration of Therapy Intervention (OT) discharge  -       Row Name 05/07/25 1521          Therapy Plan Review/Discharge Plan (OT)    Anticipated Discharge Disposition (OT) inpatient rehabilitation facility  -       Row Name 05/07/25 1521          Positioning and Restraints    Pre-Treatment Position in bed  -     Post Treatment Position bed  -     In Bed fowlers;call light within reach;encouraged to call for assist;exit alarm on;RLE elevated;R heel elevated  -               User Key  (r) = Recorded By, (t) = Taken By, (c) = Cosigned By      Initials Name Provider Type     Lorena Vasquez, OT Occupational Therapist                   Outcome Measures       Row Name 05/07/25 0805          How much help from another person do you currently need...    Turning from your back to your side while in flat bed without  using bedrails? 3  -VM     Moving from lying on back to sitting on the side of a flat bed without bedrails? 2  -VM     Moving to and from a bed to a chair (including a wheelchair)? 2  -VM     Standing up from a chair using your arms (e.g., wheelchair, bedside chair)? 2  -VM     Climbing 3-5 steps with a railing? 1  -VM     To walk in hospital room? 1  -VM     AM-PAC 6 Clicks Score (PT) 11  -               User Key  (r) = Recorded By, (t) = Taken By, (c) = Cosigned By      Initials Name Provider Type    Jerri Nelson, RN Registered Nurse                      OT Recommendation and Plan  Planned Therapy Interventions (OT): activity tolerance training, functional balance retraining, patient/caregiver education/training, BADL retraining, ROM/therapeutic exercise, strengthening exercise, occupation/activity based interventions, transfer/mobility retraining, passive ROM/stretching  Therapy Frequency (OT): 5 times/wk  Plan of Care Review  Plan of Care Reviewed With: patient, grandchild(jono)  Progress: no change  Outcome Evaluation: Patient seen for OT evaluationl. She presents with functional limitations including generalized weakness, pain, impaired balance, limited ROM, and decreased activity tolerance/functional endurance. Patient with decreased safety evidenced by questionable insight into limitations, judgement, and need for assist. Patient would benefit from ongoing OT services to promote highest level of independence and safety prior to discharge. Patient would benefit rom inpatient rehab stay to return to prior level for safest return home.     Time Calculation:         Time Calculation- OT       Row Name 05/07/25 1525             Time Calculation- OT    OT Received On 05/07/25  -                User Key  (r) = Recorded By, (t) = Taken By, (c) = Cosigned By      Initials Name Provider Type    Lorena Rockwell OT Occupational Therapist                  Therapy Charges for Today       Code Description  Service Date Service Provider Modifiers Qty    [id number] HC OT EVAL MOD COMPLEXITY 4 5/7/2025 Lorena Vasquez, MARTINA GO 1                 Lorena Vasquez OT  5/7/2025

## 2025-05-07 NOTE — THERAPY EVALUATION
Acute Care - Physical Therapy Initial Evaluation   Jarett     Patient Name: Johanna Street  : 1945  MRN: [id number]  Today's Date: 2025   Onset of Illness/Injury or Date of Surgery: 25  Visit Dx:     ICD-10-CM ICD-9-CM   1. Closed fracture of right tibial plateau, initial encounter  S82.141A 823.00     Patient Active Problem List   Diagnosis    PAF (paroxysmal atrial fibrillation) 2013. [id number] score is 2.    Ankle edema    Depression    Anxiety    Paraesophageal hernia    Gastroesophageal reflux disease    Dysphagia    Abnormal EKG, left anterior fascicular block    Chronic anticoagulation    Tibial plateau fracture, right     Past Medical History:   Diagnosis Date    Ankle edema     Anxiety     Arthritis     Atrial fibrillation     Back pain     Depression     Hernia, hiatal     PAF (paroxysmal atrial fibrillation)      Past Surgical History:   Procedure Laterality Date    APPENDECTOMY      CARDIAC CATHETERIZATION  2013    CHOLECYSTECTOMY      COLON SURGERY      COLONOSCOPY      ENDOSCOPY N/A 2017    Procedure: ESOPHAGOGASTRODUODENOSCOPY WITH ANESTHESIA;  Surgeon: Eliseo Moon MD;  Location: [facility];  Service:     ENDOSCOPY N/A 2018    Procedure: ESOPHAGOGASTRODUODENOSCOPY WITH DILATATION CPT CODE: 16652;  Surgeon: Tomi Dey III, MD;  Location: [facility];  Service:     EYE SURGERY      HYSTERECTOMY      NERVE REPAIR      TIBIAL PLATEAU OPEN REDUCTION INTERNAL FIXATION Right 2025    Procedure: TIBIAL PLATEAU OPEN REDUCTION INTERNAL FIXATION;  Surgeon: Bon Harrell DO;  Location: [facility];  Service: Orthopedics;  Laterality: Right;    TONSILLECTOMY       PT Assessment (Last 12 Hours)       PT Evaluation and Treatment       Row Name 25 1418          Physical Therapy Time and Intention    Subjective Information complains of;pain;weakness  -AG     Document Type evaluation  -AG     Mode of Treatment physical therapy  -AG     Patient Effort  adequate  -AG     Symptoms Noted During/After Treatment dizziness  -AG       Row Name 05/07/25 1418          General Information    Patient Profile Reviewed yes  -AG     Onset of Illness/Injury or Date of Surgery 05/02/25  -AG     Referring Physician Dr. Harrell  -AG     Patient Observations alert;cooperative;agree to therapy  -AG     Patient/Family/Caregiver Comments/Observations pt. supine in bed on room air; R knee immobilizer and cryocuff in place.  Granddaughter at bedside.  Pt. requires encouragement for participation.  -AG     Prior Level of Function --  pt. is poor historian with regards to PLOF; decr insight into current deficits.  -AG     Equipment Currently Used at Home walker, rolling  -AG     Pertinent History of Current Functional Problem pt. sustained fall resulting in R tibial plateau fx; underwent ORIF with orders for strict NWB.  -AG     Existing Precautions/Restrictions fall;non-weight bearing  -AG     Limitations/Impairments safety/cognitive  -AG     Equipment Issued to Patient gait belt  -AG     Risks Reviewed patient and family:;increased discomfort;dizziness  -AG     Benefits Reviewed patient and family:;improve function;increase independence;increase strength;increase balance;increase knowledge;decrease risk of DVT  -AG     Barriers to Rehab previous functional deficit;cognitive status  -AG       Row Name 05/07/25 1418          Previous Level of Function/Home Environm    Household Ambulation, Previous Functional Level uses device or equipment  -AG     Community Ambulation, Previous Functional Level uses device or equipment  -AG       Row Name 05/07/25 1418          Living Environment    Current Living Arrangements home  -AG     Home Accessibility --  has one small threshold to enter ground floor apartment  -AG     People in Home alone  -AG     Primary Care Provided by self  -AG       Row Name 05/07/25 1418          Home Use of Assistive/Adaptive Equipment    Equipment Currently Used at Home  walker, rolling  -AG       Row Name 05/07/25 1418          Pain    Pain Location extremity  -AG     Pain Side/Orientation right;lower  -AG       Row Name 05/07/25 1418          Cognition    Affect/Mental Status (Cognition) --  irritable  -AG     Orientation Status (Cognition) oriented to;person;place  decr insight into situation; also decr orientation to time, believes she just came out of surgery today.  -AG     Follows Commands (Cognition) verbal cues/prompting required;repetition of directions required  -AG     Personal Safety Interventions fall prevention program maintained;gait belt;nonskid shoes/slippers when out of bed;supervised activity  -       Row Name 05/07/25 1418          Range of Motion (ROM)    Range of Motion --  L LE AAROM WFL; limited B shoulder ROM; R ankle AROM WFL  -       Row Name 05/07/25 1418          Strength (Manual Muscle Testing)    Strength (Manual Muscle Testing) --  R ankle DF/ PF WFL  -       Row Name 05/07/25 1418          Mobility    Extremity Weight-bearing Status right lower extremity  -AG     Right Lower Extremity (Weight-bearing Status) non weight-bearing (NWB)  -       Row Name 05/07/25 1418          Bed Mobility    Bed Mobility rolling left;rolling right;scooting/bridging;supine-sit;sit-supine  -AG     Rolling Left [place] (Bed Mobility) verbal cues;nonverbal cues (demo/gesture);maximum assist (25% patient effort)  -AG     Rolling Right [place] (Bed Mobility) verbal cues;nonverbal cues (demo/gesture);maximum assist (25% patient effort)  -AG     Scooting/Bridging [place] (Bed Mobility) minimum assist (75% patient effort)  -AG     Supine-Sit [place] (Bed Mobility) verbal cues;nonverbal cues (demo/gesture);maximum assist (25% patient effort)  -AG     Sit-Supine [place] (Bed Mobility) verbal cues;nonverbal cues (demo/gesture);maximum assist (25% patient effort);2 person assist  -AG     Bed Mobility, Safety Issues decreased use of legs for  bridging/pushing;decreased use of arms for pushing/pulling;cognitive deficits limit understanding  -AG     Assistive Device (Bed Mobility) bed rails;repositioning sheet  -AG       Row Name 05/07/25 1418          Transfers    Transfers sit-stand transfer;stand-sit transfer  -AG     Maintains Weight-bearing Status (Transfers) able to maintain  -AG       Row Name 05/07/25 1418          Sit-Stand Transfer    Sit-Stand [place] (Transfers) verbal cues;nonverbal cues (demo/gesture);moderate assist (50% patient effort);2 person assist;maximum assist (25% patient effort)  -AG     Assistive Device (Sit-Stand Transfers) walker, front-wheeled  -AG       Row Name 05/07/25 1418          Stand-Sit Transfer    Stand-Sit [place] (Transfers) verbal cues;nonverbal cues (demo/gesture);moderate assist (50% patient effort);2 person assist  -AG     Assistive Device (Stand-Sit Transfers) walker, front-wheeled  -AG       Row Name 05/07/25 1418          Gait/Stairs (Locomotion)    Patient was able to Ambulate no, other medical factors prevent ambulation  -AG     Reason Patient was unable to Ambulate Excessive Weakness  -AG       Row Name 05/07/25 1418          Safety Issues/Impairments Affecting Functional Mobility    Safety Issues Affecting Function (Mobility) insight into deficits/self-awareness;safety precautions follow-through/compliance;safety precaution awareness  -       Row Name 05/07/25 1418          Balance    Balance Assessment sitting static balance;sitting dynamic balance;sit to stand dynamic balance;standing static balance;standing dynamic balance  -     Static Sitting Balance standby assist  -AG     Position, Sitting Balance unsupported;sitting edge of bed  -     Static Standing Balance verbal cues;non-verbal cues (demo/gesture);2-person assist;moderate assist;maximum assist  -AG       Row Name 05/07/25 1418          Motor Skills    Therapeutic Exercise hip;knee;ankle  -       Row Name 05/07/25 1418           Ankle (Therapeutic Exercise)    Ankle (Therapeutic Exercise) AROM (active range of motion)  -AG     Ankle AROM (Therapeutic Exercise) right;dorsiflexion;plantarflexion;supine  -AG       Row Name             Wound 05/06/25 1609 Right anterior knee Surgical Open Surgical Incision    Wound - Properties Group Placement Date: 05/06/25  -CE Placement Time: 1609  -CE Side: Right  -CE Orientation: anterior  -CE Location: knee  -CE Primary Wound Type: Surgical  -CE Secondary Wound Type - Surgical: Open Surg  -CE    Retired Wound - Properties Group Placement Date: 05/06/25  -CE Placement Time: 1609  -CE Side: Right  -CE Orientation: anterior  -CE Location: knee  -CE    Retired Wound - Properties Group Placement Date: 05/06/25  -CE Placement Time: 1609  -CE Side: Right  -CE Orientation: anterior  -CE Location: knee  -CE    Retired Wound - Properties Group Date first assessed: 05/06/25  -CE Time first assessed: 1609  -CE Side: Right  -CE Location: knee  -CE      Row Name 05/07/25 1418          Coping    Observed Emotional State irritable  -     Verbalized Emotional State acceptance  -AG     Trust Relationship/Rapport care explained;choices provided;thoughts/feelings acknowledged  -     Family/Support Persons --  granddaughter  -AG     Involvement in Care at bedside  -AG     Family/Support System Care support provided;self-care encouraged  -AG       Row Name 05/07/25 1418          Plan of Care Review    Plan of Care Reviewed With patient  -AG     Outcome Evaluation PT evaluation complete.  Pt. requires mod/ max A x 2 for functional mobility skills; able to stand w/ RW and maintain R LE NWB x 2 trials.  Pt. able to clear hips from bed but not achieve upright stance.  Discussed importance of maintaining NWB R LE, importance of mobilization.  Pt. will require ongoing skilled PT to achieve optimal safe function.  Will continue to follow.  -       Row Name 05/07/25 1418          Positioning and Restraints    Pre-Treatment  Position in bed  -AG     Post Treatment Position bed  -AG     In Bed supine;call light within reach;encouraged to call for assist;exit alarm on;with family/caregiver;with nsg;side rails up x3;RLE elevated  -AG       Row Name 05/07/25 1418          Therapy Assessment/Plan (PT)    Patient/Family Therapy Goals Statement (PT) return home to [facility]  -     Functional Level at Time of Evaluation (PT) mod/ max A x 2  -AG     PT Diagnosis (PT) impaired functional mobility  -AG     Rehab Potential (PT) good  -AG     Criteria for Skilled Interventions Met (PT) yes;meets criteria;skilled treatment is necessary  -AG     Therapy Frequency (PT) 6 times/wk  Monday-Saturday  -AG     Predicted Duration of Therapy Intervention (PT) LOS  -AG     Problem List (PT) problems related to;balance;cognition;mobility;strength;pain;range of motion (ROM)  -AG     Activity Limitations Related to Problem List (PT) unable to ambulate safely;unable to transfer safely;BADLs not performed adequately or safely  -AG       Row Name 05/07/25 1418          Therapy Plan Review/Discharge Plan (PT)    Therapy Plan Review (PT) evaluation/treatment results reviewed;care plan/treatment goals reviewed;risks/benefits reviewed;current/potential barriers reviewed;participants voiced agreement with care plan;participants included;patient  -AG       Row Name 05/07/25 1418          Physical Therapy Goals    Bed Mobility Goal Selection (PT) bed mobility, PT goal 1  -AG     Transfer Goal Selection (PT) transfer, PT goal 1;transfer, PT goal 2  -AG       Row Name 05/07/25 1418          Bed Mobility Goal 1 (PT)    Activity/Assistive Device (Bed Mobility Goal 1, PT) rolling to left;rolling to right;sit to supine;scooting;supine to sit  -AG     [place] Level/Cues Needed (Bed Mobility Goal 1, PT) minimum assist (75% or more patient effort)  -AG     Time Frame (Bed Mobility Goal 1, PT) by discharge  -AG       Row Name 05/07/25 1418          Transfer Goal 1 (PT)     Activity/Assistive Device (Transfer Goal 1, PT) sit-to-stand/stand-to-sit;walker, rolling  -AG     [place] Level/Cues Needed (Transfer Goal 1, PT) moderate assist (50-74% patient effort)  -AG     Time Frame (Transfer Goal 1, PT) by discharge  -AG       Row Name 05/07/25 1418          Transfer Goal 2 (PT)    Activity/Assistive Device (Transfer Goal 2, PT) bed-to-chair/chair-to-bed;toilet;walker, rolling  -AG     [place] Level/Cues Needed (Transfer Goal 2, PT) moderate assist (50-74% patient effort)  -AG     Time Frame (Transfer Goal 2, PT) by discharge  -AG               User Key  (r) = Recorded By, (t) = Taken By, (c) = Cosigned By      Initials Name Provider Type     Erika Villalpando, PT Physical Therapist    Lee Petit RN Registered Nurse                    Physical Therapy Education       Title: PT OT SLP Therapies (Done)       Topic: Physical Therapy (Done)       Point: Mobility training (Done)       Learning Progress Summary            Patient Acceptance, E,D, VU,NR by  at 5/7/2025 1417   Family Acceptance, E,D, VU,NR by  at 5/7/2025 1417                      Point: Home exercise program (Done)       Learning Progress Summary            Patient Acceptance, E,D, VU,NR by  at 5/7/2025 1417   Family Acceptance, E,D, VU,NR by  at 5/7/2025 1417                      Point: Body mechanics (Done)       Learning Progress Summary            Patient Acceptance, E,D, VU,NR by  at 5/7/2025 1417   Family Acceptance, E,D, VU,NR by  at 5/7/2025 1417                      Point: Precautions (Done)       Learning Progress Summary            Patient Acceptance, E,D, VU,NR by  at 5/7/2025 1417   Family Acceptance, E,D, VU,NR by  at 5/7/2025 1417                                      User Key       Initials Effective Dates Name Provider Type Discipline     06/16/21 -  Erika Villalpando, PT Physical Therapist PT                  PT Recommendation and Plan  Anticipated Discharge Disposition (PT):  inpatient rehabilitation facility  Planned Therapy Interventions (PT): balance training, bed mobility training, gait training, home exercise program, transfer training, strengthening, ROM (range of motion), postural re-education, patient/family education  Therapy Frequency (PT): 6 times/wk (Monday-Saturday)  Plan of Care Reviewed With: patient  Outcome Evaluation: PT evaluation complete.  Pt. requires mod/ max A x 2 for functional mobility skills; able to stand w/ RW and maintain R LE NWB x 2 trials.  Pt. able to clear hips from bed but not achieve upright stance.  Discussed importance of maintaining NWB R LE, importance of mobilization.  Pt. will require ongoing skilled PT to achieve optimal safe function.  Will continue to follow.       Time Calculation:    PT Charges       Row Name 05/07/25 1415             Time Calculation    PT Received On 05/07/25  -      PT - Next Appointment 05/08/25  -      PT Goal Re-Cert Due Date 05/21/25  -                User Key  (r) = Recorded By, (t) = Taken By, (c) = Cosigned By      Initials Name Provider Type     Erika Villalpando, PT Physical Therapist                  Therapy Charges for Today       Code Description Service Date Service Provider Modifiers Qty    [id number] HC PT EVAL HIGH COMPLEXITY 4 5/7/2025 Erika Villalpando, PT GP 1            PT G-Codes  AM-PAC 6 Clicks Score (PT): 11    Erika Villalpando PT  5/7/2025

## 2025-05-07 NOTE — PLAN OF CARE
Goal Outcome Evaluation:               Pt resting in bed at this time. Pt dressing remains dry and intact on right leg. Prn pain meds given this shift. No other complaints at this time.

## 2025-05-07 NOTE — PROGRESS NOTES
[facility] HOSPITALIST PROGRESS NOTE    Subjective     History:   Johanna Street is a 79 y.o. female admitted on 5/2/2025 secondary to Tibial plateau fracture, right     Procedures:   5/6/25: Right tibial plateau ORIF and open lateral meniscus repair     CC: Follow up right tibial plateau fracture     Patient seen and examined.  Awake and alert. Some RLE pain and soreness reported. No reported CP. No reported vomiting. No acute events reported.     History taken from: patient, chart, and RN.      Objective     Vital Signs  Temp:  [96.9 °F (36.1 °C)-98.6 °F (37 °C)] 98.6 °F (37 °C)  Heart Rate:  [] 110  Resp:  [12-20] 18  BP: ()/() 90/44    Intake/Output Summary (Last 24 hours) at 5/7/2025 1249  Last data filed at 5/6/2025 1521  Gross per 24 hour   Intake 100 ml   Output 600 ml   Net -500 ml         Physical Exam:   General:    Awake, alert, in no acute distress   Heart:      Normal S1 and S2. Irregularly irregular.    Lungs:     Respirations regular, even and unlabored. Lungs clear to auscultation B/L. No wheezes, rales or rhonchi.   Abdomen:   Soft and nontender. No guarding, rebound tenderness or  organomegaly noted. Bowel sounds present x 4.   Extremities:  No clubbing, cyanosis or  LLE edema noted. (+) knee immobilizer and surgical dressing RLE.      Results Review:    Results from last 7 days   Lab Units 05/07/25  0118 05/05/25  0015 05/03/25 0107 05/02/25  1633   WBC 10*3/mm3 12.19* 9.71 13.41* 11.72*   HEMOGLOBIN g/dL 11.3* 10.3* 12.1 13.7   PLATELETS 10*3/mm3 309 202 225 286     Results from last 7 days   Lab Units 05/07/25  0118 05/05/25  1038 05/05/25  0015 05/03/25 0107 05/02/25  1633   SODIUM mmol/L 134*  --  138 131* 135*   POTASSIUM mmol/L 5.2 4.3 3.6 5.0 4.3   CHLORIDE mmol/L 96*  --  106 96* 97*   CO2 mmol/L 25.6  --  20.6* 21.0* 21.7*   BUN mg/dL 9  --  8 11 11   CREATININE mg/dL 0.79  --  0.68 0.89 0.96   CALCIUM mg/dL 8.9  --  8.1* 9.1 9.7   GLUCOSE mg/dL 127*   --  144* 138* 160*     Results from last 7 days   Lab Units 05/07/25  0118 05/03/25  0107 05/02/25  1633   BILIRUBIN mg/dL 1.0 1.4* 1.1   ALK PHOS U/L 135* 109 120*   AST (SGOT) U/L 45* 33* 20   ALT (SGPT) U/L 23 16 14     Results from last 7 days   Lab Units 05/02/25  1633   MAGNESIUM mg/dL 2.0               Imaging Results (Last 24 Hours)       Procedure Component Value Units Date/Time    FL Surgery Fluoro [[id number]] Collected: 05/07/25 0759     Updated: 05/07/25 0802    Narrative:      EXAMINATION: FL SURGERY FLUORO-      CLINICAL INDICATION: tibfid; S82.141A-Displaced bicondylar fracture of  right tibia, initial encounter for closed fracture        COMPARISON: None available     Total fluoroscopy time 210.3 seconds     Fluoroscopy was provided and 8 images were acquired during surgery     For a full procedural report, please see the report provided by the  performing physician        This report was finalized on 5/7/2025 8:00 AM by Dr. Lane Boothe MD.                 Medications:  acetaminophen, 1,000 mg, Oral, Q8H  ceFAZolin, 2,000 mg, Intravenous, Q8H  diazePAM, 5 mg, Oral, Nightly  enoxaparin sodium, 30 mg, Subcutaneous, Q12H  furosemide, 20 mg, Oral, Daily  pantoprazole, 40 mg, Oral, Q AM  senna-docusate sodium, 2 tablet, Oral, BID  sodium chloride, 10 mL, Intravenous, Q12H  sotalol, 80 mg, Oral, BID               Assessment & Plan   Right lateral tibial plateau fracture with questionable non-displaced right fibular shaft fracture: S/P right lateral tibial plateau ORIF and lateral meniscus repair. Currently NWB with knee immobilizer. Pain control. Ortho input appreciated.     Chronic Afib: Currently rate controlled. Cont sotalol. Holding Eliquis perioperatively. Restart when okay with ortho.      Essential HTN: BP has fluctuated but overall stable. Cont to monitor.     GERD: Cont PPI.     DVT PPX: Lovenox per ortho recs.    Disposition Likely SNF placement pending post-op course.     Maurilio HUERTAS  DO Jorge  05/07/25  12:49 EDT

## 2025-05-08 LAB
ALBUMIN SERPL-MCNC: 2.7 G/DL (ref 3.5–5.2)
ALBUMIN/GLOB SERPL: 0.9 G/DL
ALP SERPL-CCNC: 152 U/L (ref 39–117)
ALT SERPL W P-5'-P-CCNC: 13 U/L (ref 1–33)
ANION GAP SERPL CALCULATED.3IONS-SCNC: 12 MMOL/L (ref 5–15)
AST SERPL-CCNC: 33 U/L (ref 1–32)
BASOPHILS # BLD AUTO: 0.06 10*3/MM3 (ref 0–0.2)
BASOPHILS NFR BLD AUTO: 0.7 % (ref 0–1.5)
BILIRUB SERPL-MCNC: 0.9 MG/DL (ref 0–1.2)
BUN SERPL-MCNC: 10 MG/DL (ref 8–23)
BUN/CREAT SERPL: 13.3 (ref 7–25)
CALCIUM SPEC-SCNC: 8.6 MG/DL (ref 8.6–10.5)
CHLORIDE SERPL-SCNC: 92 MMOL/L (ref 98–107)
CO2 SERPL-SCNC: 24 MMOL/L (ref 22–29)
CREAT SERPL-MCNC: 0.75 MG/DL (ref 0.57–1)
DEPRECATED RDW RBC AUTO: 46.1 FL (ref 37–54)
EGFRCR SERPLBLD CKD-EPI 2021: 81.6 ML/MIN/1.73
EOSINOPHIL # BLD AUTO: 0.35 10*3/MM3 (ref 0–0.4)
EOSINOPHIL NFR BLD AUTO: 3.9 % (ref 0.3–6.2)
ERYTHROCYTE [DISTWIDTH] IN BLOOD BY AUTOMATED COUNT: 13.3 % (ref 12.3–15.4)
GLOBULIN UR ELPH-MCNC: 3.1 GM/DL
GLUCOSE SERPL-MCNC: 134 MG/DL (ref 65–99)
HCT VFR BLD AUTO: 31.8 % (ref 34–46.6)
HGB BLD-MCNC: 10.2 G/DL (ref 12–15.9)
IMM GRANULOCYTES # BLD AUTO: 0.04 10*3/MM3 (ref 0–0.05)
IMM GRANULOCYTES NFR BLD AUTO: 0.4 % (ref 0–0.5)
LYMPHOCYTES # BLD AUTO: 1.6 10*3/MM3 (ref 0.7–3.1)
LYMPHOCYTES NFR BLD AUTO: 17.6 % (ref 19.6–45.3)
MCH RBC QN AUTO: 30.2 PG (ref 26.6–33)
MCHC RBC AUTO-ENTMCNC: 32.1 G/DL (ref 31.5–35.7)
MCV RBC AUTO: 94.1 FL (ref 79–97)
MONOCYTES # BLD AUTO: 0.57 10*3/MM3 (ref 0.1–0.9)
MONOCYTES NFR BLD AUTO: 6.3 % (ref 5–12)
NEUTROPHILS NFR BLD AUTO: 6.46 10*3/MM3 (ref 1.7–7)
NEUTROPHILS NFR BLD AUTO: 71.1 % (ref 42.7–76)
NRBC BLD AUTO-RTO: 0 /100 WBC (ref 0–0.2)
PLATELET # BLD AUTO: 276 10*3/MM3 (ref 140–450)
PMV BLD AUTO: 9.4 FL (ref 6–12)
POTASSIUM SERPL-SCNC: 4 MMOL/L (ref 3.5–5.2)
PROT SERPL-MCNC: 5.8 G/DL (ref 6–8.5)
QT INTERVAL: 366 MS
QT INTERVAL: 368 MS
QT INTERVAL: 400 MS
QTC INTERVAL: 440 MS
QTC INTERVAL: 467 MS
QTC INTERVAL: 504 MS
RBC # BLD AUTO: 3.38 10*6/MM3 (ref 3.77–5.28)
SODIUM SERPL-SCNC: 128 MMOL/L (ref 136–145)
WBC NRBC COR # BLD AUTO: 9.08 10*3/MM3 (ref 3.4–10.8)

## 2025-05-08 PROCEDURE — 97110 THERAPEUTIC EXERCISES: CPT

## 2025-05-08 PROCEDURE — 25010000002 ENOXAPARIN PER 10 MG: Performed by: ORTHOPAEDIC SURGERY

## 2025-05-08 PROCEDURE — 25010000002 CEFAZOLIN PER 500 MG: Performed by: ORTHOPAEDIC SURGERY

## 2025-05-08 PROCEDURE — 99232 SBSQ HOSP IP/OBS MODERATE 35: CPT | Performed by: INTERNAL MEDICINE

## 2025-05-08 PROCEDURE — 97530 THERAPEUTIC ACTIVITIES: CPT

## 2025-05-08 PROCEDURE — 93005 ELECTROCARDIOGRAM TRACING: CPT

## 2025-05-08 PROCEDURE — 80053 COMPREHEN METABOLIC PANEL: CPT | Performed by: INTERNAL MEDICINE

## 2025-05-08 PROCEDURE — 93010 ELECTROCARDIOGRAM REPORT: CPT | Performed by: STUDENT IN AN ORGANIZED HEALTH CARE EDUCATION/TRAINING PROGRAM

## 2025-05-08 PROCEDURE — 85025 COMPLETE CBC W/AUTO DIFF WBC: CPT | Performed by: INTERNAL MEDICINE

## 2025-05-08 PROCEDURE — 25810000003 SODIUM CHLORIDE 0.9 % SOLUTION: Performed by: INTERNAL MEDICINE

## 2025-05-08 RX ORDER — SODIUM CHLORIDE 9 MG/ML
100 INJECTION, SOLUTION INTRAVENOUS CONTINUOUS
Status: DISCONTINUED | OUTPATIENT
Start: 2025-05-08 | End: 2025-05-09 | Stop reason: HOSPADM

## 2025-05-08 RX ADMIN — ENOXAPARIN SODIUM 30 MG: 30 INJECTION SUBCUTANEOUS at 00:28

## 2025-05-08 RX ADMIN — Medication 10 ML: at 20:39

## 2025-05-08 RX ADMIN — ACETAMINOPHEN 1000 MG: 500 TABLET ORAL at 00:28

## 2025-05-08 RX ADMIN — CEFAZOLIN 2000 MG: 2 INJECTION, POWDER, FOR SOLUTION INTRAMUSCULAR; INTRAVENOUS at 00:29

## 2025-05-08 RX ADMIN — ACETAMINOPHEN 1000 MG: 500 TABLET ORAL at 08:38

## 2025-05-08 RX ADMIN — SENNOSIDES AND DOCUSATE SODIUM 2 TABLET: 50; 8.6 TABLET ORAL at 08:38

## 2025-05-08 RX ADMIN — CEFAZOLIN 2000 MG: 2 INJECTION, POWDER, FOR SOLUTION INTRAMUSCULAR; INTRAVENOUS at 08:38

## 2025-05-08 RX ADMIN — DIAZEPAM 5 MG: 5 TABLET ORAL at 20:38

## 2025-05-08 RX ADMIN — SODIUM CHLORIDE 100 ML/HR: 9 INJECTION, SOLUTION INTRAVENOUS at 16:36

## 2025-05-08 RX ADMIN — ENOXAPARIN SODIUM 30 MG: 30 INJECTION SUBCUTANEOUS at 13:28

## 2025-05-08 RX ADMIN — SOTALOL HYDROCHLORIDE 80 MG: 80 TABLET ORAL at 08:38

## 2025-05-08 RX ADMIN — SENNOSIDES AND DOCUSATE SODIUM 2 TABLET: 50; 8.6 TABLET ORAL at 20:38

## 2025-05-08 RX ADMIN — PANTOPRAZOLE SODIUM 40 MG: 40 TABLET, DELAYED RELEASE ORAL at 06:18

## 2025-05-08 RX ADMIN — CEFAZOLIN 2000 MG: 2 INJECTION, POWDER, FOR SOLUTION INTRAMUSCULAR; INTRAVENOUS at 16:36

## 2025-05-08 RX ADMIN — ACETAMINOPHEN 1000 MG: 500 TABLET ORAL at 16:36

## 2025-05-08 RX ADMIN — SOTALOL HYDROCHLORIDE 80 MG: 80 TABLET ORAL at 20:38

## 2025-05-08 NOTE — CASE MANAGEMENT/SOCIAL WORK
Discharge Planning Assessment  VERONICA Denson     Patient Name: Johanna Street  MRN: [id number]  Today's Date: 5/8/2025    Admit Date: 5/2/2025         Discharge Plan       Row Name 05/08/25 1333       Plan    Plan  pre-auth team was approved until 05/15/25 Auth#[id number]. SS notified [facility] per Oanh and faxed document with auth information to fax [phone]. [facility] per Oanh to come to bedside to speak with Pt about an open workman's comp claim before making decision about possible acceptance. SS to follow.                  Continued Care and Services - Admitted Since 5/2/2025       Destination       Service Provider Request Status Services Address Phone Fax Patient Preferred    [facility] Pending - bedside visit from [facility] -- [address] [place] [address] [phone] --                    JESUS MckeonW

## 2025-05-08 NOTE — PLAN OF CARE
Goal Outcome Evaluation:   Pt rested well this shift,very confused,went in and out of afib dig given,vss,nadn,wctm

## 2025-05-08 NOTE — THERAPY TREATMENT NOTE
Acute Care - Occupational Therapy Treatment Note   Jarett     Patient Name: Johanna Street  : 1945  MRN: [id number]  Today's Date: 2025  Onset of Illness/Injury or Date of Surgery: 25     Referring Physician: Dr. Harrell    Admit Date: 2025       ICD-10-CM ICD-9-CM   1. Closed fracture of right tibial plateau, initial encounter  S82.141A 823.00     Patient Active Problem List   Diagnosis    PAF (paroxysmal atrial fibrillation) 2013. [id number] score is 2.    Ankle edema    Depression    Anxiety    Paraesophageal hernia    Gastroesophageal reflux disease    Dysphagia    Abnormal EKG, left anterior fascicular block    Chronic anticoagulation    Tibial plateau fracture, right     Past Medical History:   Diagnosis Date    Ankle edema     Anxiety     Arthritis     Atrial fibrillation     Back pain     Depression     Hernia, hiatal     PAF (paroxysmal atrial fibrillation)      Past Surgical History:   Procedure Laterality Date    APPENDECTOMY      CARDIAC CATHETERIZATION  2013    CHOLECYSTECTOMY      COLON SURGERY      COLONOSCOPY      ENDOSCOPY N/A 2017    Procedure: ESOPHAGOGASTRODUODENOSCOPY WITH ANESTHESIA;  Surgeon: Eliseo Moon MD;  [place] [facility]  Service:     ENDOSCOPY N/A 2018    Procedure: ESOPHAGOGASTRODUODENOSCOPY WITH DILATATION CPT CODE: 55939;  Surgeon: Tomi Dey III, MD;  [place] [facility] [place];  Service:     EYE SURGERY      HYSTERECTOMY      NERVE REPAIR      TIBIAL PLATEAU OPEN REDUCTION INTERNAL FIXATION Right 2025    Procedure: TIBIAL PLATEAU OPEN REDUCTION INTERNAL FIXATION;  Surgeon: Bon Harrell DO;  [place] [facility] [place];  Service: Orthopedics;  Laterality: Right;    TONSILLECTOMY           OT ASSESSMENT FLOWSHEET (Last 12 Hours)       OT Evaluation and Treatment       Row Name 25 1231                   OT Time and Intention    Subjective Information complains of;pain  -LA        Document Type therapy note (daily note)   -LA        Mode of Treatment occupational therapy  -LA        Patient Effort good  -LA        Symptoms Noted During/After Treatment fatigue  -LA           General Information    Patient Profile Reviewed yes  -LA        General Observations of Patient Patient agreeable to treatment this date. Patient with significant deficits with UE strength and ROM which limits patients tolerance of standing and maintaining WB status. Patient tolerated stand x1 this date with maxA x2 person. Patient faigues quickly.  -LA        Existing Precautions/Restrictions fall;non-weight bearing  -LA           Cognition    Affect/Mental Status (Cognition) WFL  -LA        Orientation Status (Cognition) oriented x 3  -LA        Follows Commands (Cognition) WFL  -LA           Bed Mobility    Supine-Sit [place] (Bed Mobility) maximum assist (25% patient effort);2 person assist  -LA        Sit-Supine [place] (Bed Mobility) maximum assist (25% patient effort);2 person assist  -LA           Sit-Stand Transfer    Sit-Stand [place] (Transfers) maximum assist (25% patient effort);2 person assist  -LA           Stand-Sit Transfer    Stand-Sit [place] (Transfers) maximum assist (25% patient effort);2 person assist  -LA           Motor Skills    Motor Skills coordination;functional endurance  -LA        Coordination WFL  -LA        Functional Endurance poor+  -LA        Therapeutic Exercise shoulder;elbow/forearm;wrist;hand  -LA           Wound 05/06/25 1609 Right anterior knee Surgical Open Surgical Incision    Wound - Properties Group Placement Date: 05/06/25  -CE Placement Time: 1609  -CE Side: Right  -CE Orientation: anterior  -CE Location: knee  -CE Primary Wound Type: Surgical  -CE Secondary Wound Type - Surgical: Open Surg  -CE    Retired Wound - Properties Group Placement Date: 05/06/25  -CE Placement Time: 1609  -CE Side: Right  -CE Orientation: anterior  -CE Location: knee  -CE    Retired Wound - Properties Group Placement  Date: 05/06/25  -CE Placement Time: 1609  -CE Side: Right  -CE Orientation: anterior  -CE Location: knee  -CE    Retired Wound - Properties Group Date first assessed: 05/06/25  -CE Time first assessed: 1609  -CE Side: Right  -CE Location: knee  -CE       Wound 05/07/25 1756 Left proximal arm Infiltration/Extravasation    Wound - Properties Group Placement Date: 05/07/25  -EB Placement Time: 1756  -EB Side: Left  -EB Orientation: proximal  -EB Location: arm  -EB Primary Wound Type: Infilt/Extra  -EB    Retired Wound - Properties Group Placement Date: 05/07/25  -EB Placement Time: 1756  -EB Side: Left  -EB Orientation: proximal  -EB Location: arm  -EB    Retired Wound - Properties Group Placement Date: 05/07/25  -EB Placement Time: 1756  -EB Side: Left  -EB Orientation: proximal  -EB Location: arm  -EB    Retired Wound - Properties Group Date first assessed: 05/07/25  -EB Time first assessed: 1756  -EB Side: Left  -EB Location: arm  -EB       Plan of Care Review    Plan of Care Reviewed With patient  -LA           Positioning and Restraints    Pre-Treatment Position in bed  -LA        Post Treatment Position bed  -LA        In Bed supine;call light within reach;encouraged to call for assist;exit alarm on  -LA                  User Key  (r) = Recorded By, (t) = Taken By, (c) = Cosigned By      Initials Name Effective Dates    CE Lee Zamora, СВЕТЛАНА 06/16/21 -     Kannan Moseley RN 09/22/22 -     Anila Jolley OT 02/14/22 -                            OT Recommendation and Plan     Plan of Care Review  Plan of Care Reviewed With: patient  Plan of Care Reviewed With: patient        Time Calculation:     Therapy Charges for Today       Code Description Service Date Service Provider Modifiers Qty    [id number]  OT THERAPEUTIC ACT EA 15 MIN 5/8/2025 Anila Braun OT GO 1    [id number] HC OT THER PROC EA 15 MIN 5/8/2025 Anila Braun OT GO 1                 Anila Braun OT  5/8/2025

## 2025-05-08 NOTE — PROGRESS NOTES
[facility] HOSPITALIST PROGRESS NOTE    Subjective     History:   Johanna Street is a 79 y.o. female admitted on 5/2/2025 secondary to Tibial plateau fracture, right     Procedures:   5/6/25: Right tibial plateau ORIF and open lateral meniscus repair     CC: Follow up right tibial plateau fracture     Patient seen and examined with therapy present.  Awake and alert. Oriented during my encounter. Some RLE pain reported. No reported CP. No reported vomiting. Increased confusion reported overnight. HR briefly elevated yesterday evening. No acute events reported.     History taken from: patient, chart, and RN.      Objective     Vital Signs  Temp:  [95.7 °F (35.4 °C)-98.2 °F (36.8 °C)] 97.8 °F (36.6 °C)  Heart Rate:  [] 65  Resp:  [16-18] 18  BP: (100-133)/(52-89) 105/59    Intake/Output Summary (Last 24 hours) at 5/8/2025 1349  Last data filed at 5/8/2025 0500  Gross per 24 hour   Intake 120 ml   Output 900 ml   Net -780 ml         Physical Exam: Unchanged from previous.   General:    Awake, alert, in no acute distress   Heart:      Normal S1 and S2. Irregularly irregular.    Lungs:     Respirations regular, even and unlabored. Lungs clear to auscultation B/L. No wheezes, rales or rhonchi.   Abdomen:   Soft and nontender. No guarding, rebound tenderness or  organomegaly noted. Bowel sounds present x 4.   Extremities:  No clubbing, cyanosis or  LLE edema noted. (+) knee immobilizer and surgical dressing RLE.      Results Review:    Results from last 7 days   Lab Units 05/08/25  0127 05/07/25  0118 05/05/25  0015 05/03/25  0107 05/02/25  1633   WBC 10*3/mm3 9.08 12.19* 9.71 13.41* 11.72*   HEMOGLOBIN g/dL 10.2* 11.3* 10.3* 12.1 13.7   PLATELETS 10*3/mm3 276 309 202 225 286     Results from last 7 days   Lab Units 05/08/25  0127 05/07/25  0118 05/05/25  1038 05/05/25  0015 05/03/25  0107 05/02/25  1633   SODIUM mmol/L 128* 134*  --  138 131* 135*   POTASSIUM mmol/L 4.0 5.2 4.3 3.6 5.0 4.3   CHLORIDE  mmol/L 92* 96*  --  106 96* 97*   CO2 mmol/L 24.0 25.6  --  20.6* 21.0* 21.7*   BUN mg/dL 10 9  --  8 11 11   CREATININE mg/dL 0.75 0.79  --  0.68 0.89 0.96   CALCIUM mg/dL 8.6 8.9  --  8.1* 9.1 9.7   GLUCOSE mg/dL 134* 127*  --  144* 138* 160*     Results from last 7 days   Lab Units 05/08/25  0127 05/07/25  0118 05/03/25  0107 05/02/25  1633   BILIRUBIN mg/dL 0.9 1.0 1.4* 1.1   ALK PHOS U/L 152* 135* 109 120*   AST (SGOT) U/L 33* 45* 33* 20   ALT (SGPT) U/L 13 23 16 14     Results from last 7 days   Lab Units 05/02/25  1633   MAGNESIUM mg/dL 2.0               Imaging Results (Last 24 Hours)       ** No results found for the last 24 hours. **              Medications:  acetaminophen, 1,000 mg, Oral, Q8H  ceFAZolin, 2,000 mg, Intravenous, Q8H  diazePAM, 5 mg, Oral, Nightly  enoxaparin sodium, 30 mg, Subcutaneous, Q12H  [Held by provider] furosemide, 20 mg, Oral, Daily  pantoprazole, 40 mg, Oral, Q AM  senna-docusate sodium, 2 tablet, Oral, BID  sodium chloride, 10 mL, Intravenous, Q12H  sotalol, 80 mg, Oral, BID      sodium chloride, 100 mL/hr            Assessment & Plan   Right lateral tibial plateau fracture with questionable non-displaced right fibular shaft fracture: S/P right lateral tibial plateau ORIF and lateral meniscus repair. Currently NWB with knee immobilizer. Pain control. Ortho input appreciated.     Chronic Afib: Rate briefly elevated on 5/7 but improved. S/P digoxin. Cont sotalol. Holding Eliquis perioperatively. Restart when okay with ortho. Monitor on telemetry.      Essential HTN: BP has fluctuated and borderline low but overall stable. Cont to monitor.     Mild hyponatremia: Decreased PO intake reported. Hold home PO Lasix. Start maintenance IVF's. Repeat labs in the AM.     GERD: Cont PPI.     DVT PPX: Lovenox per ortho recs.    Disposition SS working to arrange SNF placement.     Maurilio Patel DO  05/08/25  13:49 EDT

## 2025-05-08 NOTE — PAYOR COMM NOTE
"Vaughn Thomas RN  Swing Bed Nurse  [phone] Ex [phone] FAX: [phone]  [email or web address]  [facility]  NPI [id number]  Reference Number     Patient needs post acute SNF placement for continued PT/OT strength, mobility, transfer and ADL training. PLOF lives alone independently and plans to return when she can safely do so.    ICD10  S82.141A  R53.81  E87.20      YeniferReva (79 y.o. Female)       Date of Birth   1945    Social Security Number       Address   [address] [place] [address]    Home Phone   [phone]    MRN   [id number]       [facility]    Marital Status                               Admission Date   5/2/2025    Admission Type   Emergency    Admitting Provider   Pia Wren DO    Attending Provider   Maurilio Patel DO    Department, Room/Bed   [address], [address]/       Discharge Date       Discharge Disposition       Discharge Destination                                 Attending Provider: Maurilio Patel DO    Allergies: Codeine, Diltiazem, Latex, Zinc, Levaquin [Levofloxacin], Penicillins    Isolation: None   Infection: None   Code Status: CPR    Ht: 154.9 cm (61\")   Wt: 81.2 kg (179 lb 0.2 oz)    Admission Cmt: None   Principal Problem: Tibial plateau fracture, right [S82.141A]                   Active Insurance as of 5/2/2025       Primary Coverage       Payor Plan Insurance Group Employer/Plan Group    ANTHEM MEDICARE REPLACEMENT ANTHEM MEDICARE ADVANTAGE PPO [id number]       Payor Plan Address Payor Plan Phone Number Payor Plan Fax Number Effective Dates    [address] [phone]  5/1/2024 - None Entered    [facility] [address]         Subscriber Name Subscriber Birth Date Member ID       ESSENCE GARCIA 1945 [id number]                     Emergency Contacts        (Rel.) Home Phone Work Phone Mobile Phone    Rosanne Hargrove (Grandchild) [phone] -- " --    MERE OMALLEY (Sister) [phone] -- --    Chapito Heard (Brother) -- -- [phone]                 History & Physical        Nida Cleaning APRN at [phone]       Attestation signed by Pia Wren DO at 25 [phone]      I have reviewed this documentation, have written and agree. Left lateral tibial plateau fracture s/p mechanical fall/injury prior to admission, mild, leukocytosis, AGMA and PAF on Eliquis.     Hold eliquis pending further recommendations per Orthopedic Surgery. Consult SS as patient states unable to care for herself acutely. UA to r/o URI, CXR most likely consistent with scarring; procalcitonin level is negative and clinical presentation does not support acute pneumonia.  Will change LR to normal saline and continue for approximately 10 hours.                        [facility] Medicine Services  History & Physical    Patient Identification:  Name:  Johanna Street  Age:  79 y.o.  Sex:  female  :  1945  MRN:  [id number]   Visit Number:  [id number]  Admit Date: 2025   Primary Care Physician:  Bi Youssef MD    Subjective     Chief complaint: RLE Pain    History of presenting illness:      Johanna Street is a 79 y.o. female with past medical history significant for atrial fibrillation, chronic anticoagulation with Eliquis, hypertension, history of TIA, depression, anxiety, history of paraesophageal hernia, history of dysphagia, GERD, arthritis, scoliosis, and chronic back pain. Patient presented to [facility] Emergency Department secondary to injury to her right lower extremity. Patient was getting into her car. She states her leg gave out. She slammed leg between the door and the car. She states that she never did actually fall onto the ground and did not hit her head. Also denies loss of consciousness. Denies associated or precipitating symptoms such as dizziness, chest pain, or shortness of breath. Patient does admit that she's  been feeling fatigued recently. States that she has a difficult time using her right arm/right shoulder at baseline due to a remote injury. She states that she broke her right shoulder several years ago and never did have surgery to repair it. Patient states that she lives alone. ED workup positive for right tibial plateau fracture. ED provider discussed with Orthopedic Surgery (Dr. Harrell) who recommended a knee immobilizer and follow-up outpatient for further management. However, patient stated that she would be unable to take care of herself at home and was concerned that she would experience a fall. Family member at bedside stated that patient could not stay with her as she lives 2 counties away and was unable to provide the help that patient would need. Patient confirms that she does take Eliquis for Afib. Last dose of Eliquis was on the morning of 5/2/2025. She states that she is compliant with all her home medications. Denies smoking, alcohol abuse, or illicit substance use. Discussed admission plans with patient. She voiced agreement and understanding with no further questions or concerns at this time.     Upon arrival to the ED, vital signs were temperature 98.2, pulse 106, respirations 16, blood pressure 140/87 sitting, SpO2 saturation 95% on room air. CMP with sodium 135, chloride 97, CO2 21.7, anion gap 16.3, glucose 160, alkaline phosphatase 120, otherwise unremarkable.  Pro-Sorin negative.  CBC with WBCs 11.72, MCHC 31.1, otherwise unremarkable.  X-ray of the right ankle unremarkable.  Chest x-ray noted bilateral lung reticulation.  Atypical infection versus fibrosis.  X-ray of the right femur unremarkable.  X-ray of the right shoulder unremarkable.  X-ray of the right tib-fib noted tibial plateau fracture with possible fibular shaft fracture.  CT of the right lower extremity without contrast noted lateral tibial plateau fracture with depression.    Known Emergency Department medications received prior  to my evaluation included 5 mg oral Valium, 2 mg IV morphine, 80 mg oral sotalol. Emergency Department Room location at the time of my evaluation was 114. Discussed with admitting physician, Pia Larson DO.      ---------------------------------------------------------------------------------------------------------------------   Review of Systems   Constitutional:  Negative for fever.   HENT: Negative.     Respiratory: Negative.     Cardiovascular: Negative.  Negative for chest pain.   Gastrointestinal: Negative.  Negative for abdominal pain.   Endocrine: Negative.    Genitourinary: Negative.  Negative for dysuria.   Musculoskeletal:  Positive for arthralgias and gait problem.   Skin: Negative.    Neurological:  Positive for weakness (generalized).   Psychiatric/Behavioral: Negative.     All other systems reviewed and are negative.    ---------------------------------------------------------------------------------------------------------------------   Past Medical History:   Diagnosis Date    Ankle edema     Anxiety     Arthritis     Atrial fibrillation     Back pain     Depression     Hernia, hiatal     PAF (paroxysmal atrial fibrillation)      Past Surgical History:   Procedure Laterality Date    APPENDECTOMY      CARDIAC CATHETERIZATION  12/2013    CHOLECYSTECTOMY      COLON SURGERY      COLONOSCOPY  2006    ENDOSCOPY N/A 12/7/2017    Procedure: ESOPHAGOGASTRODUODENOSCOPY WITH ANESTHESIA;  Surgeon: Eliseo Moon MD;  Location: [facility] OR;  Service:     ENDOSCOPY N/A 2/21/2018    Procedure: ESOPHAGOGASTRODUODENOSCOPY WITH DILATATION CPT CODE: 72757;  Surgeon: Tomi Dey III, MD;  Location: [facility] OR;  Service:     EYE SURGERY      HYSTERECTOMY      NERVE REPAIR      TONSILLECTOMY       Family History   Problem Relation Age of Onset    Heart attack Mother     Stroke Mother     Coronary artery disease Mother     Diabetes Mother     Hypertension Mother     Heart disease Father     Arthritis  Father     Coronary artery disease Father     Liver disease Brother      Social History     Socioeconomic History    Marital status:    Tobacco Use    Smoking status: Never    Smokeless tobacco: Never   Vaping Use    Vaping status: Never Used   Substance and Sexual Activity    Alcohol use: No    Drug use: No    Sexual activity: Defer     ---------------------------------------------------------------------------------------------------------------------   Allergies:  Codeine, Diltiazem, Zinc, Levaquin [levofloxacin], and Penicillins  ---------------------------------------------------------------------------------------------------------------------   Home medications:    Medications below are reported home medications pulling from within the system; at this time, these medications have not been reconciled unless otherwise specified and are in the verification process for further verifcation as current home medications.  Medications Prior to Admission   Medication Sig Dispense Refill Last Dose/Taking    apixaban (ELIQUIS) 5 MG tablet tablet Take 1 tablet by mouth Every 12 (Twelve) Hours. 180 tablet 3 5/2/2025 Morning    diazepam (VALIUM) 5 MG tablet Take 1 tablet by mouth Daily.   5/2/2025    furosemide (LASIX) 20 MG tablet Take 1 tablet by mouth Daily.   5/2/2025    lansoprazole (PREVACID) 15 MG capsule Take 1 capsule by mouth Daily.   5/2/2025    sotalol (BETAPACE) 80 MG tablet Take 1 tablet by mouth 2 (Two) Times a Day.   5/2/2025 Evening    divalproex (DEPAKOTE) 125 MG DR tablet Take 1 tablet by mouth Daily As Needed (Headache).   Unknown       Hospital Scheduled Meds:  acetaminophen, 1,000 mg, Oral, Q8H  senna-docusate sodium, 2 tablet, Oral, BID  sodium chloride, 10 mL, Intravenous, Q12H      lactated ringers, 75 mL/hr, Last Rate: 75 mL/hr (05/03/25 0039)        Current listed hospital scheduled medications may not yet reflect those currently placed in orders that are signed and held awaiting patient's  arrival to floor.   ---------------------------------------------------------------------------------------------------------------------     Objective     Vital Signs:  Temp:  [98.2 °F (36.8 °C)] 98.2 °F (36.8 °C)  Heart Rate:  [] 105  Resp:  [16-18] 18  BP: ()/(60-97) 134/79      05/02/25  1545 05/03/25  0002   Weight: 78.5 kg (173 lb) 81.2 kg (179 lb 0.2 oz)     Body mass index is 33.82 kg/m².  ---------------------------------------------------------------------------------------------------------------------       Physical Exam  Vitals reviewed.   Constitutional:       General: She is awake. She is not in acute distress.     Appearance: She is ill-appearing (chronically). She is not diaphoretic.   HENT:      Head: Normocephalic and atraumatic.      Mouth/Throat:      Mouth: Mucous membranes are dry.   Eyes:      Extraocular Movements: Extraocular movements intact.      Pupils: Pupils are equal, round, and reactive to light.   Cardiovascular:      Rate and Rhythm: Normal rate. Rhythm irregular.      Pulses: Normal pulses.           Dorsalis pedis pulses are 1+ on the right side and 1+ on the left side.      Heart sounds:      No friction rub.   Pulmonary:      Effort: Pulmonary effort is normal. No accessory muscle usage, respiratory distress or retractions.      Breath sounds: No wheezing, rhonchi or rales.   Abdominal:      General: Bowel sounds are normal. There is no distension.      Palpations: Abdomen is soft.      Tenderness: There is no abdominal tenderness. There is no guarding.   Musculoskeletal:      Cervical back: Neck supple. No rigidity.      Right lower leg: No edema.      Left lower leg: No edema.      Comments: RLE neurovascularly intact.    Skin:     General: Skin is warm and dry.      Capillary Refill: Capillary refill takes 2 to 3 seconds.      Coloration: Skin is pale.   Neurological:      Mental Status: She is alert and oriented to person, place, and time. Mental status is at  "baseline.      Cranial Nerves: No dysarthria or facial asymmetry.      Sensory: Sensation is intact.      Motor: Weakness (generalized) present. No tremor.   Psychiatric:         Attention and Perception: Attention normal.         Mood and Affect: Mood normal.         Speech: Speech normal.         Behavior: Behavior normal. Behavior is cooperative.         Thought Content: Thought content normal.         Cognition and Memory: Cognition normal.         Judgment: Judgment normal.       ---------------------------------------------------------------------------------------------------------------------  EKG:  Appearing Afib with controlled rate in the 90s. Fascicular block. LVH. Confirmed by Cardiology, Dr. Spears.     Telemetry:  Patient was not on bedside monitor in  during my evaluation.   ---------------------------------------------------------------------------------------------------------------------   Results from last 7 days   Lab Units 05/02/25  1633   WBC 10*3/mm3 11.72*   HEMOGLOBIN g/dL 13.7   HEMATOCRIT % 44.1   MCV fL 95.2   MCHC g/dL 31.1*   PLATELETS 10*3/mm3 286         Results from last 7 days   Lab Units 05/02/25  1633   SODIUM mmol/L 135*   POTASSIUM mmol/L 4.3   CHLORIDE mmol/L 97*   CO2 mmol/L 21.7*   BUN mg/dL 11   CREATININE mg/dL 0.96   CALCIUM mg/dL 9.7   GLUCOSE mg/dL 160*   ALBUMIN g/dL 4.2   BILIRUBIN mg/dL 1.1   ALK PHOS U/L 120*   AST (SGOT) U/L 20   ALT (SGPT) U/L 14   Estimated Creatinine Clearance: 46.7 mL/min (by C-G formula based on SCr of 0.96 mg/dL).  No results found for: \"AMMONIA\"          Lab Results   Component Value Date    HGBA1C 5.50 08/31/2017     Lab Results   Component Value Date    TSH 2.120 05/04/2019     No results found for: \"PREGTESTUR\", \"PREGSERUM\", \"HCG\", \"HCGQUANT\"  Pain Management Panel           No data to display                "   ---------------------------------------------------------------------------------------------------------------------  Imaging Results (Last 7 Days)       Procedure Component Value Units Date/Time    CT Lower Extremity Right Without Contrast [[id number]] Collected: 05/02/25 2004     Updated: 05/02/25 2007    Narrative:      REASON FOR EXAMINATION: Fall trauma pain.     COMPARISON: None available.     TECHNIQUE: CT of the right knee is performed on a multi-detector CT.  Coronal and sagittal reconstructions were obtained. CT Scan performed  according to as low as reasonably achievable dose protocol.     FINDINGS:  There is an impacted fracture of the lateral right tibial plateau.  Loss  of height is 16 mm image 55 of series 5.  Fracture line also extends at  least to the lateral margin of the the tibial spines image 53 of series  5.  There is osteoarthritis with joint space loss medial femorotibial  joint space as well as chondrocalcinosis.  No fracture of the medial  tibial plateau is seen.  The fibula appears preserved.  Degenerative  change patellofemoral joint particularly laterally.       Impression:      Lateral tibial plateau fracture with depression as described.     This report was finalized on 5/2/2025 8:05 PM by YANNA UMANZOR MD.       XR Femur 2 View Right [[id number]] Collected: 05/02/25 1748     Updated: 05/02/25 1807    Narrative:      Exam: XR FEMUR 2 VW RIGHT-     History: Injury     Comparison: No images available     Findings:     No acute fracture or dislocation.     Soft tissue are normal.       Impression:      Impression:     No acute bony process of the femur.     This report was finalized on 5/2/2025 5:49 PM by Paco Soliman MD.       XR Tibia Fibula 2 View Right [[id number]] Collected: 05/02/25 1749     Updated: 05/02/25 1806    Narrative:      Exam: XR TIBIA FIBULA 2 VW RIGHT-     History: injury     Comparison: No images available     Findings:     Comminuted depressed lateral tibial  plateau fracture. Question fibula  shaft fracture.      No other acute fracture or dislocation.     Soft tissue are normal.       Impression:      Impression:     Tibial plateau fracture with possible fibula shaft fracture.      This report was finalized on 5/2/2025 5:50 PM by Paco Soliman MD.       XR Ankle 3+ View Right [[id number]] Collected: 05/02/25 1750     Updated: 05/02/25 1805    Narrative:      Exam: XR ANKLE 3+ VW RIGHT-     History: injury     Comparison: No images available     Findings:     No acute fracture or dislocation.     Soft tissue are normal.       Impression:      Impression:     No acute bony process.     This report was finalized on 5/2/2025 5:50 PM by Paco Soliman MD.       XR Shoulder 2+ View Right [[id number]] Collected: 05/02/25 1746     Updated: 05/02/25 1749    Narrative:      Exam: XR SHOULDER 2+ VW RIGHT-     History: Injury     Comparison: No images available     Findings:     Severe subacromial space narrowing with chronic humeral neck deformity.      No acute fracture or dislocation.     Soft tissue are normal.       Impression:      Impression:     No acute bony process.     This report was finalized on 5/2/2025 5:47 PM by Paco Soliman MD.       XR Chest 1 View [[id number]] Collected: 05/02/25 1744     Updated: 05/02/25 1749    Narrative:      Procedure: Frontal view of chest obtained.     Comparison: 5/14/2019     History: weakness     Findings:     Bilateral lung reticulation.   Normal heart size and mediastinal contours.  Trachea is in midline position.  No edema or effusion is seen.  There is no evidence of pneumothorax.       Impression:         Bilateral lung reticulation which is new. Atypical infection versus  fibrosis.      This report was finalized on 5/2/2025 5:46 PM by Paco Soliman MD.             Last echocardiogram: No previous echo on file.     I have personally reviewed the above radiology images and read the final radiology report on  05/03/25  ---------------------------------------------------------------------------------------------------------------------  Assessment / Plan     Active Hospital Problems    Diagnosis  POA    **Tibial plateau fracture, right [S82.916P]  Yes       ASSESSMENT/PLAN:  #Acute, lateral tibial plateau fracture status post mechanical fall prior to arrival   #Mild leukocytosis (POA), likely reactive to fracture  #Anion gap metabolic acidosis (POA)  --Radiological images reviewed.   --Orthopedic surgery consulted, input/assistance is much appreciated.   --PRN IV/PO pain medication available with holding parameters to prevent hypotension, oversedation, and/or respiratory depression.   --Scheduled high-dose Tylenol.  --Neurovascular checks Q 4 hours.   --Fall precautions.   --PT/OT consults placed.   --Case management consulted for assistance with discharge plans. Greatly appreciate their assistance.   --LR infusion at 75 mL an hour x 10 hours per attending.  --Repeat labs in the a.m. (CBC and CMP).    #Atrial fibrillation; EKG obtained on arrival revealed A-fib with controlled rate.  Potassium 4.3.  Obtain magnesium.  Replace electrolytes per protocol as necessary.  Check TSH.  Continuous cardiac monitoring ordered.  Review home rate/rhythm controlling agents once reconciled by pharmacy with plans to continue.  Holding parameters to prevent hypotension and/or bradycardia.  #Chronic anticoagulation with Eliquis; hold Eliquis for now pending formal evaluation by orthopedic surgery.  #Hypertension; BP appears overall stable throughout ED course.  Review home antihypertensive regimen once reconciled by pharmacy with plans to continue.  Holding parameters to prevent hypotension and/or bradycardia.  #History of TIA; no acute focal neurological deficits appreciated on exam.  Patient denies headache or dizziness.  #Depression and anxiety; supportive care.  #History of paraesophageal hernia  #History of dysphagia; SLP consult  placed for diet recommendations.  Maintain aspiration precautions.  #GERD; reflux precautions.  PPI.  #Arthritis, scoliosis, and chronic back pain; supportive care.      ----------  -DVT prophylaxis: SCD, Left Leg Only.  Holding Eliquis for now pending formal evaluation by orthopedic surgery.  Last dose of Eliquis reportedly on the morning of 5/2/2025.  -Activity: Bedrest for now. Pending Orthopedic Surgery evaluation.   -Expected length of stay:   OBSERVATION status; however, if further evaluation or treatment plans warrant, status may change.  Based upon current information, I predict patient's care encounter to be less than or equal to 2 midnights   -Disposition will likely require SNF placement. Case management consulted for assistance with discharge plans (as noted above).     High risk secondary to acute lateral tibial plateau fracture status post mechanical fall PTA, mild leukocytosis, and anion gap metabolic acidosis.     CODE STATUS: FULL CODE      Nida CleaningDAVID   05/03/25  01:21 EDT  Pager #[phone]  ---------------------------------------------------------------------------------------------------------------------        Electronically signed by Pia Wren DO at 05/03/25 0518       Current Facility-Administered Medications   Medication Dose Route Frequency Provider Last Rate Last Admin    acetaminophen (TYLENOL) tablet 1,000 mg  1,000 mg Oral Q8H Julio CesarBon garcia A, DO   1,000 mg at 05/08/25 0028    sennosides-docusate (PERICOLACE) 8.6-50 MG per tablet 2 tablet  2 tablet Oral BID Julio CesarBon garcia A, DO   2 tablet at 05/07/25 2410    And    polyethylene glycol (MIRALAX) packet 17 g  17 g Oral Daily PRN Julio CesarBon garcia A DO        And    bisacodyl (DULCOLAX) EC tablet 5 mg  5 mg Oral Daily PRN Aracely Harrellent A, DO        And    bisacodyl (DULCOLAX) suppository 10 mg  10 mg Rectal Daily PRN Julio Cesar, Aracelyent A, DO        ceFAZolin 2000 mg IVPB in 100 mL NS (VTB)  2,000 mg Intravenous  Q8H Bon Harrell DO   2,000 mg at 05/08/25 0029    dextrose (D50W) (25 g/50 mL) IV injection 25 g  25 g Intravenous Q15 Min PRN Bon Harrell DO        dextrose (GLUTOSE) oral gel 15 g  15 g Oral Q15 Min PRN Bon Harrell, DO        diazePAM (VALIUM) tablet 5 mg  5 mg Oral Nightly Bon Harrell DO   5 mg at 05/07/25 2239    enoxaparin sodium (LOVENOX) syringe 30 mg  30 mg Subcutaneous Q12H Bon Harrell DO   30 mg at 05/08/25 0028    furosemide (LASIX) tablet 20 mg  20 mg Oral Daily Bon Harrell DO   20 mg at 05/07/25 0805    glucagon HCl (Diagnostic) injection 1 mg  1 mg Subcutaneous Q15 Min PRN Bon Harrell DO        Magnesium Standard Dose Replacement - Follow Nurse / BPA Driven Protocol   Not Applicable PRN Bon Harrell DO        methocarbamol (ROBAXIN) tablet 500 mg  500 mg Oral Q8H PRN Bon Harrell DO   500 mg at 05/07/25 0805    morphine injection 1 mg  1 mg Intravenous Q3H PRN Bon Harrell DO   1 mg at 05/07/25 1504    nitroglycerin (NITROSTAT) SL tablet 0.4 mg  0.4 mg Sublingual Q5 Min PRN Bon Harrell DO        oxyCODONE (ROXICODONE) immediate release tablet 5 mg  5 mg Oral Q8H PRN Bon Harrell DO   5 mg at 05/07/25 0805    pantoprazole (PROTONIX) EC tablet 40 mg  40 mg Oral Q AM Bon Harrell, DO   40 mg at 05/08/25 0618    Potassium Replacement - Follow Nurse / BPA Driven Protocol   Not Applicable PRN Bon Harrell,         sodium chloride 0.9 % flush 10 mL  10 mL Intravenous Q12H Bon Harrell, DO   10 mL at 05/07/25 2239    sodium chloride 0.9 % flush 10 mL  10 mL Intravenous PRN Bon Harrell,         sodium chloride 0.9 % infusion 40 mL  40 mL Intravenous PRN Bon Harrell DO   40 mL at 05/05/25 0825    sotalol (BETAPACE) tablet 80 mg  80 mg Oral BID Bon Harrell DO   80 mg at 05/07/25 2239     Orders (active)        Start     Ordered    05/07/25 1800  Extravasation Standing Orders -  Elevate Affected Extremity for 24-48 Hours  Continuous         05/07/25 1800    05/07/25 1800  Notify Provider Prior to Administration of Antidote - Extravasation Standing Orders  Continuous        Comments: Open Order Report to View Parameters Requiring Provider Notification    05/07/25 1800    05/07/25 1800  Notify Provider for Tissue Sloughing, Necrosis or Blistering at Extravasation Site  Continuous        Comments: Open Order Report to View Parameters Requiring Provider Notification    05/07/25 1800    05/07/25 0000  ceFAZolin 2000 mg IVPB in 100 mL NS (VTB)  Every 8 Hours         05/06/25 1926 05/07/25 0000  enoxaparin sodium (LOVENOX) syringe 30 mg  Every 12 Hours         05/06/25 1926 05/06/25 2243  Diet: Liquid; Clear Liquid; Fluid Consistency: Thin (IDDSI 0)  Diet Effective Now         05/06/25 2242 05/06/25 1927  PT Consult: Eval & Treat Functional Mobility Below Baseline  Once        Comments: NWB    05/06/25 1926 05/06/25 1927  Advance Diet As Tolerated -  Until Discontinued         05/06/25 1926 05/06/25 1704  Oxygen Therapy- Nasal Cannula; Titrate 1-6 LPM Per SpO2; 90 - 95%  Continuous         05/06/25 1703    05/06/25 1704  Continuous Pulse Oximetry  Continuous         05/06/25 1703    05/06/25 1704  Vital signs every 5 minutes for 15 minutes, every 15 minutes thereafter.  Once         05/06/25 1703 05/06/25 1704  Call Anesthesiologist for additional IV Fluid bolus for Hypotension/Tachycardia  Until Discontinued         05/06/25 1703    05/06/25 1704  Notify Anesthesia of Any Acute Changes in Patient Condition  Continuous        Comments: Open Order Report to View Parameters Requiring Provider Notification    05/06/25 1703    05/06/25 1704  Notify Anesthesia for Unrelieved Pain  Continuous        Comments: Open Order Report to View Parameters Requiring Provider Notification    05/06/25 1703    05/06/25 1704  Once DC criteria to floor met, follow surgeon's orders.  Until Discontinued          05/06/25 1703    05/06/25 1704  Discharge patient from PACU when discharge criteria is met.  Until Discontinued         05/06/25 1703    05/06/25 1509  Obtain Informed Consent  Once         05/06/25 1508    05/06/25 1505  POC Glucose Once  Once        Comments: For all diabetic patients and all patients who are to be admitted. Notify Anesthesiologist for blood sugar > 180.      05/06/25 1504    05/06/25 1505  Saline Lock & Maintain IV Access  Continuous         05/06/25 1504    05/06/25 1505  May take Beta Blocker from home with sip of water.  Once         05/06/25 1504    05/06/25 0045  diazePAM (VALIUM) tablet 5 mg  Nightly         05/05/25 2350    05/05/25 0904  Non Weight Bearing  Every Shift      Comments: Non weight bearing to the right lower extremity.    05/05/25 0903    05/04/25 0600  pantoprazole (PROTONIX) EC tablet 40 mg  Every Early Morning         05/03/25 1129    05/03/25 1215  sotalol (BETAPACE) tablet 80 mg  2 Times Daily         05/03/25 1129    05/03/25 1215  furosemide (LASIX) tablet 20 mg  Daily         05/03/25 1129    05/03/25 0800  Oral Care  2 Times Daily       05/02/25 2335    05/03/25 0400  Neurovascular Checks  Every 4 Hours       05/03/25 0153    05/03/25 0153  Reflux Precautions  Continuous         05/03/25 0152    05/03/25 0152  Potassium Replacement - Follow Nurse / BPA Driven Protocol  As Needed         05/03/25 0152    05/03/25 0152  Magnesium Standard Dose Replacement - Follow Nurse / BPA Driven Protocol  As Needed         05/03/25 0152    05/03/25 0151  oxyCODONE (ROXICODONE) immediate release tablet 5 mg  Every 8 Hours PRN         05/03/25 0151    05/03/25 0150  Aspiration Precautions  Continuous         05/03/25 0149    05/03/25 0150  Fall Precautions  Continuous         05/03/25 0149    05/03/25 0150  Maintain Sequential Compression Device  Continuous        Comments: Left leg only.    05/03/25 0149    05/03/25 0030  sodium chloride 0.9 % flush 10 mL  Every 12 Hours  "Scheduled         05/02/25 2335 05/03/25 0030  sennosides-docusate (PERICOLACE) 8.6-50 MG per tablet 2 tablet  2 Times Daily        Placed in \"And\" Linked Group    05/02/25 2335 05/03/25 0030  acetaminophen (TYLENOL) tablet 1,000 mg  Every 8 Hours         05/02/25 2335 05/03/25 0000  Vital Signs  Every 4 Hours      Comments: Per per hospital policy    05/02/25 2335 05/02/25 2336  Notify Physician (with Parameters)  Until Discontinued         05/02/25 2335 05/02/25 2336  Intake & Output  Every Shift       05/02/25 2335 05/02/25 2336  Weigh patient  Once         05/02/25 2335 05/02/25 2336  Continuous Cardiac Monitoring  Continuous        Comments: Follow Standing Orders As Outlined in Process Instructions (Open Order Report to View Full Instructions)    05/02/25 2335 05/02/25 2336  Telemetry - Place Orders & Notify Provider of Results When Patient Experiences Acute Chest Pain, Dysrhythmia or Respiratory Distress  Continuous        Comments: Open Order Report to View Parameters Requiring Provider Notification    05/02/25 2335 05/02/25 2336  May Be Off Telemetry for Tests  Continuous         05/02/25 2335 05/02/25 2336  OT Consult: Eval & Treat Other; right lateral tibial plateau fracture  Once         05/02/25 2335 05/02/25 2335  polyethylene glycol (MIRALAX) packet 17 g  Daily PRN        Placed in \"And\" Linked Group    05/02/25 2335 05/02/25 2335  bisacodyl (DULCOLAX) EC tablet 5 mg  Daily PRN        Placed in \"And\" Linked Group    05/02/25 2335 05/02/25 2335  bisacodyl (DULCOLAX) suppository 10 mg  Daily PRN        Placed in \"And\" Linked Group    05/02/25 2335 05/02/25 2335  dextrose (GLUTOSE) oral gel 15 g  Every 15 Minutes PRN         05/02/25 2335 05/02/25 2335  dextrose (D50W) (25 g/50 mL) IV injection 25 g  Every 15 Minutes PRN         05/02/25 2335 05/02/25 2335  glucagon HCl (Diagnostic) injection 1 mg  Every 15 Minutes PRN         05/02/25 2335    05/02/25 2335 "  sodium chloride 0.9 % flush 10 mL  As Needed         05/02/25 2335    05/02/25 2335  sodium chloride 0.9 % infusion 40 mL  As Needed         05/02/25 2335    05/02/25 2335  nitroglycerin (NITROSTAT) SL tablet 0.4 mg  Every 5 Minutes PRN         05/02/25 2335    05/02/25 2335  morphine injection 1 mg  Every 3 Hours PRN         05/02/25 2335    05/02/25 2335  methocarbamol (ROBAXIN) tablet 500 mg  Every 8 Hours PRN         05/02/25 2335    05/02/25 2236  Code Status and Medical Interventions: CPR (Attempt to Resuscitate); Full Support  Continuous         05/02/25 2241 05/02/25 2231  Inpatient Hospitalist Consult  STAT        Specialty:  Hospitalist  Provider:  (Not yet assigned)    05/02/25 2231    Unscheduled  Up With Assistance  As Needed       05/02/25 2335    Unscheduled  POC Glucose Finger PRN  As Needed      Comments: Complete no more than 45 minutes prior to patient eating      05/02/25 2335    Unscheduled  Follow Hypoglycemia Standing Orders For Blood Glucose <70 & Notify Provider of Treatment  As Needed      Comments: Follow Hypoglycemia Orders As Outlined in Process Instructions (Open Order Report to View Full Instructions)  Notify Provider Any Time Hypoglycemia Treatment is Administered    05/02/25 2335    Unscheduled  Extravasation Standing Orders - Encourage Active Range of Motion After 48 Hours  As Needed       05/07/25 1800                     Operative/Procedure Notes (all)        Bon Harrell, DO at 05/06/25 1807  Version 1 of 1         Johanna Street  [id number]   1945   Date of Procedure: 05/06/25     Preoperative Diagnosis: Right Split-Depressed Lateral Tibial Plateau Fracture    Postoperative Diagnosis: Right Split- Depressed Lateral Tibial Plateau Fracture    Procedure:   1) Right Tibial Plateau Open Reduction and interal fixation          unicondylar (CPT 24352)  2) Open Lateral Meniscus Repair (CPT 47088)      Indications for Surgery: Johanna Street is a very pleasant 79 year old  female who sustained a fall on the right lower extremity. Xrays demonstrated a significantly depressed lateral tibial plateau fracture with comminution. The patient was admitted to [facility] service. It was discussed that the fracture will require open reduction and internal fixation. Eliquis was held preoperatively. Risks and benefits of the procedure was discussed with the patient in detail which includes but not limited to infection, wound healing complications, hardware complications, need for hardware removal, nonunion, malunion, osteoarthritis, knee instability, continued pain, complex regional pain syndrome, loss of function to the right lower extremity, vascular injury, nerve injury, foot drop, and also anesthetic risks such as stroke, PE, DVT, ileus, and even death. The patient understands these risk and consent was obtained for right tibial plateau open reduction and internal fixation.    Surgeon(s): Bon Schwartz DO     Anesthesia: General    EBL: 100ml    Preoperative Antibiotic: 2 g of ancef was administered by the anesthesia staff prior to incision     Complications: None     Disposition: Stable to the recovery room    Operative Procedure: The patient was taken to the operating room and placed supine on the operating room table. A timeout was performed to verify the appropriate location, patient name, and intended procedure. 2 g of ancef was administered by the anesthesia staff for preoperative prophylactic antibiotic therapy. All bony prominences were well-padded. Anesthesia used an LMA for airway management during this procedure.  A curvilinear incision was carried out anterolaterally along the proximal tibia. Electrocautery ensued with dissection down to the tibialis anterior fascia. The fascia was incised in line with its fibers with a small cuff left medially to allow for later repair. A soni elevator was utilized to elevate the tibialis anterior muscle and  subperiosteal expose the proximal anterolateral tibial shaft and metaphysis.  Electrocautery was utilized to expose gerdys tubercle and elevate the attachment of the IT band anteriorly and posteriorly. The IT band was then slightly split proximally to the lateral knee joint. A The lateral meniscus was torn from the capsular attachment to the lateral tibial plateau as the proximal fragment was gapped. This was tagged with #vicryl to allow for lateral meniscus repair. The main lateral cortex fragment of the metaphysis was booked open. A soni elevator was utilized to tamp up the articular surface which was comminuted and significantly depressed. A wire was used to hold the articular surface elevated and sunk into the metaphysis. The fracture was copiously irrigated. AP and lateral fluoroscopic views demonstrated good elevation and reduction of the articular surface. The void was then filled with cancellous chips and was tamped superiorly to give support to the articular surface. A synthes anterolateral proximal tibial locking plate was then placed and the main lateral cortex fragment was closed bocka down and held in placed just below the articular surface with multiple k-wires. A bicortical nonlocking screw was placed in the shaft to suck the plate down to the bone. A periarticular clamp was then placed anterolaterally on the plate and the medial femoral condyle to compress the gapping of the tibial plateau. A bicortical cancellous nonlocking screws was placed to compress the fracture. Multiple locking screws were then placed just under the articular surface for rafting. Multiple locking and nonlocking screws were placed bicortically distally in the shaft and metaphysis for distal fixation. AP and lateral fluoroscopic views demonstrated anatomic reduction of the fracture and the hardware was in good position. The lateral meniscus was then brought inferiorly and repaired to the proximal aspect of the plate. 1 gram of  vancomycin powder was applied throughout the surgical bed. The IT band was closed with #1 stratafix. The wound was then closed with #1 vicyl followed by 2-0 monocyrl and a a combination of skin staples and 2-0 nylon. A sterile dressing and ace bandage was then placed followed by a knee immobilizer. The patient with remain non-weight bearing to the right lower extremity and begin DVT prophylaxis on POD #1.       Bon Schwartz DO  05/06/25  18:07 EDT         Electronically signed by Bon Schwartz DO at 05/06/25 1828          Physician Progress Notes (most recent note)        Maurilio Patel DO at 05/07/25 1249                [facility]    Subjective     History:   Johanna Street is a 79 y.o. female admitted on 5/2/2025 secondary to Tibial plateau fracture, right     Procedures:   5/6/25: Right tibial plateau ORIF and open lateral meniscus repair     CC: Follow up right tibial plateau fracture     Patient seen and examined.  Awake and alert. Some RLE pain and soreness reported. No reported CP. No reported vomiting. No acute events reported.     History taken from: patient, chart, and RN.      Objective     Vital Signs  Temp:  [96.9 °F (36.1 °C)-98.6 °F (37 °C)] 98.6 °F (37 °C)  Heart Rate:  [] 110  Resp:  [12-20] 18  BP: ()/() 90/44    Intake/Output Summary (Last 24 hours) at 5/7/2025 1249  Last data filed at 5/6/2025 1521  Gross per 24 hour   Intake 100 ml   Output 600 ml   Net -500 ml         Physical Exam:   General:    Awake, alert, in no acute distress   Heart:      Normal S1 and S2. Irregularly irregular.    Lungs:     Respirations regular, even and unlabored. Lungs clear to auscultation B/L. No wheezes, rales or rhonchi.   Abdomen:   Soft and nontender. No guarding, rebound tenderness or  organomegaly noted. Bowel sounds present x 4.   Extremities:  No clubbing, cyanosis or  LLE edema noted. (+) knee immobilizer and surgical dressing RLE.       Results Review:    Results from last 7 days   Lab Units 05/07/25  0118 05/05/25  0015 05/03/25  0107 05/02/25  1633   WBC 10*3/mm3 12.19* 9.71 13.41* 11.72*   HEMOGLOBIN g/dL 11.3* 10.3* 12.1 13.7   PLATELETS 10*3/mm3 309 202 225 286     Results from last 7 days   Lab Units 05/07/25  0118 05/05/25  1038 05/05/25  0015 05/03/25  0107 05/02/25  1633   SODIUM mmol/L 134*  --  138 131* 135*   POTASSIUM mmol/L 5.2 4.3 3.6 5.0 4.3   CHLORIDE mmol/L 96*  --  106 96* 97*   CO2 mmol/L 25.6  --  20.6* 21.0* 21.7*   BUN mg/dL 9  --  8 11 11   CREATININE mg/dL 0.79  --  0.68 0.89 0.96   CALCIUM mg/dL 8.9  --  8.1* 9.1 9.7   GLUCOSE mg/dL 127*  --  144* 138* 160*     Results from last 7 days   Lab Units 05/07/25  0118 05/03/25  0107 05/02/25  1633   BILIRUBIN mg/dL 1.0 1.4* 1.1   ALK PHOS U/L 135* 109 120*   AST (SGOT) U/L 45* 33* 20   ALT (SGPT) U/L 23 16 14     Results from last 7 days   Lab Units 05/02/25  1633   MAGNESIUM mg/dL 2.0               Imaging Results (Last 24 Hours)       Procedure Component Value Units Date/Time    FL Surgery Fluoro [[id number]] Collected: 05/07/25 0759     Updated: 05/07/25 0802    Narrative:      EXAMINATION: FL SURGERY FLUORO-      CLINICAL INDICATION: tibfid; S82.141A-Displaced bicondylar fracture of  right tibia, initial encounter for closed fracture        COMPARISON: None available     Total fluoroscopy time 210.3 seconds     Fluoroscopy was provided and 8 images were acquired during surgery     For a full procedural report, please see the report provided by the  performing physician        This report was finalized on 5/7/2025 8:00 AM by Dr. Lane Boothe MD.                 Medications:  acetaminophen, 1,000 mg, Oral, Q8H  ceFAZolin, 2,000 mg, Intravenous, Q8H  diazePAM, 5 mg, Oral, Nightly  enoxaparin sodium, 30 mg, Subcutaneous, Q12H  furosemide, 20 mg, Oral, Daily  pantoprazole, 40 mg, Oral, Q AM  senna-docusate sodium, 2 tablet, Oral, BID  sodium chloride, 10 mL, Intravenous,  Q12H  sotalol, 80 mg, Oral, BID               Assessment & Plan   Right lateral tibial plateau fracture with questionable non-displaced right tibia shaft fracture: S/P right lateral tibial plateau ORIF and lateral meniscus repair. Currently NWB with knee immobilizer. Pain control. Ortho input appreciated.     Chronic Afib: Currently rate controlled. Cont sotalol. Holding Eliquis perioperatively. Restart when okay with ortho.      Essential HTN: BP has fluctuated but overall stable. Cont to monitor.     GERD: Cont PPI.     DVT PPX: Lovenox per ortho recs.    Disposition Likely SNF placement pending post-op course.     Maurilio Patel DO  25  12:49 EDT     Electronically signed by Maurilio Patel DO at 25 1257          Consult Notes (most recent note)        Theresa Iverson APRN at 25 1248        Consult Orders    1. Inpatient Orthopedic Surgery Consult [[id number]] ordered by Pai Wren DO              Attestation signed by Bon Schwartz DO at 25 0738      I have reviewed this documentation and agree.    Plan for right tibial plateau ORIF this coming week while inpatient  Patient will need eliquis held  NWB  Knee immobilizer    Bon Schwartz DO                          Inpatient Orthopedic Surgery Consult  Consult performed by: Theresa Iverson APRN  Consult ordered by: Pia Wren DO          Patient Identification:  Name:  Johanna Street  Age:  79 y.o.  Sex:  female  :  1945  MRN:  [id number]  Visit Number:  [id number]  Primary care provider:  Bi Youssef MD    History of present illness:  Patient is a 78 y/o female with a PMH significant for A-fib anticoagulated with Eliquis, TIA, HTN, GERD, chronic back pain who presented to [facility] ED following a fall. She fell getting into her car after her leg gave out.  X rays and CT revealed a right lateral tibial plateau fracture.  The patient states she has been feeling generally weak  for about the past 4 weeks, since having a severe episode of back pain.  She denies any injury at that time.  She is wearing a knee immobilizer and states that her knee pain is controlled.    ---------------------------------------------------------------------------------------------------------------------    Review of Systems   Constitutional:  Positive for activity change.   Respiratory:  Negative for shortness of breath.    Cardiovascular:  Negative for chest pain.   Musculoskeletal:  Positive for arthralgias, gait problem and neck pain.   Neurological:  Positive for numbness. Negative for weakness.   All other systems reviewed and are negative.     ---------------------------------------------------------------------------------------------------------------------   Past History:  Family History   Problem Relation Age of Onset    Heart attack Mother     Stroke Mother     Coronary artery disease Mother     Diabetes Mother     Hypertension Mother     Heart disease Father     Arthritis Father     Coronary artery disease Father     Liver disease Brother      Past Medical History:   Diagnosis Date    Ankle edema     Anxiety     Arthritis     Atrial fibrillation     Back pain     Depression     Hernia, hiatal     PAF (paroxysmal atrial fibrillation)      Past Surgical History:   Procedure Laterality Date    APPENDECTOMY      CARDIAC CATHETERIZATION  12/2013    CHOLECYSTECTOMY      COLON SURGERY      COLONOSCOPY  2006    ENDOSCOPY N/A 12/7/2017    Procedure: ESOPHAGOGASTRODUODENOSCOPY WITH ANESTHESIA;  Surgeon: Eliseo Moon MD;  Location: [facility] OR;  Service:     ENDOSCOPY N/A 2/21/2018    Procedure: ESOPHAGOGASTRODUODENOSCOPY WITH DILATATION CPT CODE: 89023;  Surgeon: Tomi Dey III, MD;  Location: [facility] OR;  Service:     EYE SURGERY      HYSTERECTOMY      NERVE REPAIR      TONSILLECTOMY       Social History     Socioeconomic History    Marital status:    Tobacco Use    Smoking status: Never     Smokeless tobacco: Never   Vaping Use    Vaping status: Never Used   Substance and Sexual Activity    Alcohol use: No    Drug use: No    Sexual activity: Defer     ---------------------------------------------------------------------------------------------------------------------   Allergies:  Codeine, Diltiazem, Zinc, Levaquin [levofloxacin], and Penicillins  ---------------------------------------------------------------------------------------------------------------------   Prior to Admission Medications       Prescriptions Last Dose Informant Patient Reported? Taking?    apixaban (ELIQUIS) 5 MG tablet tablet 5/2/2025 Self, Family Member No Yes    Take 1 tablet by mouth Every 12 (Twelve) Hours.    diazepam (VALIUM) 5 MG tablet 5/2/2025 Self, Family Member Yes Yes    Take 1 tablet by mouth Daily.    furosemide (LASIX) 20 MG tablet 5/2/2025 Self, Family Member Yes Yes    Take 1 tablet by mouth Daily.    lansoprazole (PREVACID) 15 MG capsule 5/2/2025 Self, Family Member Yes Yes    Take 1 capsule by mouth Daily.    sotalol (BETAPACE) 80 MG tablet 5/2/2025 Self, Family Member Yes Yes    Take 1 tablet by mouth 2 (Two) Times a Day.    divalproex (DEPAKOTE) 125 MG DR tablet Unknown Self, Family Member Yes No    Take 1 tablet by mouth Daily As Needed (Headache).          Hospital Meds:  acetaminophen, 1,000 mg, Oral, Q8H  diazePAM, 5 mg, Oral, Daily  furosemide, 20 mg, Oral, Daily  [START ON 5/4/2025] pantoprazole, 40 mg, Oral, Q AM  senna-docusate sodium, 2 tablet, Oral, BID  sodium chloride, 10 mL, Intravenous, Q12H  sotalol, 80 mg, Oral, BID      sodium chloride, 100 mL/hr, Last Rate: 100 mL/hr (05/03/25 1200)      ---------------------------------------------------------------------------------------------------------------------   Vital Signs:  Temp:  [97.6 °F (36.4 °C)-98.2 °F (36.8 °C)] 98 °F (36.7 °C)  Heart Rate:  [] 94  Resp:  [16-18] 16  BP: ()/(59-97) 142/69      05/02/25  1545 05/03/25  0002    Weight: 78.5 kg (173 lb) 81.2 kg (179 lb 0.2 oz)     Body mass index is 33.82 kg/m².  ---------------------------------------------------------------------------------------------------------------------     Physical exam:  Constitutional:  Well-developed and well-nourished.  No acute distress.      HENT:  Head: Normocephalic and atraumatic.  Mouth:  Moist mucous membranes.    Eyes:  Conjunctivae are normal.  Pupils are equal, round, and reactive to light.  No scleral icterus.  Neck:  Neck supple.  Trachea midline.  Cardiovascular:  Normal rate and regular rhythm.  Pulmonary/Chest:  No respiratory distress and good air movement.  Abdominal:  Soft.  No distension and no tenderness.   Musculoskeletal: Right knee: Knee immobilizer.  Mild soft tissue swelling.  Right dorsi and plantarflexion is intact.  +2 DP pulse.           Neurological:  Alert and oriented to person, place, and time.     Skin:  Skin is warm and dry.  No rash noted.  No ecchymosis or abrasion.   Psychiatric:  Normal mood and affect.  Behavior is normal.  Judgment and thought content normal.   Peripheral vascular:  No pitting edema and strong pulses on all 4 extremities.      ---------------------------------------------------------------------------------------------------------------------   .  ---------------------------------------------------------------------------------------------------------------------   Results from last 7 days   Lab Units 05/03/25 0107 05/02/25  1633   WBC 10*3/mm3 13.41* 11.72*   HEMOGLOBIN g/dL 12.1 13.7   HEMATOCRIT % 38.0 44.1   MCV fL 94.8 95.2   MCHC g/dL 31.8 31.1*   PLATELETS 10*3/mm3 225 286         Results from last 7 days   Lab Units 05/03/25 0107 05/02/25  1633   SODIUM mmol/L 131* 135*   POTASSIUM mmol/L 5.0 4.3   MAGNESIUM mg/dL  --  2.0   CHLORIDE mmol/L 96* 97*   CO2 mmol/L 21.0* 21.7*   BUN mg/dL 11 11   CREATININE mg/dL 0.89 0.96   CALCIUM mg/dL 9.1 9.7   GLUCOSE mg/dL 138* 160*   ALBUMIN g/dL 3.5 4.2  "  BILIRUBIN mg/dL 1.4* 1.1   ALK PHOS U/L 109 120*   AST (SGOT) U/L 33* 20   ALT (SGPT) U/L 16 14   Estimated Creatinine Clearance: 50.3 mL/min (by C-G formula based on SCr of 0.89 mg/dL).  No results found for: \"AMMONIA\"          Lab Results   Component Value Date    HGBA1C 5.50 08/31/2017     Lab Results   Component Value Date    TSH 4.760 (H) 05/02/2025     No results found for: \"PREGTESTUR\", \"PREGSERUM\", \"HCG\", \"HCGQUANT\"  Pain Management Panel          Latest Ref Rng & Units 5/3/2025   Pain Management Panel   Amphetamine, Urine Qual Negative Negative    Barbiturates Screen, Urine Negative Negative    Benzodiazepine Screen, Urine Negative Positive    Buprenorphine, Screen, Urine Negative Negative    Cocaine Screen, Urine Negative Negative    Fentanyl, Urine Negative Negative    Methadone Screen , Urine Negative Negative    Methamphetamine, Ur Negative Negative      No results found for: \"BLOODCX\"  No results found for: \"URINECX\"  No results found for: \"WOUNDCX\"  No results found for: \"STOOLCX\"      ---------------------------------------------------------------------------------------------------------------------   Imaging Results (Last 7 Days)       Procedure Component Value Units Date/Time    CT Lower Extremity Right Without Contrast [[id number]] Collected: 05/02/25 2004     Updated: 05/02/25 2007    Narrative:      REASON FOR EXAMINATION: Fall trauma pain.     COMPARISON: None available.     TECHNIQUE: CT of the right knee is performed on a multi-detector CT.  Coronal and sagittal reconstructions were obtained. CT Scan performed  according to as low as reasonably achievable dose protocol.     FINDINGS:  There is an impacted fracture of the lateral right tibial plateau.  Loss  of height is 16 mm image 55 of series 5.  Fracture line also extends at  least to the lateral margin of the the tibial spines image 53 of series  5.  There is osteoarthritis with joint space loss medial femorotibial  joint space as well " as chondrocalcinosis.  No fracture of the medial  tibial plateau is seen.  The fibula appears preserved.  Degenerative  change patellofemoral joint particularly laterally.       Impression:      Lateral tibial plateau fracture with depression as described.     This report was finalized on 5/2/2025 8:05 PM by YANNA UMANZOR MD.       XR Femur 2 View Right [[id number]] Collected: 05/02/25 1748     Updated: 05/02/25 1807    Narrative:      Exam: XR FEMUR 2 VW RIGHT-     History: Injury     Comparison: No images available     Findings:     No acute fracture or dislocation.     Soft tissue are normal.       Impression:      Impression:     No acute bony process of the femur.     This report was finalized on 5/2/2025 5:49 PM by Paco Soliman MD.       XR Tibia Fibula 2 View Right [[id number]] Collected: 05/02/25 1749     Updated: 05/02/25 1806    Narrative:      Exam: XR TIBIA FIBULA 2 VW RIGHT-     History: injury     Comparison: No images available     Findings:     Comminuted depressed lateral tibial plateau fracture. Question fibula  shaft fracture.      No other acute fracture or dislocation.     Soft tissue are normal.       Impression:      Impression:     Tibial plateau fracture with possible fibula shaft fracture.      This report was finalized on 5/2/2025 5:50 PM by Paco Soliman MD.       XR Ankle 3+ View Right [[id number]] Collected: 05/02/25 1750     Updated: 05/02/25 1805    Narrative:      Exam: XR ANKLE 3+ VW RIGHT-     History: injury     Comparison: No images available     Findings:     No acute fracture or dislocation.     Soft tissue are normal.       Impression:      Impression:     No acute bony process.     This report was finalized on 5/2/2025 5:50 PM by Paco Soliman MD.       XR Shoulder 2+ View Right [[id number]] Collected: 05/02/25 1746     Updated: 05/02/25 1749    Narrative:      Exam: XR SHOULDER 2+ VW RIGHT-     History: Injury     Comparison: No images available     Findings:      Severe subacromial space narrowing with chronic humeral neck deformity.      No acute fracture or dislocation.     Soft tissue are normal.       Impression:      Impression:     No acute bony process.     This report was finalized on 2025 5:47 PM by Paco Soliman MD.       XR Chest 1 View [[id number]] Collected: 25     Updated: 25    Narrative:      Procedure: Frontal view of chest obtained.     Comparison: 2019     History: weakness     Findings:     Bilateral lung reticulation.   Normal heart size and mediastinal contours.  Trachea is in midline position.  No edema or effusion is seen.  There is no evidence of pneumothorax.       Impression:         Bilateral lung reticulation which is new. Atypical infection versus  fibrosis.      This report was finalized on 2025 5:46 PM by Paco Soliman MD.             ----------------------------------------------------------------------------------------------------------------------      Assessment: Right lateral tibial plateau fracture       Plan:     Imaging was reviewed with Dr. Schwartz.  CT scan has been completed.    Patient is recommended for outpatient follow-up for scheduling of surgery, however, she states that she lives alone and will not be able to manage by herself.    Continue the knee immobilizer.  Remain nonweightbearing to the right lower extremity at all times.    Pain control as needed.    Orthopedic surgery will continue to follow.    Thank you for the consult.    Theresa Iverson, DAVID  25  12:48 EDT    Electronically signed by Bon Schwartz DO at 25 4552       Nutrition Notes (most recent note)    No notes exist for this encounter.          Physical Therapy Notes (most recent note)        Erika Villalpando, PT at 25 1228  Version 1 of 1         Acute Care - Physical Therapy Initial Evaluation  VERONICA Denson     Patient Name: Johanna Street  : 1945  MRN: [id number]  Today's Date:  5/7/2025   Onset of Illness/Injury or Date of Surgery: 05/02/25  Visit Dx:     ICD-10-CM ICD-9-CM   1. Closed fracture of right tibial plateau, initial encounter  S82.141A 823.00     Patient Active Problem List   Diagnosis    PAF (paroxysmal atrial fibrillation) 12 2013. [id number] score is 2.    Ankle edema    Depression    Anxiety    Paraesophageal hernia    Gastroesophageal reflux disease    Dysphagia    Abnormal EKG, left anterior fascicular block    Chronic anticoagulation    Tibial plateau fracture, right     Past Medical History:   Diagnosis Date    Ankle edema     Anxiety     Arthritis     Atrial fibrillation     Back pain     Depression     Hernia, hiatal     PAF (paroxysmal atrial fibrillation)      Past Surgical History:   Procedure Laterality Date    APPENDECTOMY      CARDIAC CATHETERIZATION  12/2013    CHOLECYSTECTOMY      COLON SURGERY      COLONOSCOPY  2006    ENDOSCOPY N/A 12/7/2017    Procedure: ESOPHAGOGASTRODUODENOSCOPY WITH ANESTHESIA;  Surgeon: Eliseo Moon MD;  Location: [facility];  Service:     ENDOSCOPY N/A 2/21/2018    Procedure: ESOPHAGOGASTRODUODENOSCOPY WITH DILATATION CPT CODE: 70535;  Surgeon: Tomi Dey III, MD;  Location: [facility];  Service:     EYE SURGERY      HYSTERECTOMY      NERVE REPAIR      TIBIAL PLATEAU OPEN REDUCTION INTERNAL FIXATION Right 5/6/2025    Procedure: TIBIAL PLATEAU OPEN REDUCTION INTERNAL FIXATION;  Surgeon: Bon Harrell DO;  Location: [facility];  Service: Orthopedics;  Laterality: Right;    TONSILLECTOMY       PT Assessment (Last 12 Hours)       PT Evaluation and Treatment       Row Name 05/07/25 1418          Physical Therapy Time and Intention    Subjective Information complains of;pain;weakness  -AG     Document Type evaluation  -AG     Mode of Treatment physical therapy  -AG     Patient Effort adequate  -AG     Symptoms Noted During/After Treatment dizziness  -AG       Row Name 05/07/25 1418          General Information    Patient  Profile Reviewed yes  -AG     Onset of Illness/Injury or Date of Surgery 05/02/25  -AG     Referring Physician Dr. Harrell  -AG     Patient Observations alert;cooperative;agree to therapy  -AG     Patient/Family/Caregiver Comments/Observations pt. supine in bed on room air; R knee immobilizer and cryocuff in place.  Granddaughter at bedside.  Pt. requires encouragement for participation.  -AG     Prior Level of Function --  pt. is poor historian with regards to PLOF; decr insight into current deficits.  -AG     Equipment Currently Used at Home walker, rolling  -AG     Pertinent History of Current Functional Problem pt. sustained fall resulting in R tibial plateau fx; underwent ORIF with orders for strict NWB.  -AG     Existing Precautions/Restrictions fall;non-weight bearing  -AG     Limitations/Impairments safety/cognitive  -AG     Equipment Issued to Patient gait belt  -AG     Risks Reviewed patient and family:;increased discomfort;dizziness  -AG     Benefits Reviewed patient and family:;improve function;increase independence;increase strength;increase balance;increase knowledge;decrease risk of DVT  -AG     Barriers to Rehab previous functional deficit;cognitive status  -AG       Row Name 05/07/25 1418          Previous Level of Function/Home Environm    Household Ambulation, Previous Functional Level uses device or equipment  -AG     Community Ambulation, Previous Functional Level uses device or equipment  -AG       Row Name 05/07/25 1418          Living Environment    Current Living Arrangements home  -AG     Home Accessibility --  has one small threshold to enter ground floor apartment  -AG     People in Home alone  -AG     Primary Care Provided by self  -AG       Row Name 05/07/25 1418          Home Use of Assistive/Adaptive Equipment    Equipment Currently Used at Home walker, rolling  -AG       Row Name 05/07/25 1418          Pain    Pain Location extremity  -AG     Pain Side/Orientation right;lower  -AG        Row Name 05/07/25 1418          Cognition    Affect/Mental Status (Cognition) --  irritable  -AG     Orientation Status (Cognition) oriented to;person;place  decr insight into situation; also decr orientation to time, believes she just came out of surgery today.  -AG     Follows Commands (Cognition) verbal cues/prompting required;repetition of directions required  -AG     Personal Safety Interventions fall prevention program maintained;gait belt;nonskid shoes/slippers when out of bed;supervised activity  -AG       Row Name 05/07/25 1418          Range of Motion (ROM)    Range of Motion --  L LE AAROM WFL; limited B shoulder ROM; R ankle AROM WFL  -       Row Name 05/07/25 1418          Strength (Manual Muscle Testing)    Strength (Manual Muscle Testing) --  R ankle DF/ PF WFL  -       Row Name 05/07/25 1418          Mobility    Extremity Weight-bearing Status right lower extremity  -AG     Right Lower Extremity (Weight-bearing Status) non weight-bearing (NWB)  -       Row Name 05/07/25 1418          Bed Mobility    Bed Mobility rolling left;rolling right;scooting/bridging;supine-sit;sit-supine  -AG     Rolling Left [place] (Bed Mobility) verbal cues;nonverbal cues (demo/gesture);maximum assist (25% patient effort)  -AG     Rolling Right [place] (Bed Mobility) verbal cues;nonverbal cues (demo/gesture);maximum assist (25% patient effort)  -AG     Scooting/Bridging [place] (Bed Mobility) minimum assist (75% patient effort)  -AG     Supine-Sit [place] (Bed Mobility) verbal cues;nonverbal cues (demo/gesture);maximum assist (25% patient effort)  -AG     Sit-Supine [place] (Bed Mobility) verbal cues;nonverbal cues (demo/gesture);maximum assist (25% patient effort);2 person assist  -AG     Bed Mobility, Safety Issues decreased use of legs for bridging/pushing;decreased use of arms for pushing/pulling;cognitive deficits limit understanding  -AG     Assistive Device (Bed Mobility) bed  rails;repositioning sheet  -       Row Name 05/07/25 1418          Transfers    Transfers sit-stand transfer;stand-sit transfer  -AG     Maintains Weight-bearing Status (Transfers) able to maintain  -       Row Name 05/07/25 1418          Sit-Stand Transfer    Sit-Stand [place] (Transfers) verbal cues;nonverbal cues (demo/gesture);moderate assist (50% patient effort);2 person assist;maximum assist (25% patient effort)  -     Assistive Device (Sit-Stand Transfers) walker, front-wheeled  -       Row Name 05/07/25 1418          Stand-Sit Transfer    Stand-Sit [place] (Transfers) verbal cues;nonverbal cues (demo/gesture);moderate assist (50% patient effort);2 person assist  -     Assistive Device (Stand-Sit Transfers) walker, front-wheeled  -       Row Name 05/07/25 1418          Gait/Stairs (Locomotion)    Patient was able to Ambulate no, other medical factors prevent ambulation  -AG     Reason Patient was unable to Ambulate Excessive Weakness  -       Row Name 05/07/25 1418          Safety Issues/Impairments Affecting Functional Mobility    Safety Issues Affecting Function (Mobility) insight into deficits/self-awareness;safety precautions follow-through/compliance;safety precaution awareness  -       Row Name 05/07/25 1418          Balance    Balance Assessment sitting static balance;sitting dynamic balance;sit to stand dynamic balance;standing static balance;standing dynamic balance  -     Static Sitting Balance standby assist  -AG     Position, Sitting Balance unsupported;sitting edge of bed  -     Static Standing Balance verbal cues;non-verbal cues (demo/gesture);2-person assist;moderate assist;maximum assist  -       Row Name 05/07/25 1418          Motor Skills    Therapeutic Exercise hip;knee;ankle  -       Row Name 05/07/25 1418          Ankle (Therapeutic Exercise)    Ankle (Therapeutic Exercise) AROM (active range of motion)  -     Ankle AROM (Therapeutic Exercise)  right;dorsiflexion;plantarflexion;supine  -AG       Row Name             Wound 05/06/25 1609 Right anterior knee Surgical Open Surgical Incision    Wound - Properties Group Placement Date: 05/06/25  -CE Placement Time: 1609  -CE Side: Right  -CE Orientation: anterior  -CE Location: knee  -CE Primary Wound Type: Surgical  -CE Secondary Wound Type - Surgical: Open Surg  -CE    Retired Wound - Properties Group Placement Date: 05/06/25  -CE Placement Time: 1609  -CE Side: Right  -CE Orientation: anterior  -CE Location: knee  -CE    Retired Wound - Properties Group Placement Date: 05/06/25  -CE Placement Time: 1609  -CE Side: Right  -CE Orientation: anterior  -CE Location: knee  -CE    Retired Wound - Properties Group Date first assessed: 05/06/25  -CE Time first assessed: 1609  -CE Side: Right  -CE Location: knee  -CE      Row Name 05/07/25 1418          Coping    Observed Emotional State irritable  -AG     Verbalized Emotional State acceptance  -AG     Trust Relationship/Rapport care explained;choices provided;thoughts/feelings acknowledged  -     Family/Support Persons --  granddaughter  -AG     Involvement in Care at bedside  -AG     Family/Support System Care support provided;self-care encouraged  -AG       Row Name 05/07/25 1418          Plan of Care Review    Plan of Care Reviewed With patient  -AG     Outcome Evaluation PT evaluation complete.  Pt. requires mod/ max A x 2 for functional mobility skills; able to stand w/ RW and maintain R LE NWB x 2 trials.  Pt. able to clear hips from bed but not achieve upright stance.  Discussed importance of maintaining NWB R LE, importance of mobilization.  Pt. will require ongoing skilled PT to achieve optimal safe function.  Will continue to follow.  -AG       Row Name 05/07/25 1418          Positioning and Restraints    Pre-Treatment Position in bed  -AG     Post Treatment Position bed  -AG     In Bed supine;call light within reach;encouraged to call for assist;exit  alarm on;with family/caregiver;with nsg;side rails up x3;RLE elevated  -AG       Row Name 05/07/25 1418          Therapy Assessment/Plan (PT)    Patient/Family Therapy Goals Statement (PT) return home to OF  -AG     Functional Level at Time of Evaluation (PT) mod/ max A x 2  -AG     PT Diagnosis (PT) impaired functional mobility  -AG     Rehab Potential (PT) good  -AG     Criteria for Skilled Interventions Met (PT) yes;meets criteria;skilled treatment is necessary  -AG     Therapy Frequency (PT) 6 times/wk  Monday-Saturday  -AG     Predicted Duration of Therapy Intervention (PT) LOS  -AG     Problem List (PT) problems related to;balance;cognition;mobility;strength;pain;range of motion (ROM)  -AG     Activity Limitations Related to Problem List (PT) unable to ambulate safely;unable to transfer safely;BADLs not performed adequately or safely  -AG       Row Name 05/07/25 1418          Therapy Plan Review/Discharge Plan (PT)    Therapy Plan Review (PT) evaluation/treatment results reviewed;care plan/treatment goals reviewed;risks/benefits reviewed;current/potential barriers reviewed;participants voiced agreement with care plan;participants included;patient  -AG       Row Name 05/07/25 1418          Physical Therapy Goals    Bed Mobility Goal Selection (PT) bed mobility, PT goal 1  -AG     Transfer Goal Selection (PT) transfer, PT goal 1;transfer, PT goal 2  -AG       Row Name 05/07/25 1418          Bed Mobility Goal 1 (PT)    Activity/Assistive Device (Bed Mobility Goal 1, PT) rolling to left;rolling to right;sit to supine;scooting;supine to sit  -AG     [place] Level/Cues Needed (Bed Mobility Goal 1, PT) minimum assist (75% or more patient effort)  -AG     Time Frame (Bed Mobility Goal 1, PT) by discharge  -AG       Row Name 05/07/25 1418          Transfer Goal 1 (PT)    Activity/Assistive Device (Transfer Goal 1, PT) sit-to-stand/stand-to-sit;walker, rolling  -AG     [place] Level/Cues Needed (Transfer  Goal 1, PT) moderate assist (50-74% patient effort)  -AG     Time Frame (Transfer Goal 1, PT) by discharge  -AG       Row Name 05/07/25 1418          Transfer Goal 2 (PT)    Activity/Assistive Device (Transfer Goal 2, PT) bed-to-chair/chair-to-bed;toilet;walker, rolling  -AG     [place] Level/Cues Needed (Transfer Goal 2, PT) moderate assist (50-74% patient effort)  -AG     Time Frame (Transfer Goal 2, PT) by discharge  -AG               User Key  (r) = Recorded By, (t) = Taken By, (c) = Cosigned By      Initials Name Provider Type    AG Erika Villalpando, PT Physical Therapist    Lee Petit RN Registered Nurse                    Physical Therapy Education       Title: PT OT SLP Therapies (Done)       Topic: Physical Therapy (Done)       Point: Mobility training (Done)       Learning Progress Summary            Patient Acceptance, E,D, VU,NR by  at 5/7/2025 1417   Family Acceptance, E,D, VU,NR by  at 5/7/2025 1417                      Point: Home exercise program (Done)       Learning Progress Summary            Patient Acceptance, E,D, VU,NR by AG at 5/7/2025 1417   Family Acceptance, E,D, VU,NR by AG at 5/7/2025 1417                      Point: Body mechanics (Done)       Learning Progress Summary            Patient Acceptance, E,D, VU,NR by AG at 5/7/2025 1417   Family Acceptance, E,D, VU,NR by  at 5/7/2025 1417                      Point: Precautions (Done)       Learning Progress Summary            Patient Acceptance, E,D, VU,NR by  at 5/7/2025 1417   Family Acceptance, E,D, VU,NR by  at 5/7/2025 1417                                      User Key       Initials Effective Dates Name Provider Type Discipline     06/16/21 -  Erika Villalpando, PT Physical Therapist PT                  PT Recommendation and Plan  Anticipated Discharge Disposition (PT): inpatient rehabilitation facility  Planned Therapy Interventions (PT): balance training, bed mobility training, gait training, home exercise  program, transfer training, strengthening, ROM (range of motion), postural re-education, patient/family education  Therapy Frequency (PT): 6 times/wk (Monday-Saturday)  Plan of Care Reviewed With: patient  Outcome Evaluation: PT evaluation complete.  Pt. requires mod/ max A x 2 for functional mobility skills; able to stand w/ RW and maintain R LE NWB x 2 trials.  Pt. able to clear hips from bed but not achieve upright stance.  Discussed importance of maintaining NWB R LE, importance of mobilization.  Pt. will require ongoing skilled PT to achieve optimal safe function.  Will continue to follow.       Time Calculation:    PT Charges       Row Name 25 1415             Time Calculation    PT Received On 25  -      PT - Next Appointment 25  -      PT Goal Re-Cert Due Date 25  -                User Key  (r) = Recorded By, (t) = Taken By, (c) = Cosigned By      Initials Name Provider Type     Erika Villalpando, PT Physical Therapist                  Therapy Charges for Today       Code Description Service Date Service Provider Modifiers Qty    [id number] HC PT EVAL HIGH COMPLEXITY 4 2025 Erika Villalpando, PT GP 1            PT G-Codes  AM-PAC 6 Clicks Score (PT): 11    Erika Villalpando PT  2025      Electronically signed by Erika Villalpando, PT at 25 1440          Occupational Therapy Notes (most recent note)        Lorena Vasquez, OT at 25 1525          Patient Name: Johanna Street  : 1945    MRN: [id number]                              Today's Date: 2025       Admit Date: 2025    Visit Dx:     ICD-10-CM ICD-9-CM   1. Closed fracture of right tibial plateau, initial encounter  S82.141A 823.00     Patient Active Problem List   Diagnosis    PAF (paroxysmal atrial fibrillation) 2013. [id number] score is 2.    Ankle edema    Depression    Anxiety    Paraesophageal hernia    Gastroesophageal reflux disease    Dysphagia    Abnormal EKG, left anterior fascicular  block    Chronic anticoagulation    Tibial plateau fracture, right     Past Medical History:   Diagnosis Date    Ankle edema     Anxiety     Arthritis     Atrial fibrillation     Back pain     Depression     Hernia, hiatal     PAF (paroxysmal atrial fibrillation)      Past Surgical History:   Procedure Laterality Date    APPENDECTOMY      CARDIAC CATHETERIZATION  12/2013    CHOLECYSTECTOMY      COLON SURGERY      COLONOSCOPY  2006    ENDOSCOPY N/A 12/7/2017    Procedure: ESOPHAGOGASTRODUODENOSCOPY WITH ANESTHESIA;  Surgeon: Eliseo Moon MD;  Location: [facility];  Service:     ENDOSCOPY N/A 2/21/2018    Procedure: ESOPHAGOGASTRODUODENOSCOPY WITH DILATATION CPT CODE: 46476;  Surgeon: Tomi Dey III, MD;  Location: [facility];  Service:     EYE SURGERY      HYSTERECTOMY      NERVE REPAIR      TIBIAL PLATEAU OPEN REDUCTION INTERNAL FIXATION Right 5/6/2025    Procedure: TIBIAL PLATEAU OPEN REDUCTION INTERNAL FIXATION;  Surgeon: Bon Harrell DO;  Location: [facility];  Service: Orthopedics;  Laterality: Right;    TONSILLECTOMY        General Information       Row Name 05/07/25 1513          OT Time and Intention    Subjective Information complains of;pain;weakness  -     Document Type evaluation  -     Mode of Treatment individual therapy;occupational therapy  -     Patient Effort adequate  -     Symptoms Noted During/After Treatment dizziness  -     Comment Patient agreeable to OT evaluation. Granddaughter present throughout session.  -       Row Name 05/07/25 3623          General Information    Patient Profile Reviewed yes  -     Prior Level of Function --  patient reports she was able to complete simple daily tasks within home but stayed in living room/kitchen/dining room area; sleeps on azeem lounge; family checks on her weekly to assist with buying groceries  -     Existing Precautions/Restrictions fall;non-weight bearing  -     Barriers to Rehab previous functional  deficit;cognitive status  -       Row Name 05/07/25 1513          Occupational Profile    Reason for Services/Referral (Occupational Profile) Patient admitted to [facility] on 5/2/2025 status post fall. She underwent ORIF for right tibial plateau fracture on 5/6/2025. Patient referred for OT evaluation due to change in functional performance with ADLs, functional mobility, and/or transfers.  -       Row Name 05/07/25 1513          Living Environment    Current Living Arrangements home  -     People in Home alone  -       Row Name 05/07/25 1513          Home Main Entrance    Number of Stairs, Main Entrance one  -       Row Name 05/07/25 1513          Cognition    Orientation Status (Cognition) oriented to;person;place  -[facility] Name 05/07/25 1513          Safety Issues/Impairments Affecting Functional Mobility    Safety Issues Affecting Function (Mobility) awareness of need for assistance;insight into deficits/self-awareness;judgment  -     Impairments Affecting Function (Mobility) balance;endurance/activity tolerance;cognition;pain;range of motion (ROM);strength  -     Cognitive Impairments, Mobility Safety/Performance insight into deficits/self-awareness;awareness, need for assistance;judgment  -               User Key  (r) = Recorded By, (t) = Taken By, (c) = Cosigned By      Initials Name Provider Type     Lorena Vasquez, OT Occupational Therapist                     Mobility/ADL's       Row Name 05/07/25 1519          Bed Mobility    Bed Mobility rolling left;rolling right;scooting/bridging;supine-sit;sit-supine  -     Rolling Left [place] (Bed Mobility) verbal cues;nonverbal cues (demo/gesture);maximum assist (25% patient effort)  -     Rolling Right [place] (Bed Mobility) verbal cues;nonverbal cues (demo/gesture);maximum assist (25% patient effort)  -     Scooting/Bridging [place] (Bed Mobility) minimum assist (75% patient effort)  -     Supine-Sit  [place] (Bed Mobility) verbal cues;nonverbal cues (demo/gesture);maximum assist (25% patient effort)  -     Sit-Supine [place] (Bed Mobility) verbal cues;nonverbal cues (demo/gesture);maximum assist (25% patient effort);2 person assist  -     Bed Mobility, Safety Issues decreased use of legs for bridging/pushing;decreased use of arms for pushing/pulling;cognitive deficits limit understanding  -     Assistive Device (Bed Mobility) bed rails;repositioning sheet  -       Row Name 05/07/25 1519          Transfers    Transfers sit-stand transfer;stand-sit transfer  -       Row Name 05/07/25 1519          Sit-Stand Transfer    Sit-Stand [place] (Transfers) verbal cues;nonverbal cues (demo/gesture);moderate assist (50% patient effort);2 person assist;maximum assist (25% patient effort)  -     Assistive Device (Sit-Stand Transfers) walker, front-wheeled  -[facility] Name 05/07/25 1519          Stand-Sit Transfer    Stand-Sit [place] (Transfers) verbal cues;nonverbal cues (demo/gesture);moderate assist (50% patient effort);2 person assist  -     Assistive Device (Stand-Sit Transfers) walker, front-wheeled  -       Row Name 05/07/25 1519          Activities of Daily Living    BADL Assessment/Intervention bathing;upper body dressing;lower body dressing;grooming;toileting  -[facility] Name 05/07/25 1519          Mobility    Extremity Weight-bearing Status right lower extremity  -     Right Lower Extremity (Weight-bearing Status) non weight-bearing (NWB)  right knee immobilizer donned throughout  -       Row Name 05/07/25 1519          Bathing Assessment/Intervention    [place] Level (Bathing) bathing skills;maximum assist (25% patient effort)  -       Row Name 05/07/25 1519          Upper Body Dressing Assessment/Training    [place] Level (Upper Body Dressing) upper body dressing skills;maximum assist (25% patient effort)  -       Row Name 05/07/25 1519          Lower  Body Dressing Assessment/Training    [place] Level (Lower Body Dressing) lower body dressing skills;dependent (less than 25% patient effort)  -[facility] Name 05/07/25 1519          Grooming Assessment/Training    [place] Level (Grooming) grooming skills;maximum assist (25% patient effort)  -[facility] Name 05/07/25 1519          Toileting Assessment/Training    [place] Level (Toileting) toileting skills;dependent (less than 25% patient effort)  -               User Key  (r) = Recorded By, (t) = Taken By, (c) = Cosigned By      Initials Name Provider Type     Lorena Vasquez OT Occupational Therapist                   Obj/Interventions       [facility] Name 05/07/25 1520          Sensory Assessment (Somatosensory)    Sensory Assessment (Somatosensory) UE sensation intact  -KP       Row Name 05/07/25 1520          Vision Assessment/Intervention    Visual Impairment/Limitations WFL;corrective lenses full-time  -KP       Row Name 05/07/25 1520          Range of Motion Comprehensive    Comment, General Range of Motion severely limited AROM proximally in shoulders of BUEs (<25% movement); patient able to bring bilateral hands to face  -KP       Row Name 05/07/25 1520          Strength Comprehensive (MMT)    Comment, General Manual Muscle Testing (MMT) Assessment 2+/5 MMT in BUEs  -KP       Row Name 05/07/25 1520          Motor Skills    Motor Skills coordination;functional endurance  -     Coordination fine motor deficit;gross motor deficit;bilateral;upper extremity;minimal impairment  -     Functional Endurance poor plus  -KP       Row Name 05/07/25 1520          Balance    Balance Assessment sitting static balance;standing static balance  -     Static Sitting Balance contact guard  -     Static Standing Balance maximum assist;2-person assist;non-verbal cues (demo/gesture);verbal cues  -     Position/Device Used, Standing Balance walker, rolling  -               User Key  (r) = Recorded By,  (t) = Taken By, (c) = Cosigned By      Initials Name Provider Type     Lorena Vasquez, OT Occupational Therapist                   Goals/Plan       Row Name 05/07/25 1524          Transfer Goal 1 (OT)    Activity/Assistive Device (Transfer Goal 1, OT) toilet;commode, 3-in-1  -     [place] Level/Cues Needed (Transfer Goal 1, OT) moderate assist (50-74% patient effort)  -     Time Frame (Transfer Goal 1, OT) by discharge  -       Row Name 05/07/25 1524          Problem Specific Goal 1 (OT)    Problem Specific Goal 1 (OT) Patient will perform sustained activity X12 minutes to promote functional endurance/activity tolerance needed for daily routine.  -     Time Frame (Problem Specific Goal 1, OT) by discharge  -       Row Name 05/07/25 1524          Therapy Assessment/Plan (OT)    Planned Therapy Interventions (OT) activity tolerance training;functional balance retraining;patient/caregiver education/training;BADL retraining;ROM/therapeutic exercise;strengthening exercise;occupation/activity based interventions;transfer/mobility retraining;passive ROM/stretching  -               User Key  (r) = Recorded By, (t) = Taken By, (c) = Cosigned By      Initials Name Provider Type     Lorena Vasquez, OT Occupational Therapist                   Clinical Impression       Row Name 05/07/25 1521          Pain Assessment    Pretreatment Pain Rating 1/10  -     Posttreatment Pain Rating 2/10  -     Pain Location extremity  -     Pain Side/Orientation right;lower  -       Row Name 05/07/25 1521          Plan of Care Review    Plan of Care Reviewed With patient;grandchild(jono)  -     Progress no change  -     Outcome Evaluation Patient seen for OT evaluationl. She presents with functional limitations including generalized weakness, pain, impaired balance, limited ROM, and decreased activity tolerance/functional endurance. Patient with decreased safety evidenced by questionable insight into limitations,  judgement, and need for assist. Patient would benefit from ongoing OT services to promote highest level of independence and safety prior to discharge. Patient would benefit rom inpatient rehab stay to return to prior level for safest return home.  -       Row Name 05/07/25 1521          Therapy Assessment/Plan (OT)    Patient/Family Therapy Goal Statement (OT) return home  -     Rehab Potential (OT) good  -     Criteria for Skilled Therapeutic Interventions Met (OT) yes;meets criteria;skilled treatment is necessary  -     Therapy Frequency (OT) 5 times/wk  -     Predicted Duration of Therapy Intervention (OT) discharge  -       Row Name 05/07/25 1521          Therapy Plan Review/Discharge Plan (OT)    Anticipated Discharge Disposition (OT) inpatient rehabilitation facility  -       Row Name 05/07/25 1521          Positioning and Restraints    Pre-Treatment Position in bed  -     Post Treatment Position bed  -     In Bed fowlers;call light within reach;encouraged to call for assist;exit alarm on;RLE elevated;R heel elevated  -               User Key  (r) = Recorded By, (t) = Taken By, (c) = Cosigned By      Initials Name Provider Type    Lorena Rockwell, OT Occupational Therapist                   Outcome Measures       Row Name 05/07/25 0805          How much help from another person do you currently need...    Turning from your back to your side while in flat bed without using bedrails? 3  -VM     Moving from lying on back to sitting on the side of a flat bed without bedrails? 2  -VM     Moving to and from a bed to a chair (including a wheelchair)? 2  -VM     Standing up from a chair using your arms (e.g., wheelchair, bedside chair)? 2  -VM     Climbing 3-5 steps with a railing? 1  -VM     To walk in hospital room? 1  -VM     AM-PAC 6 Clicks Score (PT) 11  -VM               User Key  (r) = Recorded By, (t) = Taken By, (c) = Cosigned By      Initials Name Provider Type    Jerri Nelson  Katty RN Registered Nurse                      OT Recommendation and Plan  Planned Therapy Interventions (OT): activity tolerance training, functional balance retraining, patient/caregiver education/training, BADL retraining, ROM/therapeutic exercise, strengthening exercise, occupation/activity based interventions, transfer/mobility retraining, passive ROM/stretching  Therapy Frequency (OT): 5 times/wk  Plan of Care Review  Plan of Care Reviewed With: patient, grandchild(jono)  Progress: no change  Outcome Evaluation: Patient seen for OT evaluationl. She presents with functional limitations including generalized weakness, pain, impaired balance, limited ROM, and decreased activity tolerance/functional endurance. Patient with decreased safety evidenced by questionable insight into limitations, judgement, and need for assist. Patient would benefit from ongoing OT services to promote highest level of independence and safety prior to discharge. Patient would benefit rom inpatient rehab stay to return to prior level for safest return home.     Time Calculation:         Time Calculation- OT       Row Name 05/07/25 1525             Time Calculation- OT    OT Received On 05/07/25  -                User Key  (r) = Recorded By, (t) = Taken By, (c) = Cosigned By      Initials Name Provider Type    Lorena Rockwell OT Occupational Therapist                  Therapy Charges for Today       Code Description Service Date Service Provider Modifiers Qty    [id number] HC OT EVAL MOD COMPLEXITY 4 5/7/2025 Lorena Vasquez OT GO 1                 Lorena Vasquez OT  5/7/2025    Electronically signed by Lorena Vasquez OT at 05/07/25 1525     Abby Maza BSW     Case Management     Case Management/Social Work     Signed     Date of Service: 05/05/25 1614  Creation Time: 05/05/25 1614     Signed         Discharge Planning Assessment  VERONICA Denson     Patient Name: Johanna Street                MRN: [id number]  Today's Date:  5/5/2025                  Admit Date: 5/2/2025     Plan: SS received a consult for [facility] shortterm placement. SS spoke with pt at bedside on this date. Pt lives alone. PCP Is Bi Youssef. Pt does not utilize home health at this time. Pt does not utilize DME at this time. Pt does not have a POA/Living will. Pt states she is scheduled for surgery on 5/6/25. Pt states she would be option to rehab options but would like for SS to follow up after surgery about disposition. SS to follow and assist with discharge planning.    Discharge Needs Assessment         Row Name 05/05/25 1605           Living Environment     People in Home alone     Current Living Arrangements home     Potentially Unsafe Housing Conditions none     Primary Care Provided by self     Provides Primary Care For no one     Family Caregiver if Needed other relative(s)     Quality of Family Relationships unable to assess     Able to Return to Prior Arrangements yes           Resource/Environmental Concerns     Resource/Environmental Concerns none     Transportation Concerns none           Transition Planning     Patient/Family Anticipates Transition to home     Patient/Family Anticipated Services at Transition none     Transportation Anticipated family or friend will provide           Discharge Needs Assessment     Equipment Currently Used at Home none     Anticipated Changes Related to Illness none     Equipment Needed After Discharge none                         Discharge Plan         Row Name 05/05/25 1606           Plan     Plan SS received a consult for [facility]-operative shortterm placement. SS spoke with pt at bedside on this date. Pt lives alone. PCP Is Bi Youssef. Pt does not utilize home health at this time. Pt does not utilize DME at this time. Pt does not have a POA/Living will. Pt states she is scheduled for surgery on 5/6/25. Pt states she would be option to rehab options but would like for SS to follow up after surgery about disposition.  SS to follow and assist with discharge planning.     Patient/Family in Agreement with Plan yes                   Expected Discharge Date and Time         Expected Discharge Date Expected Discharge Time     May 6, 2025                 Demographic Summary         Row Name 05/05/25 1604           General Information     Admission Type inpatient     Referral Source nursing     Reason for Consult discharge planning  SS received a consult for [facility] shortterm placement.                       JESUS MarinW

## 2025-05-08 NOTE — PROGRESS NOTES
Inpatient Progress Note  Johanna Street  Date: 05/08/25  MRN: [id number]      Subjective:    Resting in bed.  Notes pain to the right knee is well-controlled.  No chest pain or shortness of air.  (+) flatulence.  Vital signs stable.  No acute events overnight.  Notes plan for discharge to short-term rehab.      Objective:      Vitals:    05/07/25 1932 05/07/25 2300 05/08/25 0300 05/08/25 0656   BP: 103/52 107/63 105/56 108/68   BP Location:  Right arm Right arm Right arm   Patient Position:  Lying Lying Lying   Pulse: 120 104 66    Resp:  16 18 18   Temp:  98 °F (36.7 °C) 98.2 °F (36.8 °C) 97.7 °F (36.5 °C)   TempSrc:  Oral Oral Oral   SpO2:       Weight:       Height:              Physical Exam:  Constitutional:  Well-developed, well-nourished.  No acute distress.        Musculoskeletal:  Upon examination of the right lower extremity, the surgical dressing and knee immobilizer are in place. The dressing is clean and dry. No edema, erythema, or ecchymosis noted to the tissues above or below the dressing. Able to flex and extend the ankle and digits. Capillary refill is brisk. Light touch sensation is intact, distally. (+) DP pulse.      Labs:    Results from last 7 days   Lab Units 05/08/25  0127 05/07/25  0118 05/05/25  0015   WBC 10*3/mm3 9.08 12.19* 9.71   HEMOGLOBIN g/dL 10.2* 11.3* 10.3*   HEMATOCRIT % 31.8* 36.2 32.8*   MCV fL 94.1 96.5 97.3*   MCHC g/dL 32.1 31.2* 31.4*   PLATELETS 10*3/mm3 276 309 202         Results from last 7 days   Lab Units 05/08/25  0127 05/07/25  0118 05/05/25  1038 05/05/25  0015 05/03/25  0107 05/02/25  1633   SODIUM mmol/L 128* 134*  --  138 131* 135*   POTASSIUM mmol/L 4.0 5.2 4.3 3.6 5.0 4.3   MAGNESIUM mg/dL  --   --   --   --   --  2.0   CHLORIDE mmol/L 92* 96*  --  106 96* 97*   CO2 mmol/L 24.0 25.6  --  20.6* 21.0* 21.7*   BUN mg/dL 10 9  --  8 11 11   CREATININE mg/dL 0.75 0.79  --  0.68 0.89 0.96   CALCIUM mg/dL 8.6 8.9  --  8.1* 9.1 9.7   GLUCOSE mg/dL 134* 127*  --  144*  "138* 160*   ALBUMIN g/dL 2.7* 3.0*  --   --  3.5 4.2   BILIRUBIN mg/dL 0.9 1.0  --   --  1.4* 1.1   ALK PHOS U/L 152* 135*  --   --  109 120*   AST (SGOT) U/L 33* 45*  --   --  33* 20   ALT (SGPT) U/L 13 23  --   --  16 14   Estimated Creatinine Clearance: 59.7 mL/min (by C-G formula based on SCr of 0.75 mg/dL).  No results found for: \"AMMONIA\"          No results found for: \"HGBA1C\"  Lab Results   Component Value Date    TSH 4.760 (H) 05/02/2025         Pain Management Panel          Latest Ref Rng & Units 5/3/2025   Pain Management Panel   Amphetamine, Urine Qual Negative Negative    Barbiturates Screen, Urine Negative Negative    Benzodiazepine Screen, Urine Negative Positive    Buprenorphine, Screen, Urine Negative Negative    Cocaine Screen, Urine Negative Negative    Fentanyl, Urine Negative Negative    Methadone Screen , Urine Negative Negative    Methamphetamine, Ur Negative Negative        No results found for: \"BLOODCX\"  No results found for: \"URINECX\"  No results found for: \"WOUNDCX\"  No results found for: \"STOOLCX\"              Radiology:  Imaging Results (Last 72 Hours)       Procedure Component Value Units Date/Time    FL Surgery Fluoro [[id number]] Collected: 05/07/25 0759     Updated: 05/07/25 0802    Narrative:      EXAMINATION: [place] SURGERY FLUORO-      CLINICAL INDICATION: tibfid; S82.141A-Displaced bicondylar fracture of  right tibia, initial encounter for closed fracture        COMPARISON: None available     Total fluoroscopy time 210.3 seconds     Fluoroscopy was provided and 8 images were acquired during surgery     For a full procedural report, please see the report provided by the  performing physician        This report was finalized on 5/7/2025 8:00 AM by Dr. Lane Boothe MD.                 Assessment:      Status post right lateral tibial plateau ORIF with open lateral meniscus repair, POD #2.       Plan:      Strict non weight bearing to the right lower extremity.     Maintain the " surgical dressing and knee immobilizer.     PT/OT for mobility.     On Lovenox for DVT prophylaxis.     Pain control.     Orthopedic surgery following.    DAVID Onofre   May 8, 2025   08:21 EDT

## 2025-05-08 NOTE — THERAPY TREATMENT NOTE
Acute Care - Physical Therapy Treatment Note   Jarett     Patient Name: Johanna Street  : 1945  MRN: [id number]  Today's Date: 2025   Onset of Illness/Injury or Date of Surgery: 25  Visit Dx:     ICD-10-CM ICD-9-CM   1. Closed fracture of right tibial plateau, initial encounter  S82.141A 823.00     Patient Active Problem List   Diagnosis    PAF (paroxysmal atrial fibrillation) 2013. [id number] score is 2.    Ankle edema    Depression    Anxiety    Paraesophageal hernia    Gastroesophageal reflux disease    Dysphagia    Abnormal EKG, left anterior fascicular block    Chronic anticoagulation    Tibial plateau fracture, right     Past Medical History:   Diagnosis Date    Ankle edema     Anxiety     Arthritis     Atrial fibrillation     Back pain     Depression     Hernia, hiatal     PAF (paroxysmal atrial fibrillation)      Past Surgical History:   Procedure Laterality Date    APPENDECTOMY      CARDIAC CATHETERIZATION  2013    CHOLECYSTECTOMY      COLON SURGERY      COLONOSCOPY      ENDOSCOPY N/A 2017    Procedure: ESOPHAGOGASTRODUODENOSCOPY WITH ANESTHESIA;  Surgeon: Eliseo Moon MD;  Location: [facility];  Service:     ENDOSCOPY N/A 2018    Procedure: ESOPHAGOGASTRODUODENOSCOPY WITH DILATATION CPT CODE: 88575;  Surgeon: Tomi Dey III, MD;  Location: [facility];  Service:     EYE SURGERY      HYSTERECTOMY      NERVE REPAIR      TIBIAL PLATEAU OPEN REDUCTION INTERNAL FIXATION Right 2025    Procedure: TIBIAL PLATEAU OPEN REDUCTION INTERNAL FIXATION;  Surgeon: Bon Harrell DO;  Location: [facility];  Service: Orthopedics;  Laterality: Right;    TONSILLECTOMY       PT Assessment (Last 12 Hours)       PT Evaluation and Treatment       Row Name 25 1318          Physical Therapy Time and Intention    Subjective Information complains of;pain  -HC     Document Type therapy note (daily note)  -HC     Mode of Treatment physical therapy  -HC     Patient  Effort adequate  -     Comment Pt and RN in agreement for PT. Pt sat EOB with max A x 2. Attempted to stand max A x 2 and RW. LE exercises with L LE.  -       Row Name 05/08/25 1318          General Information    Patient Profile Reviewed yes  -     Equipment Currently Used at Home walker, rolling  -     Existing Precautions/Restrictions fall;non-weight bearing  -     Limitations/Impairments safety/cognitive  -       Row Name 05/08/25 1318          Living Environment    Primary Care Provided by self  -       Row Name 05/08/25 1318          Home Use of Assistive/Adaptive Equipment    Equipment Currently Used at Home walker, rolling  -       Row Name 05/08/25 1318          Pain    Pain Side/Orientation right;lower  -       Row Name 05/08/25 1318          Cognition    Affect/Mental Status (Cognition) --  irritable  -     Orientation Status (Cognition) oriented to;person;place  decr insight into situation; also decr orientation to time, believes she just came out of surgery today.  -     Follows Commands (Cognition) verbal cues/prompting required;repetition of directions required  -     Personal Safety Interventions fall prevention program maintained;gait belt;nonskid shoes/slippers when out of bed;supervised activity  -       Row Name 05/08/25 1318          Mobility    Extremity Weight-bearing Status right lower extremity  -     Right Lower Extremity (Weight-bearing Status) non weight-bearing (NWB)  -       Row Name 05/08/25 1318          Bed Mobility    Bed Mobility rolling left;rolling right;scooting/bridging;supine-sit;sit-supine  -     Rolling Left [place] (Bed Mobility) verbal cues;nonverbal cues (demo/gesture);maximum assist (25% patient effort)  -HC     Rolling Right [place] (Bed Mobility) verbal cues;nonverbal cues (demo/gesture);maximum assist (25% patient effort)  -HC     Scooting/Bridging [place] (Bed Mobility) maximum assist (25% patient effort);2 person assist   -     Supine-Sit [place] (Bed Mobility) verbal cues;nonverbal cues (demo/gesture);maximum assist (25% patient effort);2 person assist  -HC     Sit-Supine [place] (Bed Mobility) verbal cues;nonverbal cues (demo/gesture);maximum assist (25% patient effort);2 person assist  -HC     Bed Mobility, Safety Issues decreased use of legs for bridging/pushing;decreased use of arms for pushing/pulling;cognitive deficits limit understanding  -     Assistive Device (Bed Mobility) bed rails;repositioning sheet  -       Row Name 05/08/25 1318          Transfers    Transfers sit-stand transfer;stand-sit transfer  -       Row Name 05/08/25 1318          Sit-Stand Transfer    Sit-Stand [place] (Transfers) verbal cues;nonverbal cues (demo/gesture);2 person assist;maximum assist (25% patient effort)  -     Assistive Device (Sit-Stand Transfers) walker, front-wheeled  -       Row Name 05/08/25 1318          Stand-Sit Transfer    Stand-Sit [place] (Transfers) verbal cues;nonverbal cues (demo/gesture);2 person assist;maximum assist (25% patient effort);moderate assist (50% patient effort)  -     Assistive Device (Stand-Sit Transfers) walker, front-wheeled  -       Row Name 05/08/25 1318          Motor Skills    Therapeutic Exercise other (see comments)  L LE: AP, LAQ, March  -       Row Name             Wound 05/06/25 1609 Right anterior knee Surgical Open Surgical Incision    Wound - Properties Group Placement Date: 05/06/25  -CE Placement Time: 1609  -CE Side: Right  -CE Orientation: anterior  -CE Location: knee  -CE Primary Wound Type: Surgical  -CE Secondary Wound Type - Surgical: Open Surg  -CE    Retired Wound - Properties Group Placement Date: 05/06/25  -CE Placement Time: 1609  -CE Side: Right  -CE Orientation: anterior  -CE Location: knee  -CE    Retired Wound - Properties Group Placement Date: 05/06/25  -CE Placement Time: 1609  -CE Side: Right  -CE Orientation: anterior  -CE Location: knee   -CE    Retired Wound - Properties Group Date first assessed: 05/06/25  -CE Time first assessed: 1609  -CE Side: Right  -CE Location: knee  -CE      Row Name             Wound 05/07/25 1756 Left proximal arm Infiltration/Extravasation    Wound - Properties Group Placement Date: 05/07/25  -EB Placement Time: 1756  -EB Side: Left  -EB Orientation: proximal  -EB Location: arm  -EB Primary Wound Type: Infilt/Extra  -EB    Retired Wound - Properties Group Placement Date: 05/07/25  -EB Placement Time: 1756  -EB Side: Left  -EB Orientation: proximal  -EB Location: arm  -EB    Retired Wound - Properties Group Placement Date: 05/07/25  -EB Placement Time: 1756  -EB Side: Left  -EB Orientation: proximal  -EB Location: arm  -EB    Retired Wound - Properties Group Date first assessed: 05/07/25  -EB Time first assessed: 1756  -EB Side: Left  -EB Location: arm  -EB      Row Name 05/08/25 1318          Coping    Observed Emotional State irritable  -     Verbalized Emotional State acceptance  -     Family/Support Persons --  granddaughter  -     Involvement in Care at bedside  -       Row Name 05/08/25 1318          Positioning and Restraints    Pre-Treatment Position in bed  -HC     Post Treatment Position bed  -HC     In Bed fowlers;call light within reach;encouraged to call for assist;exit alarm on;side rails up x2  -       Row Name 05/08/25 1318          Therapy Assessment/Plan (PT)    Rehab Potential (PT) good  -     Criteria for Skilled Interventions Met (PT) yes;meets criteria;skilled treatment is necessary  -     Therapy Frequency (PT) 6 times/wk  Monday-Saturday  -     Problem List (PT) problems related to;balance;cognition;mobility;strength;pain;range of motion (ROM)  -     Activity Limitations Related to Problem List (PT) unable to ambulate safely;unable to transfer safely;BADLs not performed adequately or safely  -       Row Name 05/08/25 1318          Physical Therapy Goals    Bed Mobility Goal  Selection (PT) bed mobility, PT goal 1  -     Transfer Goal Selection (PT) transfer, PT goal 1;transfer, PT goal 2  -       Row Name 05/08/25 1318          Bed Mobility Goal 1 (PT)    Activity/Assistive Device (Bed Mobility Goal 1, PT) rolling to left;rolling to right;sit to supine;scooting;supine to sit  -HC     [place] Level/Cues Needed (Bed Mobility Goal 1, PT) minimum assist (75% or more patient effort)  -HC     Time Frame (Bed Mobility Goal 1, PT) by discharge  -       Row Name 05/08/25 1318          Transfer Goal 1 (PT)    Activity/Assistive Device (Transfer Goal 1, PT) sit-to-stand/stand-to-sit;walker, rolling  -HC     [place] Level/Cues Needed (Transfer Goal 1, PT) moderate assist (50-74% patient effort)  -HC     Time Frame (Transfer Goal 1, PT) by discharge  -       Row Name 05/08/25 1318          Transfer Goal 2 (PT)    Activity/Assistive Device (Transfer Goal 2, PT) bed-to-chair/chair-to-bed;toilet;walker, rolling  -HC     [place] Level/Cues Needed (Transfer Goal 2, PT) moderate assist (50-74% patient effort)  -HC     Time Frame (Transfer Goal 2, PT) by discharge  -               User Key  (r) = Recorded By, (t) = Taken By, (c) = Cosigned By      Initials Name Provider Type    Lee Petit, RN Registered Nurse    Kannan Moseley RN Registered Nurse    Bethany Resendiz PTA Physical Therapist Assistant                    Physical Therapy Education       Title: PT OT SLP Therapies (In Progress)       Topic: Physical Therapy (In Progress)       Point: Mobility training (In Progress)       Learning Progress Summary            Patient Acceptance, E, NR by SC at 5/8/2025 0524    Acceptance, E,D, VU,NR by AG at 5/7/2025 1417   Family Acceptance, E,D, VU,NR by AG at 5/7/2025 1417                      Point: Home exercise program (In Progress)       Learning Progress Summary            Patient Acceptance, E, NR by SC at 5/8/2025 0524    Acceptance, E,D, VU,NR by AG at  5/7/2025 1417   Family Acceptance, E,D, VU,NR by  at 5/7/2025 1417                      Point: Body mechanics (In Progress)       Learning Progress Summary            Patient Acceptance, E, NR by SC at 5/8/2025 0524    Acceptance, E,D, VU,NR by  at 5/7/2025 1417   Family Acceptance, E,D, VU,NR by  at 5/7/2025 1417                      Point: Precautions (In Progress)       Learning Progress Summary            Patient Acceptance, E, NR by SC at 5/8/2025 0524    Acceptance, E,D, VU,NR by  at 5/7/2025 1417   Family Acceptance, E,D, VU,NR by  at 5/7/2025 1417                                      User Key       Initials Effective Dates Name Provider Type Discipline     06/16/21 -  Erika Villalpando, PT Physical Therapist PT    SC 09/11/24 -  Kaylene Garner, RN Registered Nurse Nurse                  PT Recommendation and Plan  Anticipated Discharge Disposition (PT): inpatient rehabilitation facility  Therapy Frequency (PT): 6 times/wk (Monday-Saturday)          Time Calculation:    PT Charges       Row Name 05/08/25 1321             Time Calculation    PT Received On 05/08/25  -         Time Calculation- PT    Total Timed Code Minutes- PT 24 minute(s)  -                User Key  (r) = Recorded By, (t) = Taken By, (c) = Cosigned By      Initials Name Provider Type     Bethany Tiwari PTA Physical Therapist Assistant                  Therapy Charges for Today       Code Description Service Date Service Provider Modifiers Qty    [id number]  PT THER PROC EA 15 MIN 5/8/2025 Bethany Tiwari PTA GP, CQ 1    [id number] HC PT THERAPEUTIC ACT EA 15 MIN 5/8/2025 Bethany Tiwari PTA GP, CQ 1            PT G-Codes  AM-PAC 6 Clicks Score (PT): 11    Bethany Tiwari PTA  5/8/2025

## 2025-05-08 NOTE — PLAN OF CARE
Goal Outcome Evaluation:  Plan of Care Reviewed With: patient        Progress: no change  Outcome Evaluation: Patient resting in bed at this itme. A&Ox2, intermittent confusion noted. VSS on RA. Patient worked with PT/OT this shift. Patient lost IV access this AM, unable to gain access, midline consult in but nobody her today to complete. NS ordered, will start when we gain IV access. No requests or complaints at this time. Will continue with POC.

## 2025-05-09 VITALS
HEART RATE: 110 BPM | TEMPERATURE: 98.4 F | BODY MASS INDEX: 33.8 KG/M2 | WEIGHT: 179.01 LBS | SYSTOLIC BLOOD PRESSURE: 104 MMHG | DIASTOLIC BLOOD PRESSURE: 61 MMHG | HEIGHT: 61 IN | OXYGEN SATURATION: 98 % | RESPIRATION RATE: 18 BRPM

## 2025-05-09 LAB
ALBUMIN SERPL-MCNC: 2.7 G/DL (ref 3.5–5.2)
ALBUMIN/GLOB SERPL: 1 G/DL
ALP SERPL-CCNC: 153 U/L (ref 39–117)
ALT SERPL W P-5'-P-CCNC: 8 U/L (ref 1–33)
ANION GAP SERPL CALCULATED.3IONS-SCNC: 10.4 MMOL/L (ref 5–15)
AST SERPL-CCNC: 27 U/L (ref 1–32)
BASOPHILS # BLD AUTO: 0.04 10*3/MM3 (ref 0–0.2)
BASOPHILS NFR BLD AUTO: 0.5 % (ref 0–1.5)
BILIRUB SERPL-MCNC: 0.6 MG/DL (ref 0–1.2)
BUN SERPL-MCNC: 9 MG/DL (ref 8–23)
BUN/CREAT SERPL: 12.7 (ref 7–25)
CALCIUM SPEC-SCNC: 8.2 MG/DL (ref 8.6–10.5)
CHLORIDE SERPL-SCNC: 99 MMOL/L (ref 98–107)
CO2 SERPL-SCNC: 24.6 MMOL/L (ref 22–29)
CREAT SERPL-MCNC: 0.71 MG/DL (ref 0.57–1)
DEPRECATED RDW RBC AUTO: 45.8 FL (ref 37–54)
EGFRCR SERPLBLD CKD-EPI 2021: 87.2 ML/MIN/1.73
EOSINOPHIL # BLD AUTO: 0.28 10*3/MM3 (ref 0–0.4)
EOSINOPHIL NFR BLD AUTO: 3.4 % (ref 0.3–6.2)
ERYTHROCYTE [DISTWIDTH] IN BLOOD BY AUTOMATED COUNT: 13.3 % (ref 12.3–15.4)
GLOBULIN UR ELPH-MCNC: 2.8 GM/DL
GLUCOSE SERPL-MCNC: 116 MG/DL (ref 65–99)
HCT VFR BLD AUTO: 28.2 % (ref 34–46.6)
HGB BLD-MCNC: 8.9 G/DL (ref 12–15.9)
IMM GRANULOCYTES # BLD AUTO: 0.03 10*3/MM3 (ref 0–0.05)
IMM GRANULOCYTES NFR BLD AUTO: 0.4 % (ref 0–0.5)
LYMPHOCYTES # BLD AUTO: 1.57 10*3/MM3 (ref 0.7–3.1)
LYMPHOCYTES NFR BLD AUTO: 19.1 % (ref 19.6–45.3)
MCH RBC QN AUTO: 30 PG (ref 26.6–33)
MCHC RBC AUTO-ENTMCNC: 31.6 G/DL (ref 31.5–35.7)
MCV RBC AUTO: 94.9 FL (ref 79–97)
MONOCYTES # BLD AUTO: 0.48 10*3/MM3 (ref 0.1–0.9)
MONOCYTES NFR BLD AUTO: 5.8 % (ref 5–12)
NEUTROPHILS NFR BLD AUTO: 5.83 10*3/MM3 (ref 1.7–7)
NEUTROPHILS NFR BLD AUTO: 70.8 % (ref 42.7–76)
NRBC BLD AUTO-RTO: 0 /100 WBC (ref 0–0.2)
PLATELET # BLD AUTO: 269 10*3/MM3 (ref 140–450)
PMV BLD AUTO: 9.1 FL (ref 6–12)
POTASSIUM SERPL-SCNC: 3.6 MMOL/L (ref 3.5–5.2)
POTASSIUM SERPL-SCNC: 4.4 MMOL/L (ref 3.5–5.2)
PROT SERPL-MCNC: 5.5 G/DL (ref 6–8.5)
RBC # BLD AUTO: 2.97 10*6/MM3 (ref 3.77–5.28)
SODIUM SERPL-SCNC: 134 MMOL/L (ref 136–145)
WBC NRBC COR # BLD AUTO: 8.23 10*3/MM3 (ref 3.4–10.8)

## 2025-05-09 PROCEDURE — 84132 ASSAY OF SERUM POTASSIUM: CPT | Performed by: INTERNAL MEDICINE

## 2025-05-09 PROCEDURE — 25810000003 SODIUM CHLORIDE 0.9 % SOLUTION: Performed by: INTERNAL MEDICINE

## 2025-05-09 PROCEDURE — 80053 COMPREHEN METABOLIC PANEL: CPT | Performed by: INTERNAL MEDICINE

## 2025-05-09 PROCEDURE — 85025 COMPLETE CBC W/AUTO DIFF WBC: CPT | Performed by: INTERNAL MEDICINE

## 2025-05-09 PROCEDURE — 25010000002 ENOXAPARIN PER 10 MG: Performed by: ORTHOPAEDIC SURGERY

## 2025-05-09 PROCEDURE — 97110 THERAPEUTIC EXERCISES: CPT

## 2025-05-09 PROCEDURE — 97530 THERAPEUTIC ACTIVITIES: CPT

## 2025-05-09 PROCEDURE — 99239 HOSP IP/OBS DSCHRG MGMT >30: CPT | Performed by: INTERNAL MEDICINE

## 2025-05-09 PROCEDURE — C1751 CATH, INF, PER/CENT/MIDLINE: HCPCS

## 2025-05-09 PROCEDURE — 25010000002 CEFAZOLIN PER 500 MG: Performed by: ORTHOPAEDIC SURGERY

## 2025-05-09 PROCEDURE — 36410 VNPNXR 3YR/> PHY/QHP DX/THER: CPT

## 2025-05-09 RX ORDER — ENOXAPARIN SODIUM 100 MG/ML
30 INJECTION SUBCUTANEOUS EVERY 12 HOURS
Start: 2025-05-09 | End: 2025-05-09

## 2025-05-09 RX ORDER — AMOXICILLIN 250 MG
2 CAPSULE ORAL 2 TIMES DAILY
Start: 2025-05-09

## 2025-05-09 RX ORDER — METHOCARBAMOL 500 MG/1
500 TABLET, FILM COATED ORAL EVERY 8 HOURS PRN
Start: 2025-05-09

## 2025-05-09 RX ORDER — POLYETHYLENE GLYCOL 3350 17 G/17G
17 POWDER, FOR SOLUTION ORAL DAILY
Start: 2025-05-09

## 2025-05-09 RX ORDER — DIAZEPAM 5 MG/1
5 TABLET ORAL NIGHTLY
Qty: 3 TABLET | Refills: 0 | Status: SHIPPED | OUTPATIENT
Start: 2025-05-09

## 2025-05-09 RX ORDER — OXYCODONE HYDROCHLORIDE 5 MG/1
5 TABLET ORAL EVERY 8 HOURS PRN
Qty: 9 TABLET | Refills: 0 | Status: SHIPPED | OUTPATIENT
Start: 2025-05-09

## 2025-05-09 RX ORDER — SODIUM CHLORIDE 9 MG/ML
40 INJECTION, SOLUTION INTRAVENOUS AS NEEDED
Status: DISCONTINUED | OUTPATIENT
Start: 2025-05-09 | End: 2025-05-09 | Stop reason: HOSPADM

## 2025-05-09 RX ORDER — SODIUM CHLORIDE 0.9 % (FLUSH) 0.9 %
10 SYRINGE (ML) INJECTION AS NEEDED
Status: DISCONTINUED | OUTPATIENT
Start: 2025-05-09 | End: 2025-05-09 | Stop reason: HOSPADM

## 2025-05-09 RX ORDER — POTASSIUM CHLORIDE 1.5 G/1.58G
40 POWDER, FOR SOLUTION ORAL EVERY 4 HOURS
Status: COMPLETED | OUTPATIENT
Start: 2025-05-09 | End: 2025-05-09

## 2025-05-09 RX ORDER — SODIUM CHLORIDE 0.9 % (FLUSH) 0.9 %
10 SYRINGE (ML) INJECTION EVERY 12 HOURS SCHEDULED
Status: DISCONTINUED | OUTPATIENT
Start: 2025-05-09 | End: 2025-05-09 | Stop reason: HOSPADM

## 2025-05-09 RX ADMIN — ENOXAPARIN SODIUM 30 MG: 30 INJECTION SUBCUTANEOUS at 11:00

## 2025-05-09 RX ADMIN — POTASSIUM CHLORIDE 40 MEQ: 1.5 POWDER, FOR SOLUTION ORAL at 05:28

## 2025-05-09 RX ADMIN — SODIUM CHLORIDE 100 ML/HR: 9 INJECTION, SOLUTION INTRAVENOUS at 02:52

## 2025-05-09 RX ADMIN — POLYETHYLENE GLYCOL (3350) 17 G: 17 POWDER, FOR SOLUTION ORAL at 14:09

## 2025-05-09 RX ADMIN — BISACODYL 10 MG: 10 SUPPOSITORY RECTAL at 14:08

## 2025-05-09 RX ADMIN — ENOXAPARIN SODIUM 30 MG: 30 INJECTION SUBCUTANEOUS at 00:02

## 2025-05-09 RX ADMIN — SENNOSIDES AND DOCUSATE SODIUM 2 TABLET: 50; 8.6 TABLET ORAL at 08:09

## 2025-05-09 RX ADMIN — ACETAMINOPHEN 1000 MG: 500 TABLET ORAL at 00:02

## 2025-05-09 RX ADMIN — SOTALOL HYDROCHLORIDE 80 MG: 80 TABLET ORAL at 08:09

## 2025-05-09 RX ADMIN — ACETAMINOPHEN 1000 MG: 500 TABLET ORAL at 08:09

## 2025-05-09 RX ADMIN — CEFAZOLIN 2000 MG: 2 INJECTION, POWDER, FOR SOLUTION INTRAMUSCULAR; INTRAVENOUS at 00:02

## 2025-05-09 RX ADMIN — MUPIROCIN 1 APPLICATION: 20 OINTMENT TOPICAL at 10:57

## 2025-05-09 RX ADMIN — POTASSIUM CHLORIDE 40 MEQ: 1.5 POWDER, FOR SOLUTION ORAL at 09:28

## 2025-05-09 RX ADMIN — PANTOPRAZOLE SODIUM 40 MG: 40 TABLET, DELAYED RELEASE ORAL at 05:24

## 2025-05-09 RX ADMIN — CEFAZOLIN 2000 MG: 2 INJECTION, POWDER, FOR SOLUTION INTRAMUSCULAR; INTRAVENOUS at 08:09

## 2025-05-09 RX ADMIN — Medication 10 ML: at 08:11

## 2025-05-09 RX ADMIN — Medication 10 ML: at 10:57

## 2025-05-09 NOTE — DISCHARGE SUMMARY
[facility] DISCHARGE SUMMARY      Date of Admission: 5/2/2025    Date of Discharge: 5/9/2025     PCP: Bi Youssef MD    Admission Diagnosis:   Please see admission H&P    Discharge Diagnosis:   Right lateral tibial plateau fracture   Chronic Afib  Essential HTN  Acute normocytic anemia   Mild hyponatremia  GERD    Procedures Performed:  5/6/25: Right tibial plateau ORIF and open lateral meniscus repair   5/9/25:Midline catheter placement left basilic vein      Consults:   Consults       Date and Time Order Name Status Description    5/2/2025 11:35 PM Inpatient Orthopedic Surgery Consult Completed     5/2/2025 10:31 PM Inpatient Hospitalist Consult                History of Present Illness:  Johanna Street is a 79 y.o. female who presented to [facility] ED with CC of right leg pain. Please see admission H&P for complete details.     In the ED, x-rays of the right shoulder, fever and ankle did not reveal any acute findings. X-ray of the right tib-fib did reveal tibial plateau fracture with possible fibular shaft fracture. CT of the RLE without contrast revealed a lateral tibial plateau fracture with depression. Her care was discussed with orthopedic surgeon on call who recommended a knee immobilizer with close outpatient follow up. However, concern was noted that she may be unable to care for herself at home with increased risk for fall and decision was made for admission for PT/OT and possible placement.      Hospital Course  Johanna Street was admitted to the med/surg floor for further evaluation and treatment. Her home medications were restarted with exception of Eliquis pending formal surgical eval. PRN analgesics were made available. PT/OT and orthopedic surgery were consulted.     Upon initial evaluation, orthopedic surgery recommended outpatient follow-up. However, as she lives alone with concern that she would not be able to manage independently the decision was later made to proceed with surgical  intervention as an inpatient. Eliquis was held and she underwent right tibial plateau ORIF and open lateral meniscus repair on 5/6/25. She tolerated the procedure well and remained clinically stable postoperatively. She was placed on DVT PPX with SQ Lovenox BID per ortho recs with plans to resume Eliquis when okay with orthopedic surgery. Routine labs revealed worsening anemia with probable component of acute blood loss but she did not require any transfusions. Mild hyponatremia was noted but improved with gentle IVF's and holding her home Lasix with PO intake encouraged. She was monitored on telemetry with a few brief episodes of tachycardia but remained overall stable. She participated with PT/OT and she was deemed medically stable for discharge on 5/9 once SNF placement was secured. Instructions were given for outpatient follow up with PCP and orthopedic surgery.     Condition on Discharge:  Stable    Vital Signs  Vitals:    05/09/25 0624   BP: 102/64   Pulse: 110   Resp: 18   Temp: 98.2 °F (36.8 °C)   SpO2: 98%       Physical Exam:  General:    Awake, alert, in no acute distress   Heart:      Normal S1 and S2. Irregularly irregular.    Lungs:     Respirations regular, even and unlabored. Lungs clear to auscultation B/L. No wheezes, rales or rhonchi.   Abdomen:   Soft and nontender. No guarding, rebound tenderness or  organomegaly noted. Bowel sounds present x 4.   Extremities:  No clubbing, cyanosis or LLE edema noted. (+) knee immobilizer and surgical dressing RLE.      Discharge Disposition:   SNF      Discharge Medications:     Discharge Medications        New Medications        Instructions Start Date   enoxaparin sodium 30 MG/0.3ML solution prefilled syringe syringe  Commonly known as: LOVENOX   30 mg, Subcutaneous, Every 12 Hours      methocarbamol 500 MG tablet  Commonly known as: ROBAXIN   500 mg, Oral, Every 8 Hours PRN      oxyCODONE 5 MG immediate release tablet  Commonly known as: ROXICODONE   5 mg,  Oral, Every 8 Hours PRN      polyethylene glycol 17 g packet  Commonly known as: MIRALAX   17 g, Oral, Daily      sennosides-docusate 8.6-50 MG per tablet  Commonly known as: PERICOLACE   2 tablets, Oral, 2 Times Daily             Changes to Medications        Instructions Start Date   diazePAM 5 MG tablet  Commonly known as: VALIUM  What changed: when to take this   5 mg, Oral, Nightly             Continue These Medications        Instructions Start Date   divalproex 125 MG DR tablet  Commonly known as: DEPAKOTE   125 mg, Oral, Daily PRN      lansoprazole 15 MG capsule  Commonly known as: PREVACID   15 mg, Daily      sotalol 80 MG tablet  Commonly known as: BETAPACE   80 mg, 2 Times Daily             Stop These Medications      apixaban 5 MG tablet tablet  Commonly known as: Eliquis     furosemide 20 MG tablet  Commonly known as: LASIX                Discharge Diet:   Dietary Orders (From admission, onward)       Start     Ordered    05/08/25 0753  Diet: Regular/House; Fluid Consistency: Thin (IDDSI 0)  Diet Effective Now        References:    Diet Order Definitions   Question Answer Comment   Diets: Regular/House    Fluid Consistency: Thin (IDDSI 0)        05/08/25 0752                    Activity at Discharge:  NWB RLE    Follow-up Appointments:  Additional Instructions for the Follow-ups that You Need to Schedule       Discharge Follow-up with PCP   As directed       Currently Documented PCP:    Bi Youssef MD    PCP Phone Number:    [phone]     Follow Up Details: Follow up with PCP at [facility] within 1 week.        Discharge Follow-up with Specified Provider: Follow up with orthopedic surgery in 2 weeks.   As directed      To: Follow up with orthopedic surgery in 2 weeks.               Contact information for follow-up providers       Bon Harrell,  Follow up in 2 week(s).    Specialty: Orthopedic Surgery  Contact information:  [address] [place] [address] [phone]                Bi Youssef MD .    Specialty: Family Medicine  Why: Follow up with PCP at SNF within 1 week.  Contact information:  [address] [place] [address] [phone]                       Contact information for after-discharge care       Destination       [facility] .    Service: Skilled Nursing  Contact information:  [facility] [address] [phone]                                       Test Results Pending at Discharge:       The ASCVD Risk score (Dianna ANDRADE, et al., 2019) failed to calculate for the following reasons:    Cannot find a previous HDL lab    Cannot find a previous total cholesterol lab      Maurilio Patel DO  05/09/25  09:53 EDT      Time: Greater than 30 minutes spent on this discharge.

## 2025-05-09 NOTE — THERAPY TREATMENT NOTE
Acute Care - Physical Therapy Treatment Note   Jarett     Patient Name: Johanna Street  : 1945  MRN: [id number]  Today's Date: 2025   Onset of Illness/Injury or Date of Surgery: 25  Visit Dx:     ICD-10-CM ICD-9-CM   1. Closed fracture of right tibial plateau, initial encounter  S82.141A 823.00   2. Anxiety  F41.9 300.00     Patient Active Problem List   Diagnosis    PAF (paroxysmal atrial fibrillation) 2013. [id number] score is 2.    Ankle edema    Depression    Anxiety    Paraesophageal hernia    Gastroesophageal reflux disease    Dysphagia    Abnormal EKG, left anterior fascicular block    Chronic anticoagulation    Tibial plateau fracture, right     Past Medical History:   Diagnosis Date    Ankle edema     Anxiety     Arthritis     Atrial fibrillation     Back pain     Depression     Hernia, hiatal     PAF (paroxysmal atrial fibrillation)      Past Surgical History:   Procedure Laterality Date    APPENDECTOMY      CARDIAC CATHETERIZATION  2013    CHOLECYSTECTOMY      COLON SURGERY      COLONOSCOPY      ENDOSCOPY N/A 2017    Procedure: ESOPHAGOGASTRODUODENOSCOPY WITH ANESTHESIA;  Surgeon: Eliseo Moon MD;  Location: [facility];  Service:     ENDOSCOPY N/A 2018    Procedure: ESOPHAGOGASTRODUODENOSCOPY WITH DILATATION CPT CODE: 60001;  Surgeon: Tomi Dey III, MD;  Location: [facility];  Service:     EYE SURGERY      HYSTERECTOMY      NERVE REPAIR      TIBIAL PLATEAU OPEN REDUCTION INTERNAL FIXATION Right 2025    Procedure: TIBIAL PLATEAU OPEN REDUCTION INTERNAL FIXATION;  Surgeon: Bon Harrell DO;  Location: [facility];  Service: Orthopedics;  Laterality: Right;    TONSILLECTOMY       PT Assessment (Last 12 Hours)       PT Evaluation and Treatment       Row Name 25 1303          Physical Therapy Time and Intention    Subjective Information complains of;weakness;fatigue;pain  -HC     Document Type therapy note (daily note)  -HC     Mode of  Treatment physical therapy  -HC     Patient Effort adequate  -HC     Comment Pt and RN inagreement for PT. Pt stood EOB with RW min/ mod A x 2, pt was able to maintain WB status. EOB exercises to tolerance.  -       Row Name 05/09/25 1303          General Information    Patient Profile Reviewed yes  -HC     Equipment Currently Used at Home walker, rolling  -HC     Existing Precautions/Restrictions fall;non-weight bearing  -     Limitations/Impairments safety/cognitive  -       Row Name 05/09/25 1303          Living Environment    Primary Care Provided by self  -       Row Name 05/09/25 1303          Home Use of Assistive/Adaptive Equipment    Equipment Currently Used at Home walker, rolling  -HC       Row Name 05/09/25 1303          Pain    Pain Side/Orientation right;lower  -       Row Name 05/09/25 1303          Cognition    Affect/Mental Status (Cognition) --  irritable  -     Orientation Status (Cognition) oriented to;person;place  decr insight into situation; also decr orientation to time, believes she just came out of surgery today.  -     Follows Commands (Cognition) verbal cues/prompting required;repetition of directions required  -     Personal Safety Interventions fall prevention program maintained;gait belt;nonskid shoes/slippers when out of bed;supervised activity  -       Row Name 05/09/25 1303          Mobility    Extremity Weight-bearing Status right lower extremity  -     Right Lower Extremity (Weight-bearing Status) non weight-bearing (NWB)  -       Row Name 05/09/25 1303          Bed Mobility    Bed Mobility rolling left;rolling right;scooting/bridging;supine-sit;sit-supine  -     Rolling Left [place] (Bed Mobility) verbal cues;nonverbal cues (demo/gesture);maximum assist (25% patient effort)  -HC     Rolling Right [place] (Bed Mobility) verbal cues;nonverbal cues (demo/gesture);maximum assist (25% patient effort)  -     Scooting/Bridging [place] (Bed  Mobility) maximum assist (25% patient effort);2 person assist  -     Supine-Sit [place] (Bed Mobility) verbal cues;nonverbal cues (demo/gesture);maximum assist (25% patient effort);2 person assist  -HC     Sit-Supine [place] (Bed Mobility) verbal cues;nonverbal cues (demo/gesture);maximum assist (25% patient effort);2 person assist  -HC     Bed Mobility, Safety Issues decreased use of legs for bridging/pushing;decreased use of arms for pushing/pulling;cognitive deficits limit understanding  -     Assistive Device (Bed Mobility) bed rails;repositioning sheet  -       Row Name 05/09/25 1303          Transfers    Transfers sit-stand transfer;stand-sit transfer  -       Row Name 05/09/25 1303          Sit-Stand Transfer    Sit-Stand [place] (Transfers) verbal cues;nonverbal cues (demo/gesture);2 person assist;moderate assist (50% patient effort)  -     Assistive Device (Sit-Stand Transfers) walker, front-wheeled  -       Row Name 05/09/25 1303          Stand-Sit Transfer    Stand-Sit [place] (Transfers) verbal cues;nonverbal cues (demo/gesture);2 person assist;moderate assist (50% patient effort);minimum assist (75% patient effort)  -     Assistive Device (Stand-Sit Transfers) walker, front-wheeled  -       Row Name 05/09/25 1303          Motor Skills    Therapeutic Exercise other (see comments)  EOB: L AP, LAQ  -       Row Name             Wound 05/06/25 1609 Right anterior knee Surgical Open Surgical Incision    Wound - Properties Group Placement Date: 05/06/25  -CE Placement Time: 1609  -CE Side: Right  -CE Orientation: anterior  -CE Location: knee  -CE Primary Wound Type: Surgical  -CE Secondary Wound Type - Surgical: Open Surg  -CE    Retired Wound - Properties Group Placement Date: 05/06/25  -CE Placement Time: 1609  -CE Side: Right  -CE Orientation: anterior  -CE Location: knee  -CE    Retired Wound - Properties Group Placement Date: 05/06/25  -CE Placement Time: 1609  -CE  Side: Right  -CE Orientation: anterior  -CE Location: knee  -CE    Retired Wound - Properties Group Date first assessed: 05/06/25  -CE Time first assessed: 1609  -CE Side: Right  -CE Location: knee  -CE      Row Name             Wound 05/07/25 1756 Left proximal arm Infiltration/Extravasation    Wound - Properties Group Placement Date: 05/07/25  -EB Placement Time: 1756  -EB Side: Left  -EB Orientation: proximal  -EB Location: arm  -EB Primary Wound Type: Infilt/Extra  -EB    Retired Wound - Properties Group Placement Date: 05/07/25  -EB Placement Time: 1756  -EB Side: Left  -EB Orientation: proximal  -EB Location: arm  -EB    Retired Wound - Properties Group Placement Date: 05/07/25  -EB Placement Time: 1756  -EB Side: Left  -EB Orientation: proximal  -EB Location: arm  -EB    Retired Wound - Properties Group Date first assessed: 05/07/25  -EB Time first assessed: 1756  -EB Side: Left  -EB Location: arm  -EB      Row Name 05/09/25 1303          Coping    Observed Emotional State irritable  -     Verbalized Emotional State acceptance  -     Family/Support Persons --  granddaughter  -     Involvement in Care at bedside  -       Row Name 05/09/25 1303          Positioning and Restraints    Pre-Treatment Position in bed  -     Post Treatment Position bed  -HC     In Bed fowlers;call light within reach;exit alarm on;encouraged to call for assist;side rails up x2  -       Row Name 05/09/25 1303          Therapy Assessment/Plan (PT)    Rehab Potential (PT) good  -     Criteria for Skilled Interventions Met (PT) yes;meets criteria;skilled treatment is necessary  -     Therapy Frequency (PT) 6 times/wk  Monday-Saturday  -     Problem List (PT) problems related to;balance;cognition;mobility;strength;pain;range of motion (ROM)  -     Activity Limitations Related to Problem List (PT) unable to ambulate safely;unable to transfer safely;BADLs not performed adequately or safely  -       Row Name 05/09/25  1303          Physical Therapy Goals    Bed Mobility Goal Selection (PT) bed mobility, PT goal 1  -     Transfer Goal Selection (PT) transfer, PT goal 1;transfer, PT goal 2  -       Row Name 05/09/25 1303          Bed Mobility Goal 1 (PT)    Activity/Assistive Device (Bed Mobility Goal 1, PT) rolling to left;rolling to right;sit to supine;scooting;supine to sit  -     Amelia Level/Cues Needed (Bed Mobility Goal 1, PT) minimum assist (75% or more patient effort)  -HC     Time Frame (Bed Mobility Goal 1, PT) by discharge  -       Row Name 05/09/25 1303          Transfer Goal 1 (PT)    Activity/Assistive Device (Transfer Goal 1, PT) sit-to-stand/stand-to-sit;walker, rolling  -HC     [place] Level/Cues Needed (Transfer Goal 1, PT) moderate assist (50-74% patient effort)  -HC     Time Frame (Transfer Goal 1, PT) by discharge  -       Row Name 05/09/25 1303          Transfer Goal 2 (PT)    Activity/Assistive Device (Transfer Goal 2, PT) bed-to-chair/chair-to-bed;toilet;walker, rolling  -HC     Amelia Level/Cues Needed (Transfer Goal 2, PT) moderate assist (50-74% patient effort)  -HC     Time Frame (Transfer Goal 2, PT) by discharge  -               User Key  (r) = Recorded By, (t) = Taken By, (c) = Cosigned By      Initials Name Provider Type    Lee Petit, RN Registered Nurse    Kannan Moseley, RN Registered Nurse    Bethany Resendiz PTA Physical Therapist Assistant                    Physical Therapy Education       Title: PT OT SLP Therapies (Resolved)       Topic: Physical Therapy (Resolved)       Point: Mobility training (Resolved)       Learning Progress Summary            Patient Acceptance, E, NR by SC at 5/8/2025 0524    Acceptance, E,D, VU,NR by AG at 5/7/2025 1417   Family Acceptance, E,D, VU,NR by AG at 5/7/2025 1417                      Point: Home exercise program (Resolved)       Learning Progress Summary            Patient Acceptance, E, NR by SC at  5/8/2025 0524    Acceptance, E,D, VU,NR by  at 5/7/2025 1417   Family Acceptance, E,D, VU,NR by  at 5/7/2025 1417                      Point: Body mechanics (Resolved)       Learning Progress Summary            Patient Acceptance, E, NR by SC at 5/8/2025 0524    Acceptance, E,D, VU,NR by  at 5/7/2025 1417   Family Acceptance, E,D, VU,NR by  at 5/7/2025 1417                      Point: Precautions (Resolved)       Learning Progress Summary            Patient Acceptance, E, NR by SC at 5/8/2025 0524    Acceptance, E,D, VU,NR by  at 5/7/2025 1417   Family Acceptance, E,D, VU,NR by  at 5/7/2025 1417                                      User Key       Initials Effective Dates Name Provider Type Discipline     06/16/21 -  Erika Villalpando, PT Physical Therapist PT    SC 09/11/24 -  Kaylene Garner RN Registered Nurse Nurse                  PT Recommendation and Plan  Anticipated Discharge Disposition (PT): inpatient rehabilitation facility  Therapy Frequency (PT): 6 times/wk (Monday-Saturday)          Time Calculation:    PT Charges       Row Name 05/09/25 1307             Time Calculation    PT Received On 05/09/25  -         Time Calculation- PT    Total Timed Code Minutes- PT 23 minute(s)  -                User Key  (r) = Recorded By, (t) = Taken By, (c) = Cosigned By      Initials Name Provider Type     Bethany Tiwari, PTA Physical Therapist Assistant                  Therapy Charges for Today       Code Description Service Date Service Provider Modifiers Qty    02025651013 HC PT THER PROC EA 15 MIN 5/8/2025 Bethany Tiwari, PTA GP, CQ 1    77969769622 HC PT THERAPEUTIC ACT EA 15 MIN 5/8/2025 Bethany Tiwari, PTA GP, CQ 1    76482872332 HC PT THER PROC EA 15 MIN 5/9/2025 Bethany Tiwari, PTA GP, CQ 1    20103017635 HC PT THERAPEUTIC ACT EA 15 MIN 5/9/2025 Bethany Tiwari, PTA GP, CQ 1            PT G-Codes  AM-PAC 6 Clicks Score (PT): 11    Bethany L [place],  PTA  5/9/2025

## 2025-05-09 NOTE — PAYOR COMM NOTE
"CONTACT: FLORIN CALVO RN  UTILIZATION MANAGEMENT DEPT.  [address] [address]  PHONE: [phone]  FAX: [phone]        DISCHARGE NOTIFICATION  DC DATE: 05/09/25 TO SNF    REF#[id number]       Essence Garcia (79 y.o. Female)       Date of Birth   1945    Social Security Number       Address   [address] [place] [address] [place], [place] [address]    Home Phone   [phone]    MRN   [id number]       [facility]    Marital Status                               Admission Date   5/2/2025    Admission Type   Emergency    Admitting Provider   Pia Wren DO    Attending Provider       Department, Room/Bed   [address], 6035/1P       Discharge Date   5/9/2025    Discharge Disposition   Skilled Nursing Facility (DC - External)    Discharge Destination   Other                              Attending Provider: (none)   Allergies: Codeine, Diltiazem, Latex, Zinc, Levaquin [Levofloxacin], Penicillins    Isolation: None   Infection: None   Code Status: CPR    Ht: 154.9 cm (61\")   Wt: 81.2 kg (179 lb 0.2 oz)    Admission Cmt: None   Principal Problem: Tibial plateau fracture, right [S82.141A]                   Active Insurance as of 5/2/2025       Primary Coverage       Payor Plan Insurance Group Employer/Plan Group    ANTHEM MEDICARE REPLACEMENT ANTHEM MEDICARE ADVANTAGE PPO [id number]       Payor Plan Address Payor Plan Phone Number Payor Plan Fax Number Effective Dates    [phone]  5/1/2024 - None Entered    [facility] [id number]         Subscriber Name Subscriber Birth Date Member ID       ESSENCE GARCIA 1945 [id number]                     Emergency Contacts        (Rel.) Home Phone Work Phone Mobile Phone    Rosanne Hargrove (Grandchild) [phone] -- --    MERE OMALLEY (Sister) [phone] -- --    Chapito Heard (Brother) -- -- [phone]                 Discharge Summary        Maurilio Patel " DO NILA at 05/09/25 0953              [facility] DISCHARGE SUMMARY      Date of Admission: 5/2/2025    Date of Discharge: 5/9/2025     PCP: Bi Youssef MD    Admission Diagnosis:   Please see admission H&P    Discharge Diagnosis:   Right lateral tibial plateau fracture   Chronic Afib  Essential HTN  Acute normocytic anemia   Mild hyponatremia  GERD    Procedures Performed:  5/6/25: Right tibial plateau ORIF and open lateral meniscus repair   5/9/25:Midline catheter placement left basilic vein      Consults:   Consults       Date and Time Order Name Status Description    5/2/2025 11:35 PM Inpatient Orthopedic Surgery Consult Completed     5/2/2025 10:31 PM Inpatient Hospitalist Consult                History of Present Illness:  Johanna Street is a 79 y.o. female who presented to [facility] ED with CC of right leg pain. Please see admission H&P for complete details.     In the ED, x-rays of the right shoulder, fever and ankle did not reveal any acute findings. X-ray of the right tib-fib did reveal tibial plateau fracture with possible fibular shaft fracture. CT of the RLE without contrast revealed a lateral tibial plateau fracture with depression. Her care was discussed with orthopedic surgeon on call who recommended a knee immobilizer with close outpatient follow up. However, concern was noted that she may be unable to care for herself at home with increased risk for fall and decision was made for admission for PT/OT and possible placement.      Hospital Course  Johanna Street was admitted to the med/surg floor for further evaluation and treatment. Her home medications were restarted with exception of Eliquis pending formal surgical eval. PRN analgesics were made available. PT/OT and orthopedic surgery were consulted.     Upon initial evaluation, orthopedic surgery recommended outpatient follow-up. However, as she lives alone with concern that she would not be able to manage independently the decision was later  made to proceed with surgical intervention as an inpatient. Eliquis was held and she underwent right tibial plateau ORIF and open lateral meniscus repair on 5/6/25. She tolerated the procedure well and remained clinically stable postoperatively. She was placed on DVT PPX with SQ Lovenox BID per ortho recs with plans to resume Eliquis when okay with orthopedic surgery. Routine labs revealed worsening anemia with probable component of acute blood loss but she did not require any transfusions. Mild hyponatremia was noted but improved with gentle IVF's and holding her home Lasix with PO intake encouraged. She was monitored on telemetry with a few brief episodes of tachycardia but remained overall stable. She participated with PT/OT and she was deemed medically stable for discharge on 5/9 once SNF placement was secured. Instructions were given for outpatient follow up with PCP and orthopedic surgery.     Condition on Discharge:  Stable    Vital Signs  Vitals:    05/09/25 0624   BP: 102/64   Pulse: 110   Resp: 18   Temp: 98.2 °F (36.8 °C)   SpO2: 98%       Physical Exam:  General:    Awake, alert, in no acute distress   Heart:      Normal S1 and S2. Irregularly irregular.    Lungs:     Respirations regular, even and unlabored. Lungs clear to auscultation B/L. No wheezes, rales or rhonchi.   Abdomen:   Soft and nontender. No guarding, rebound tenderness or  organomegaly noted. Bowel sounds present x 4.   Extremities:  No clubbing, cyanosis or LLE edema noted. (+) knee immobilizer and surgical dressing RLE.      Discharge Disposition:   SNF      Discharge Medications:     Discharge Medications        New Medications        Instructions Start Date   enoxaparin sodium 30 MG/0.3ML solution prefilled syringe syringe  Commonly known as: LOVENOX   30 mg, Subcutaneous, Every 12 Hours      methocarbamol 500 MG tablet  Commonly known as: ROBAXIN   500 mg, Oral, Every 8 Hours PRN      oxyCODONE 5 MG immediate release tablet  Commonly  known as: ROXICODONE   5 mg, Oral, Every 8 Hours PRN      polyethylene glycol 17 g packet  Commonly known as: MIRALAX   17 g, Oral, Daily      sennosides-docusate 8.6-50 MG per tablet  Commonly known as: PERICOLACE   2 tablets, Oral, 2 Times Daily             Changes to Medications        Instructions Start Date   diazePAM 5 MG tablet  Commonly known as: VALIUM  What changed: when to take this   5 mg, Oral, Nightly             Continue These Medications        Instructions Start Date   divalproex 125 MG DR tablet  Commonly known as: DEPAKOTE   125 mg, Oral, Daily PRN      lansoprazole 15 MG capsule  Commonly known as: PREVACID   15 mg, Daily      sotalol 80 MG tablet  Commonly known as: BETAPACE   80 mg, 2 Times Daily             Stop These Medications      apixaban 5 MG tablet tablet  Commonly known as: Eliquis     furosemide 20 MG tablet  Commonly known as: LASIX                Discharge Diet:   Dietary Orders (From admission, onward)       Start     Ordered    05/08/25 0753  Diet: Regular/House; Fluid Consistency: Thin (IDDSI 0)  Diet Effective Now        References:    Diet Order Definitions   Question Answer Comment   Diets: Regular/House    Fluid Consistency: Thin (IDDSI 0)        05/08/25 0752                    Activity at Discharge:  NWB RLE    Follow-up Appointments:  Additional Instructions for the Follow-ups that You Need to Schedule       Discharge Follow-up with PCP   As directed       Currently Documented PCP:    Bi Youssef MD    PCP Phone Number:    [phone]     Follow Up Details: Follow up with PCP at SNF within 1 week.        Discharge Follow-up with Specified Provider: Follow up with orthopedic surgery in 2 weeks.   As directed      To: Follow up with orthopedic surgery in 2 weeks.               Contact information for follow-up providers       Bon Harrell, DO Follow up in 2 week(s).    Specialty: Orthopedic Surgery  Contact information:  [address] [place]  [address]  [phone]               Bi Youssef MD .    Specialty: Family Medicine  Why: Follow up with PCP at SNF within 1 week.  Contact information:   [address] [id number] [phone]                       Contact information for after-discharge care       Destination       [facility] .    Service: Skilled Nursing  Contact information:  [address] [facility] [address]  [phone]                                       Test Results Pending at Discharge:       The ASCVD Risk score (Dianna DK, et al., 2019) failed to calculate for the following reasons:    Cannot find a previous HDL lab    Cannot find a previous total cholesterol lab      Carri Galloway DO  05/09/25  09:53 EDT      Time: Greater than 30 minutes spent on this discharge.           Electronically signed by Carri Galloway DO at 05/09/25 1511       Discharge Order (From admission, onward)       Start     Ordered    05/09/25 0950  Discharge patient  Once        Expected Discharge Date: 05/09/25   Discharge Disposition: Skilled Nursing Facility (DC - External)   Physician of Record for Attribution - Please select from Treatment Team: CARRI GALLOWAY [[id number]]   Review needed by CMO to determine Physician of Record: No      Question Answer Comment   Physician of Record for Attribution - Please select from Treatment Team CARIR GALLOWAY    Review needed by CMO to determine Physician of Record No        05/09/25 0953

## 2025-05-09 NOTE — CASE MANAGEMENT/SOCIAL WORK
Discharge Planning Assessment  [facility]     Patient Name: Johanna Street  MRN: [id number]  Today's Date: 5/9/2025    Admit Date: 5/2/2025            Discharge Plan       Row Name 05/09/25 1105       Plan    Final Discharge Disposition Code 03 - skilled nursing facility (SNF)    Final Note Pt is being discharged to [facility] on this date. Pt and Pt's grand-daughter are aware and agreeable to discharge. SS to fax AVS summary once pharmacy has approved it to print. SS completed PCS form.    11:45am: SS faxed AVS summary to fax [phone].     13:50pm: SS provided RN report number for [facility] [phone] 2nd floor.     13:57pm: SS scheduled EMS via EMS dispatch tab.                  Continued Care and Services - Admitted Since 5/2/2025       Destination Coordination complete.      Service Provider Request Status Services Address Phone Fax Patient Preferred    [facility]  Selected Skilled Nursing [address] [facility] [address] [phone] --                  Expected Discharge Date and Time       Expected Discharge Date Expected Discharge Time    May 9, 2025           IHSAN Mckeon

## 2025-05-09 NOTE — THERAPY TREATMENT NOTE
Acute Care - Occupational Therapy Treatment Note   Jarett     Patient Name: Johanna Street  : 1945  MRN: [id number]  Today's Date: 2025  Onset of Illness/Injury or Date of Surgery: 25     Referring Physician: Dr. Harrell    Admit Date: 2025       ICD-10-CM ICD-9-CM   1. Closed fracture of right tibial plateau, initial encounter  S82.141A 823.00   2. Anxiety  F41.9 300.00     Patient Active Problem List   Diagnosis    PAF (paroxysmal atrial fibrillation) 2013. [id number] score is 2.    Ankle edema    Depression    Anxiety    Paraesophageal hernia    Gastroesophageal reflux disease    Dysphagia    Abnormal EKG, left anterior fascicular block    Chronic anticoagulation    Tibial plateau fracture, right     Past Medical History:   Diagnosis Date    Ankle edema     Anxiety     Arthritis     Atrial fibrillation     Back pain     Depression     Hernia, hiatal     PAF (paroxysmal atrial fibrillation)      Past Surgical History:   Procedure Laterality Date    APPENDECTOMY      CARDIAC CATHETERIZATION  2013    CHOLECYSTECTOMY      COLON SURGERY      COLONOSCOPY      ENDOSCOPY N/A 2017    Procedure: ESOPHAGOGASTRODUODENOSCOPY WITH ANESTHESIA;  Surgeon: Eliseo Moon MD;  Location: [facility];  Service:     ENDOSCOPY N/A 2018    Procedure: ESOPHAGOGASTRODUODENOSCOPY WITH DILATATION CPT CODE: 26078;  Surgeon: Tomi Dey III, MD;  Location: [facility];  Service:     EYE SURGERY      HYSTERECTOMY      NERVE REPAIR      TIBIAL PLATEAU OPEN REDUCTION INTERNAL FIXATION Right 2025    Procedure: TIBIAL PLATEAU OPEN REDUCTION INTERNAL FIXATION;  Surgeon: Bon Harrell DO;  Location: [facility];  Service: Orthopedics;  Laterality: Right;    TONSILLECTOMY           OT ASSESSMENT FLOWSHEET (Last 12 Hours)       OT Evaluation and Treatment       Row Name 25 1219                   OT Time and Intention    Subjective Information complains of;weakness;pain  -LA         Document Type therapy note (daily note)  -LA        Mode of Treatment occupational therapy  -LA        Patient Effort good  -LA           General Information    Patient Profile Reviewed yes  -LA        General Observations of Patient Patient agreeable to therapy this date. Patient with increased tolerance of standing this date. Continued maxA with bed mobility. Patient oriented however somewhat confused this date.  -LA        Existing Precautions/Restrictions fall;non-weight bearing  -LA           Cognition    Affect/Mental Status (Cognition) confused  -LA        Orientation Status (Cognition) oriented x 3  -LA        Follows Commands (Cognition) WFL  -LA           Bed Mobility    Supine-Sit Clarke (Bed Mobility) maximum assist (25% patient effort)  -LA           Sit-Stand Transfer    Sit-Stand Clarke (Transfers) minimum assist (75% patient effort);moderate assist (50% patient effort);2 person assist  -LA           Motor Skills    Motor Skills coordination;functional endurance  -LA        Coordination WFL  -LA        Functional Endurance fair-  -LA        Therapeutic Exercise shoulder;elbow/forearm;wrist;hand  -LA           Balance    Static Sitting Balance supervision;standby assist  -LA           Wound 05/06/25 1609 Right anterior knee Surgical Open Surgical Incision    Wound - Properties Group Placement Date: 05/06/25  -CE Placement Time: 1609  -CE Side: Right  -CE Orientation: anterior  -CE Location: knee  -CE Primary Wound Type: Surgical  -CE Secondary Wound Type - Surgical: Open Surg  -CE    Retired Wound - Properties Group Placement Date: 05/06/25  -CE Placement Time: 1609  -CE Side: Right  -CE Orientation: anterior  -CE Location: knee  -CE    Retired Wound - Properties Group Placement Date: 05/06/25  -CE Placement Time: 1609  -CE Side: Right  -CE Orientation: anterior  -CE Location: knee  -CE    Retired Wound - Properties Group Date first assessed: 05/06/25  -CE Time first assessed: 1609 -CE  Side: Right  -CE Location: knee  -CE       Wound 05/07/25 1756 Left proximal arm Infiltration/Extravasation    Wound - Properties Group Placement Date: 05/07/25  -EB Placement Time: 1756 -EB Side: Left  -EB Orientation: proximal  -EB Location: arm  -EB Primary Wound Type: Infilt/Extra  -EB    Retired Wound - Properties Group Placement Date: 05/07/25  -EB Placement Time: 1756 -EB Side: Left  -EB Orientation: proximal  -EB Location: arm  -EB    Retired Wound - Properties Group Placement Date: 05/07/25  -EB Placement Time: 1756 -EB Side: Left  -EB Orientation: proximal  -EB Location: arm  -EB    Retired Wound - Properties Group Date first assessed: 05/07/25  -EB Time first assessed: 1756 -EB Side: Left  -EB Location: arm  -EB       Plan of Care Review    Plan of Care Reviewed With patient  -LA           Positioning and Restraints    Pre-Treatment Position in bed  -LA        Post Treatment Position bed  -LA        In Bed supine;call light within reach;exit alarm on;encouraged to call for assist  -LA                  User Key  (r) = Recorded By, (t) = Taken By, (c) = Cosigned By      Initials Name Effective Dates    CE Lee Zamora, СВЕТЛАНА 06/16/21 -     Kannan Moseley RN 09/22/22 -     Anila Jolley OT 02/14/22 -                            OT Recommendation and Plan     Plan of Care Review  Plan of Care Reviewed With: patient  Plan of Care Reviewed With: patient        Time Calculation:     Therapy Charges for Today       Code Description Service Date Service Provider Modifiers Qty    57607415790 HC OT THERAPEUTIC ACT EA 15 MIN 5/8/2025 Anila Braun OT GO 1    32105746689 HC OT THER PROC EA 15 MIN 5/8/2025 Anila Braun OT GO 1    26442686952 HC OT THER PROC EA 15 MIN 5/9/2025 Anila Braun OT GO 1    54791026339 HC OT THERAPEUTIC ACT EA 15 MIN 5/9/2025 Anila Braun OT GO 1                 Anila Braun OT  5/9/2025

## 2025-05-09 NOTE — PROGRESS NOTES
Inpatient Progress Note  Johanna Street  Date: 05/09/25  MRN: [id number]      Subjective:  Resting in bed.  Notes pain to the right knee was bad this morning but is well-controlled after her pain medication.  No chest pain or shortness of air.  (+) flatulence.  Vital signs stable.  No acute events overnight.  Notes plan for discharge to short-term rehab.       Objective:      Vitals:    05/08/25 2040 05/09/25 0300 05/09/25 0624 05/09/25 1032   BP: 112/59 102/58 102/64 107/72   BP Location: Right arm Right arm Right arm Right arm   Patient Position: Lying Lying Lying Lying   Pulse:  112 110    Resp: 18 18 18 18   Temp: 97.8 °F (36.6 °C) 97.6 °F (36.4 °C) 98.2 °F (36.8 °C) 98.2 °F (36.8 °C)   TempSrc: Oral Oral Oral Oral   SpO2:   98%    Weight:       Height:              Physical Exam:  Constitutional:  Well-developed, well-nourished.  No acute distress.        Musculoskeletal:  Upon examination of the right lower extremity, the surgical dressing and knee immobilizer are in place. The dressing is clean and dry. No edema, erythema, or ecchymosis noted to the tissues above or below the dressing. Able to flex and extend the ankle and digits. Capillary refill is brisk. Light touch sensation is intact, distally. DP pulse present.    Labs:    Results from last 7 days   Lab Units 05/09/25  0338 05/08/25 0127 05/07/25  0118   WBC 10*3/mm3 8.23 9.08 12.19*   HEMOGLOBIN g/dL 8.9* 10.2* 11.3*   HEMATOCRIT % 28.2* 31.8* 36.2   MCV fL 94.9 94.1 96.5   MCHC g/dL 31.6 32.1 31.2*   PLATELETS 10*3/mm3 269 276 309         Results from last 7 days   Lab Units 05/09/25  0338 05/08/25  0127 05/07/25  0118 05/03/25  0107 05/02/25  1633   SODIUM mmol/L 134* 128* 134*   < > 135*   POTASSIUM mmol/L 3.6 4.0 5.2   < > 4.3   MAGNESIUM mg/dL  --   --   --   --  2.0   CHLORIDE mmol/L 99 92* 96*   < > 97*   CO2 mmol/L 24.6 24.0 25.6   < > 21.7*   BUN mg/dL 9 10 9   < > 11   CREATININE mg/dL 0.71 0.75 0.79   < > 0.96   CALCIUM mg/dL 8.2* 8.6 8.9    "< > 9.7   GLUCOSE mg/dL 116* 134* 127*   < > 160*   ALBUMIN g/dL 2.7* 2.7* 3.0*   < > 4.2   BILIRUBIN mg/dL 0.6 0.9 1.0   < > 1.1   ALK PHOS U/L 153* 152* 135*   < > 120*   AST (SGOT) U/L 27 33* 45*   < > 20   ALT (SGPT) U/L 8 13 23   < > 14    < > = values in this interval not displayed.   Estimated Creatinine Clearance: 63.1 mL/min (by C-G formula based on SCr of 0.71 mg/dL).  No results found for: \"AMMONIA\"          No results found for: \"HGBA1C\"  Lab Results   Component Value Date    TSH 4.760 (H) 05/02/2025         Pain Management Panel          Latest Ref Rng & Units 5/3/2025   Pain Management Panel   Amphetamine, Urine Qual Negative Negative    Barbiturates Screen, Urine Negative Negative    Benzodiazepine Screen, Urine Negative Positive    Buprenorphine, Screen, Urine Negative Negative    Cocaine Screen, Urine Negative Negative    Fentanyl, Urine Negative Negative    Methadone Screen , Urine Negative Negative    Methamphetamine, Ur Negative Negative        No results found for: \"BLOODCX\"  No results found for: \"URINECX\"  No results found for: \"WOUNDCX\"  No results found for: \"STOOLCX\"              Radiology:  Imaging Results (Last 72 Hours)       Procedure Component Value Units Date/Time    FL Surgery Fluoro [[id number]] Collected: 05/07/25 0759     Updated: 05/07/25 0802    Narrative:      EXAMINATION: FL SURGERY FLUORO-      CLINICAL INDICATION: tibfid; S82.141A-Displaced bicondylar fracture of  right tibia, initial encounter for closed fracture        COMPARISON: None available     Total fluoroscopy time 210.3 seconds     Fluoroscopy was provided and 8 images were acquired during surgery     For a full procedural report, please see the report provided by the  performing physician        This report was finalized on 5/7/2025 8:00 AM by Dr. Lane Boothe MD.                 Assessment:      Status post right lateral tibial plateau ORIF with open lateral meniscus repair, POD #3.       Plan:      Strict non " weight bearing to the right lower extremity x 6 - 8 weeks.     Maintain the surgical dressing and knee immobilizer.     PT/OT for mobility.     On Lovenox for DVT prophylaxis.     Pain control.    Follow up in the office with Dr. Schwartz in 2 weeks for reevaluation, wound check, and a repeat xray of the right knee.    Please call for any questions or concerns.    Neo Correa, DAVID   May 9, 2025   10:59 EDT

## 2025-05-09 NOTE — CONSULTS
"Assessment:  Diagnosis: tibial plateau fracture, right    Allergies   Allergen Reactions    Codeine Anaphylaxis    Diltiazem Anaphylaxis    Latex Itching    Zinc Hives and Swelling    Levaquin [Levofloxacin] Rash    Penicillins Rash       Order Date/Time: 5/8/2025 0914  Indications: difficult access; iv abx  LABS:  No results found for: \"INR\", \"PROTIME\"  No results found for: \"PTT\"  Lab Results   Component Value Date    WBC 8.23 05/09/2025    HGB 8.9 (L) 05/09/2025    HCT 28.2 (L) 05/09/2025    MCV 94.9 05/09/2025     05/09/2025     Lab Results   Component Value Date    BUN 9 05/09/2025     Lab Results   Component Value Date    CREATININE 0.71 05/09/2025     Lab Results   Component Value Date    EGFRIFNONA 74 05/04/2019     Labs Reviewed: all labs reviewed    Contraindications for PICC/Midline:  No contraindications noted    Recommendations:  PowerGlide Pro Midline Catheter; 18 g; 10 cm  Upper Left Basilic    Procedure Time Out:  Time out Time: 0915  Correct Patient Identity: Yes  Correct Surgical Side and Site Are Marked: Yes  Agreement on Procedure to be done: Yes  Antibiotic Given: N/A  RN: ALEXUS Mullins RN    PowerGlide Pro Midline Catheter placed with ultrasound guidance and verified by blood return. Minimal blood loss noted. Catheter flushes easily. Blood return noted. Patient nurse, Oanh SOTOMAYOR, made aware that midline is in upper L arm and ready for use.      Nikki Mullins RN    "

## 2025-05-09 NOTE — CASE MANAGEMENT/SOCIAL WORK
Discharge Planning Assessment  [facility]     Patient Name: Johanna Street  MRN: [id number]  Today's Date: 5/9/2025    Admit Date: 5/2/2025            Discharge Plan       Row Name 05/09/25 0927       Plan    Plan SS left message for [facility] per Oanh checking on status of workman's comp claim. SS left message requesting return call. SS to follow.    09:40am: SS confirmed with [facility] per Oanh that Pt has been accepted at facility if medically stable. SS called Provider, left a voicemail making him aware of bed availability. SS to follow.                   Continued Care and Services - Admitted Since 5/2/2025       Destination       Service Provider Request Status Services Address Phone Fax Patient Preferred    [facility] Accepted -- [address] [address] [phone] --                  IHSAN Mckeon

## 2025-05-09 NOTE — PLAN OF CARE
Goal Outcome Evaluation:           Progress: no change  Outcome Evaluation: pt resting in bed at this time with no complaints or concenrs. pt has been intermittently confused this shift. PT ambulated/stood at bedside this shift.  NS continued as ordered. VSS on RA. plan of care on going

## 2025-05-12 NOTE — DISCHARGE PLACEMENT REQUEST
"Essence Garcia (79 y.o. Female)       Date of Birth   1945    Social Security Number       Address   [address] [place] [address] [place] [address]    Home Phone   [phone]    MRN   [id number]       [facility]    Marital Status                               Admission Date   5/2/2025    Admission Type   Emergency    Admitting Provider   Pia Wren DO    Attending Provider       Department, Room/Bed   [address], 3335/1P       Discharge Date   5/9/2025    Discharge Disposition   Skilled Nursing Facility (DC - External)    Discharge Destination   Other                              Attending Provider: (none)   Allergies: Codeine, Diltiazem, Latex, Zinc, Levaquin [Levofloxacin], Penicillins    Isolation: None   Infection: None   Code Status: Prior    Ht: 154.9 cm (61\")   Wt: 81.2 kg (179 lb 0.2 oz)    Admission Cmt: None   Principal Problem: Tibial plateau fracture, right [S82.141A]                   Active Insurance as of 5/2/2025       Primary Coverage       Payor Plan Insurance Group Employer/Plan Group    ANTHEM MEDICARE REPLACEMENT ANTHEM MEDICARE ADVANTAGE PPO [id number]       Payor Plan Address Payor Plan Phone Number Payor Plan Fax Number Effective Dates    [address] [phone]  5/1/2024 - None Entered    [facility] [id number]         Subscriber Name Subscriber Birth Date Member ID       ESSENCE GARCIA 1945 [id number]                     Emergency Contacts        (Rel.) Home Phone Work Phone Mobile Phone    Rosanne Hargrove (Grandchild) [phone] -- --    MERE OMALLEY (Sister) [phone] -- --    Chapito Heard (Brother) -- -- [phone]                 Discharge Summary        Maurilio Patel DO at 05/09/25 0953              [facility] DISCHARGE SUMMARY      Date of Admission: 5/2/2025    Date of Discharge: 5/9/2025     PCP: Bi Youssef MD    Admission Diagnosis:   Please see admission H&P    Discharge " Diagnosis:   Right lateral tibial plateau fracture   Chronic Afib  Essential HTN  Acute normocytic anemia   Mild hyponatremia  GERD    Procedures Performed:  5/6/25: Right tibial plateau ORIF and open lateral meniscus repair   5/9/25:Midline catheter placement left basilic vein      Consults:   Consults       Date and Time Order Name Status Description    5/2/2025 11:35 PM Inpatient Orthopedic Surgery Consult Completed     5/2/2025 10:31 PM Inpatient Hospitalist Consult                History of Present Illness:  Johanna Street is a 79 y.o. female who presented to [facility] ED with CC of right leg pain. Please see admission H&P for complete details.     In the ED, x-rays of the right shoulder, fever and ankle did not reveal any acute findings. X-ray of the right tib-fib did reveal tibial plateau fracture with possible fibular shaft fracture. CT of the RLE without contrast revealed a lateral tibial plateau fracture with depression. Her care was discussed with orthopedic surgeon on call who recommended a knee immobilizer with close outpatient follow up. However, concern was noted that she may be unable to care for herself at home with increased risk for fall and decision was made for admission for PT/OT and possible placement.      Hospital Course  Johanna Street was admitted to the med/surg floor for further evaluation and treatment. Her home medications were restarted with exception of Eliquis pending formal surgical eval. PRN analgesics were made available. PT/OT and orthopedic surgery were consulted.     Upon initial evaluation, orthopedic surgery recommended outpatient follow-up. However, as she lives alone with concern that she would not be able to manage independently the decision was later made to proceed with surgical intervention as an inpatient. Eliquis was held and she underwent right tibial plateau ORIF and open lateral meniscus repair on 5/6/25. She tolerated the procedure well and remained clinically stable  postoperatively. She was placed on DVT PPX with SQ Lovenox BID per ortho recs with plans to resume Eliquis when okay with orthopedic surgery. Routine labs revealed worsening anemia with probable component of acute blood loss but she did not require any transfusions. Mild hyponatremia was noted but improved with gentle IVF's and holding her home Lasix with PO intake encouraged. She was monitored on telemetry with a few brief episodes of tachycardia but remained overall stable. She participated with PT/OT and she was deemed medically stable for discharge on 5/9 once SNF placement was secured. Instructions were given for outpatient follow up with PCP and orthopedic surgery.     Condition on Discharge:  Stable    Vital Signs  Vitals:    05/09/25 0624   BP: 102/64   Pulse: 110   Resp: 18   Temp: 98.2 °F (36.8 °C)   SpO2: 98%       Physical Exam:  General:    Awake, alert, in no acute distress   Heart:      Normal S1 and S2. Irregularly irregular.    Lungs:     Respirations regular, even and unlabored. Lungs clear to auscultation B/L. No wheezes, rales or rhonchi.   Abdomen:   Soft and nontender. No guarding, rebound tenderness or  organomegaly noted. Bowel sounds present x 4.   Extremities:  No clubbing, cyanosis or LLE edema noted. (+) knee immobilizer and surgical dressing RLE.      Discharge Disposition:   SNF      Discharge Medications:     Discharge Medications        New Medications        Instructions Start Date   enoxaparin sodium 30 MG/0.3ML solution prefilled syringe syringe  Commonly known as: LOVENOX   30 mg, Subcutaneous, Every 12 Hours      methocarbamol 500 MG tablet  Commonly known as: ROBAXIN   500 mg, Oral, Every 8 Hours PRN      oxyCODONE 5 MG immediate release tablet  Commonly known as: ROXICODONE   5 mg, Oral, Every 8 Hours PRN      polyethylene glycol 17 g packet  Commonly known as: MIRALAX   17 g, Oral, Daily      sennosides-docusate 8.6-50 MG per tablet  Commonly known as: PERICOLACE   2 tablets,  Oral, 2 Times Daily             Changes to Medications        Instructions Start Date   diazePAM 5 MG tablet  Commonly known as: VALIUM  What changed: when to take this   5 mg, Oral, Nightly             Continue These Medications        Instructions Start Date   divalproex 125 MG DR tablet  Commonly known as: DEPAKOTE   125 mg, Oral, Daily PRN      lansoprazole 15 MG capsule  Commonly known as: PREVACID   15 mg, Daily      sotalol 80 MG tablet  Commonly known as: BETAPACE   80 mg, 2 Times Daily             Stop These Medications      apixaban 5 MG tablet tablet  Commonly known as: Eliquis     furosemide 20 MG tablet  Commonly known as: LASIX                Discharge Diet:   Dietary Orders (From admission, onward)       Start     Ordered    05/08/25 0753  Diet: Regular/House; Fluid Consistency: Thin (IDDSI 0)  Diet Effective Now        References:    Diet Order Definitions   Question Answer Comment   Diets: Regular/House    Fluid Consistency: Thin (IDDSI 0)        05/08/25 0752                    Activity at Discharge:  NWB RLE    Follow-up Appointments:  Additional Instructions for the Follow-ups that You Need to Schedule       Discharge Follow-up with PCP   As directed       Currently Documented PCP:    Bi Youssef MD    PCP Phone Number:    [phone]     Follow Up Details: Follow up with PCP at SNF within 1 week.        Discharge Follow-up with Specified Provider: Follow up with orthopedic surgery in 2 weeks.   As directed      To: Follow up with orthopedic surgery in 2 weeks.               Contact information for follow-up providers       Bon Harrell, DO Follow up in 2 week(s).    Specialty: Orthopedic Surgery  Contact information:  [address] [place] [address] [phone]               Bi Youssef MD .    Specialty: Family Medicine  Why: Follow up with PCP at SNF within 1 week.  Contact information:  [address] [place] [address] [phone]                        Contact information for after-discharge care       Destination       [facility] .    Service: Skilled Nursing  Contact information:  [address] [id number] [phone]                                       Test Results Pending at Discharge:       The ASCVD Risk score (Dianna ANDRADE, et al., 2019) failed to calculate for the following reasons:    Cannot find a previous HDL lab    Cannot find a previous total cholesterol lab      Carri Galloway DO  05/09/25  09:53 EDT      Time: Greater than 30 minutes spent on this discharge.           Electronically signed by Carri Galloway DO at 05/09/25 1511       Discharge Order (From admission, onward)       Start     Ordered    05/09/25 0950  Discharge patient  Once        Expected Discharge Date: 05/09/25   Discharge Disposition: Skilled Nursing Facility (DC - External)   Physician of Record for Attribution - Please select from Treatment Team: CARRI GALLOWAY [[id number]]   Review needed by CMO to determine Physician of Record: No      Question Answer Comment   Physician of Record for Attribution - Please select from Treatment Team CARRI GALLOWAY    Review needed by CMO to determine Physician of Record No        05/09/25 0953

## (undated) DEVICE — DRP C/ARM W/BAND W/CLIPS 41X74IN

## (undated) DEVICE — TP SXN YANKR FLANG STRL

## (undated) DEVICE — CONN Y IRR DISP 1P/U

## (undated) DEVICE — GOWN,REINF,POLY,ECL,PP SLV,XL: Brand: MEDLINE

## (undated) DEVICE — DRSNG WND GZ CURAD OIL EMULSION 3X8IN LF STRL 1PK

## (undated) DEVICE — Device: Brand: ENDOGATOR

## (undated) DEVICE — HOLDER: Brand: DEROYAL

## (undated) DEVICE — SUT MNCRYL PLS ANTIB UD 2/0 CP1 27IN

## (undated) DEVICE — SCRW CORT S/TAP 3.5X38MM
Type: IMPLANTABLE DEVICE | Site: TIBIA | Status: NON-FUNCTIONAL
Removed: 2025-05-06

## (undated) DEVICE — SINGLE PORT MANIFOLD: Brand: NEPTUNE 2

## (undated) DEVICE — Device

## (undated) DEVICE — ANTIBACTERIAL UNDYED BRAIDED (POLYGLACTIN 910), SYNTHETIC ABSORBABLE SUTURE: Brand: COATED VICRYL

## (undated) DEVICE — IMPLANTABLE DEVICE: Type: IMPLANTABLE DEVICE | Site: TIBIA | Status: NON-FUNCTIONAL

## (undated) DEVICE — BNDG ELAS CO-FLEX SLF ADHR 4IN5YD LF STRL

## (undated) DEVICE — PK EXTREM LOWR 70

## (undated) DEVICE — Device: Brand: DEFENDO AIR/WATER/SUCTION AND BIOPSY VALVE

## (undated) DEVICE — DRSNG PAD ABD 8X10IN STRL

## (undated) DEVICE — FRCP BX RADJAW4 NDL 2.8 240CM LG OG BX40

## (undated) DEVICE — BIT DRL PERC QC CALIB 2.8X200MM

## (undated) DEVICE — TUBING, SUCTION, 1/4" X 20', STRAIGHT: Brand: MEDLINE INDUSTRIES, INC.

## (undated) DEVICE — SYR LUERLOK 30CC

## (undated) DEVICE — SCRW LK VA/LCP S/TAP STRDRV 3.5X70MM
Type: IMPLANTABLE DEVICE | Site: TIBIA | Status: NON-FUNCTIONAL
Removed: 2025-05-06

## (undated) DEVICE — DRP C/ARMOR

## (undated) DEVICE — GLV SURG SENSICARE PI ORTHO SZ7.5 LF STRL

## (undated) DEVICE — BIT DRL QC DIA 2.5X180MM

## (undated) DEVICE — UNDERCAST PADDING: Brand: DEROYAL

## (undated) DEVICE — THE BITE BLOCK MAXI, LATEX FREE STRAP IS USED TO PROTECT THE ENDOSCOPE INSERTION TUBE FROM BEING BITTEN BY THE PATIENT.

## (undated) DEVICE — WRP COMPR KN COLD UNIV

## (undated) DEVICE — PROXIMATE RH ROTATING HEAD SKIN STAPLERS (35 WIDE) CONTAINS 35 STAINLESS STEEL STAPLES: Brand: PROXIMATE

## (undated) DEVICE — BNDG,ELSTC,MATRIX,STRL,6"X5YD,LF,HOOK&LP: Brand: MEDLINE

## (undated) DEVICE — CUFF TOURNI 1BLADDER 1PRT 24IN STRL